# Patient Record
Sex: FEMALE | Race: BLACK OR AFRICAN AMERICAN | Employment: FULL TIME | URBAN - METROPOLITAN AREA
[De-identification: names, ages, dates, MRNs, and addresses within clinical notes are randomized per-mention and may not be internally consistent; named-entity substitution may affect disease eponyms.]

---

## 2018-08-24 ENCOUNTER — OFFICE VISIT (OUTPATIENT)
Dept: BARIATRICS/WEIGHT MGMT | Age: 38
End: 2018-08-24
Payer: MEDICARE

## 2018-08-24 VITALS
DIASTOLIC BLOOD PRESSURE: 68 MMHG | HEART RATE: 72 BPM | SYSTOLIC BLOOD PRESSURE: 108 MMHG | RESPIRATION RATE: 20 BRPM | HEIGHT: 62 IN | WEIGHT: 246 LBS | BODY MASS INDEX: 45.27 KG/M2

## 2018-08-24 DIAGNOSIS — K21.9 GASTROESOPHAGEAL REFLUX DISEASE WITHOUT ESOPHAGITIS: Primary | ICD-10-CM

## 2018-08-24 DIAGNOSIS — E66.01 MORBID OBESITY (HCC): ICD-10-CM

## 2018-08-24 DIAGNOSIS — R06.83 SNORING: ICD-10-CM

## 2018-08-24 PROCEDURE — 99204 OFFICE O/P NEW MOD 45 MIN: CPT | Performed by: SURGERY

## 2018-08-24 RX ORDER — TOPIRAMATE 100 MG/1
100 TABLET, FILM COATED ORAL 2 TIMES DAILY
COMMUNITY
End: 2020-02-16

## 2018-08-26 NOTE — PROGRESS NOTES
List:  Current Outpatient Prescriptions   Medication Sig Dispense Refill    topiramate (TOPAMAX) 100 MG tablet Take 100 mg by mouth 2 times daily       No current facility-administered medications for this visit. SOCIAL:      This patient is alone for the evaluation today. [] HIV Risk Factors (i.e.) intravenous drug abuser; at risk sexual behavior; received blood products    [] TB Risk Factors (i.e.) Medically underserved, institutional care, foreign born, endemic area; exposure to active case    [] Hepatitis B&C Risk Factors (i.e.) Received blood transfusion prior to 1992; recreational drug use; high risk sexual behaviors; tattoos or body piercings; contact with blood or needle sticks in the workplace    Comprehension    Ability to grasp concepts and respond to questions:   [x] High   [] Medium   [] Low    Motivation    [x] Asks Questions; eager to learn   [] Needs education   [] Extreme anxiety    [] uncooperative   [] Denies need for education    English Speaking Ability    [x] Speaks English well   [x] Reads English well   [x] Understands spoken Veleria Ronald    [] Understands written English   [] No need for interpretive support      [] Might benefit from interpretive support   []  required for all services     REVIEW OF SYSTEMS:     Do you feel sleepy during the day? [x] Yes     [] No  Do you get short of breath when walking up two flights of stairs? [x] Yes     [] No  Do you get chest pains when walking up two flights of stairs? [x] Yes     [] No  Do you suffer from back pain? [x] Yes     [] No  Do you suffer from knee pain? [x] Yes     [] No    Do you or have you had any of the following?   Cardiovascular YES NO Respiratory YES NO   High Blood Pressure   []   [x] COPD   []   [x]   Heart Attack   []   [x] TB/Positive skin Test   []   [x]   Congestive Heart Failure   []   [x] Obstructive Sleep Apnea   []   [x]   Coronary Artery Disease   []   [x] Asthma   []   [x] oriented. Moving all four extremities equally, sensation grossly intact bilateral.  Skin: Skin is warm, dry and intact. Psychiatric: The patient has a normal mood and affect. Speech is normal and behavior is normal. Judgment and thought content normal. Cognition and memory are normal.     RECOMMENDATIONS:     We spent a great deal of time discussing the risks and benefits of Jyoti-en-Y Gastric Bypass and Sleeve Gastrectomy, including but not limited to injury to intra-abdominal organs, breakdown of the gastric staple line, the need for re-operative therapy,  prolonged hospitalization,  mechanical ventilation,  and death. We discussed the possibility of bleeding, the need for blood transfusions, blood clots, hospital-acquired and intra-abdominal infection, anastomotic stricture, and worsening GERD. And we discussed the need for post-operative visit compliance, behavior modifications and diet changes, protein and vitamin supplementation, as well as routine scheduled and dedicated exercise. I instructed the patient to utilize the exercise log that will be given to them at their fist dietician appointment. We discussed the potential weight loss benefit of approximately 50-60/60-70% of her excess body weight at 12-18 months post-op, as well as the possibility of insufficient weight loss or weight gain after 2 years post-operative time. PLAN:       Diagnosis Orders   1. Gastroesophageal reflux disease without esophagitis  CBC Auto Differential    Comprehensive Metabolic Panel    Ferritin    Hemoglobin A1C    Iron and TIBC    Lipid Panel    Magnesium    PTH, Intact    Zinc    Vitamin D 25 Hydroxy    Vitamin B12 & Folate    Vitamin B1    Vitamin A    TSH without Reflex    T4, Free    Nicotine, Blood    Urine Drug Screen   2. Snoring  Apnea Link Screening (THIS IS NOT A HOME SLEEP STUDY) - [facility] Only   3.  Morbid obesity (HCC)  CBC Auto Differential    Comprehensive Metabolic Panel    Ferritin    Hemoglobin A1C Iron and TIBC    Lipid Panel    Magnesium    PTH, Intact    Zinc    Vitamin D 25 Hydroxy    Vitamin B12 & Folate    Vitamin B1    Vitamin A    TSH without Reflex    T4, Free    Nicotine, Blood    Urine Drug Screen    Apnea Link Screening (THIS IS NOT A HOME SLEEP STUDY) - [facility] Only          Initial Testing     Primary Procedure: Jyoti-en-Y Gastric Bypass and Sleeve Gastrectomy     Labwork: Initial Pre-surgical Lab Tests (CMP, TSH, Fasting Lipid Profile, Mg, Zinc, Vit B1 (whole blood), Vit B12, 25-OH Vit D, Fe,  Ferritin,  Folate), Urine drug and alcohol screen  and Negative serum nicotine prior to submission for pre-auth    Endoscopic Studies: Upper GI Endoscopy for GERD which has been untreated. Psychological Assessment: Psychological Evaluation and Clearance    Nutrition Assessment: Bariatric Nutrition Assessment and Clearance    Pulmonary Evaluation: Obstructive Sleep Apnea Evaluation    Other  Consultations: PCP clearance    Physician Supervised Diet and Exercise required by the patients insurance company: 3 months.       Surgical Diet requirement:  2 weeks    Final Testing  Screening Chest Xray  and EKG within 6 months of date of surgery    Labwork:  Final Lab Tests  within 3 months of date of surgery (CBC, PT/PTT, BMP)     Electronically signed by Liseth Lopez DO on 8/26/2018 at 5:08 PM

## 2018-08-30 LAB
ALBUMIN SERPL-MCNC: 4.2 G/DL
ALP BLD-CCNC: 69 U/L
ALT SERPL-CCNC: 26 U/L
ANION GAP SERPL CALCULATED.3IONS-SCNC: NORMAL MMOL/L
AST SERPL-CCNC: 21 U/L
AVERAGE GLUCOSE: 111
BASOPHILS ABSOLUTE: 0 /ΜL
BASOPHILS RELATIVE PERCENT: 0 %
BILIRUB SERPL-MCNC: 0.3 MG/DL (ref 0.1–1.4)
BUN BLDV-MCNC: 8 MG/DL
CALCIUM SERPL-MCNC: 9.3 MG/DL
CHLORIDE BLD-SCNC: 105 MMOL/L
CHOLESTEROL, TOTAL: 138 MG/DL
CHOLESTEROL/HDL RATIO: 3.7
CO2: 26 MMOL/L
CREAT SERPL-MCNC: 0.9 MG/DL
EOSINOPHILS ABSOLUTE: 0.16 /ΜL
EOSINOPHILS RELATIVE PERCENT: 4 %
FERRITIN: 8.2 NG/ML (ref 9–150)
FOLATE: 8.8
GFR CALCULATED: NORMAL
GLUCOSE BLD-MCNC: 106 MG/DL
HBA1C MFR BLD: 5.5 %
HCT VFR BLD CALC: 36.3 % (ref 36–46)
HDLC SERPL-MCNC: 37 MG/DL (ref 35–70)
HEMOGLOBIN: 11.3 G/DL (ref 12–16)
IRON: 40
LDL CHOLESTEROL CALCULATED: 83 MG/DL (ref 0–160)
LYMPHOCYTES ABSOLUTE: 1.92 /ΜL
LYMPHOCYTES RELATIVE PERCENT: 49 %
MAGNESIUM: 2.1 MG/DL
MCH RBC QN AUTO: 25.2 PG
MCHC RBC AUTO-ENTMCNC: 31.2 G/DL
MCV RBC AUTO: 80.6 FL
MONOCYTES ABSOLUTE: 0.2 /ΜL
MONOCYTES RELATIVE PERCENT: 5 %
NEUTROPHILS ABSOLUTE: 1.64 /ΜL
NEUTROPHILS RELATIVE PERCENT: ABNORMAL %
PDW BLD-RTO: ABNORMAL %
PLATELET # BLD: 313 K/ΜL
PMV BLD AUTO: ABNORMAL FL
POTASSIUM SERPL-SCNC: 4.3 MMOL/L
PTH INTACT: 143.3
RBC # BLD: 4.51 10^6/ΜL
SODIUM BLD-SCNC: 141 MMOL/L
T4 FREE: 1.36
TOTAL IRON BINDING CAPACITY: 407
TOTAL PROTEIN: 7.3
TRIGL SERPL-MCNC: 89 MG/DL
TSH SERPL DL<=0.05 MIU/L-ACNC: 1.52 UIU/ML
VITAMIN B-12: 413
VITAMIN D 25-HYDROXY: 12.1
VITAMIN D2, 25 HYDROXY: NORMAL
VITAMIN D3,25 HYDROXY: NORMAL
VLDLC SERPL CALC-MCNC: 18 MG/DL
WBC # BLD: 3.91 10^3/ML

## 2018-09-05 DIAGNOSIS — K21.9 GASTROESOPHAGEAL REFLUX DISEASE WITHOUT ESOPHAGITIS: ICD-10-CM

## 2018-09-05 DIAGNOSIS — E66.01 MORBID OBESITY (HCC): ICD-10-CM

## 2018-09-10 DIAGNOSIS — K21.9 GASTROESOPHAGEAL REFLUX DISEASE WITHOUT ESOPHAGITIS: ICD-10-CM

## 2018-09-10 DIAGNOSIS — E66.01 MORBID OBESITY (HCC): ICD-10-CM

## 2018-09-12 RX ORDER — ERGOCALCIFEROL 1.25 MG/1
50000 CAPSULE ORAL WEEKLY
Qty: 8 CAPSULE | Refills: 0 | Status: SHIPPED | OUTPATIENT
Start: 2018-09-12 | End: 2018-11-04 | Stop reason: SDUPTHER

## 2018-10-29 DIAGNOSIS — R06.83 SNORING: ICD-10-CM

## 2018-10-29 DIAGNOSIS — E66.01 MORBID OBESITY (HCC): ICD-10-CM

## 2018-11-02 ENCOUNTER — HOSPITAL ENCOUNTER (OUTPATIENT)
Age: 38
Setting detail: OUTPATIENT SURGERY
Discharge: HOME | End: 2018-11-02
Attending: SURGERY | Admitting: SURGERY
Payer: MEDICARE

## 2018-11-02 ENCOUNTER — ANESTHESIA EVENT (OUTPATIENT)
Dept: ENDOSCOPY | Age: 38
End: 2018-11-02
Payer: MEDICARE

## 2018-11-02 ENCOUNTER — ANESTHESIA (OUTPATIENT)
Dept: ENDOSCOPY | Age: 38
End: 2018-11-02
Payer: MEDICARE

## 2018-11-02 VITALS
TEMPERATURE: 97.7 F | BODY MASS INDEX: 47.2 KG/M2 | HEART RATE: 74 BPM | OXYGEN SATURATION: 99 % | DIASTOLIC BLOOD PRESSURE: 103 MMHG | HEIGHT: 61 IN | RESPIRATION RATE: 19 BRPM | WEIGHT: 250 LBS | SYSTOLIC BLOOD PRESSURE: 142 MMHG

## 2018-11-02 VITALS
OXYGEN SATURATION: 96 % | DIASTOLIC BLOOD PRESSURE: 80 MMHG | SYSTOLIC BLOOD PRESSURE: 115 MMHG | RESPIRATION RATE: 36 BRPM

## 2018-11-02 LAB — HCG, PREGNANCY URINE (POC): NEGATIVE

## 2018-11-02 PROCEDURE — 7100000011 HC PHASE II RECOVERY - ADDTL 15 MIN: Performed by: SURGERY

## 2018-11-02 PROCEDURE — 2709999900 HC NON-CHARGEABLE SUPPLY: Performed by: SURGERY

## 2018-11-02 PROCEDURE — 43239 EGD BIOPSY SINGLE/MULTIPLE: CPT | Performed by: SURGERY

## 2018-11-02 PROCEDURE — 2580000003 HC RX 258: Performed by: NURSE ANESTHETIST, CERTIFIED REGISTERED

## 2018-11-02 PROCEDURE — 2500000003 HC RX 250 WO HCPCS: Performed by: NURSE ANESTHETIST, CERTIFIED REGISTERED

## 2018-11-02 PROCEDURE — 84703 CHORIONIC GONADOTROPIN ASSAY: CPT

## 2018-11-02 PROCEDURE — 6360000002 HC RX W HCPCS: Performed by: NURSE ANESTHETIST, CERTIFIED REGISTERED

## 2018-11-02 PROCEDURE — 3609012400 HC EGD TRANSORAL BIOPSY SINGLE/MULTIPLE: Performed by: SURGERY

## 2018-11-02 PROCEDURE — 2580000003 HC RX 258: Performed by: SURGERY

## 2018-11-02 PROCEDURE — 88305 TISSUE EXAM BY PATHOLOGIST: CPT

## 2018-11-02 PROCEDURE — 3700000000 HC ANESTHESIA ATTENDED CARE: Performed by: SURGERY

## 2018-11-02 PROCEDURE — 7100000010 HC PHASE II RECOVERY - FIRST 15 MIN: Performed by: SURGERY

## 2018-11-02 RX ORDER — LIDOCAINE HYDROCHLORIDE 10 MG/ML
INJECTION, SOLUTION INFILTRATION; PERINEURAL PRN
Status: DISCONTINUED | OUTPATIENT
Start: 2018-11-02 | End: 2018-11-02 | Stop reason: SDUPTHER

## 2018-11-02 RX ORDER — RANITIDINE 150 MG/1
150 TABLET ORAL 2 TIMES DAILY
Status: ON HOLD | COMMUNITY
End: 2018-11-02 | Stop reason: HOSPADM

## 2018-11-02 RX ORDER — PANTOPRAZOLE SODIUM 40 MG/1
40 GRANULE, DELAYED RELEASE ORAL
Qty: 30 EACH | Refills: 3 | Status: SHIPPED | OUTPATIENT
Start: 2018-11-02 | End: 2020-02-16

## 2018-11-02 RX ORDER — SODIUM CHLORIDE 9 MG/ML
INJECTION, SOLUTION INTRAVENOUS CONTINUOUS PRN
Status: DISCONTINUED | OUTPATIENT
Start: 2018-11-02 | End: 2018-11-02 | Stop reason: SDUPTHER

## 2018-11-02 RX ORDER — PANTOPRAZOLE SODIUM 40 MG/1
40 GRANULE, DELAYED RELEASE ORAL
Status: ON HOLD | COMMUNITY
End: 2018-11-02

## 2018-11-02 RX ORDER — OXYCODONE HYDROCHLORIDE AND ACETAMINOPHEN 5; 325 MG/1; MG/1
1 TABLET ORAL EVERY 4 HOURS PRN
Status: ON HOLD | COMMUNITY
End: 2020-04-23 | Stop reason: HOSPADM

## 2018-11-02 RX ORDER — PANTOPRAZOLE SODIUM 40 MG/1
40 GRANULE, DELAYED RELEASE ORAL
Qty: 30 EACH | Refills: 3 | Status: SHIPPED | OUTPATIENT
Start: 2018-11-02 | End: 2018-11-02

## 2018-11-02 RX ORDER — DIAZEPAM 5 MG/1
5 TABLET ORAL EVERY 6 HOURS PRN
COMMUNITY
End: 2020-02-16

## 2018-11-02 RX ORDER — GLYCOPYRROLATE 1 MG/5 ML
SYRINGE (ML) INTRAVENOUS PRN
Status: DISCONTINUED | OUTPATIENT
Start: 2018-11-02 | End: 2018-11-02 | Stop reason: SDUPTHER

## 2018-11-02 RX ORDER — SODIUM CHLORIDE 9 MG/ML
INJECTION, SOLUTION INTRAVENOUS CONTINUOUS
Status: DISCONTINUED | OUTPATIENT
Start: 2018-11-02 | End: 2018-11-02 | Stop reason: HOSPADM

## 2018-11-02 RX ORDER — PROPOFOL 10 MG/ML
INJECTION, EMULSION INTRAVENOUS PRN
Status: DISCONTINUED | OUTPATIENT
Start: 2018-11-02 | End: 2018-11-02 | Stop reason: SDUPTHER

## 2018-11-02 RX ADMIN — PROPOFOL 50 MG: 10 INJECTION, EMULSION INTRAVENOUS at 08:25

## 2018-11-02 RX ADMIN — LIDOCAINE HYDROCHLORIDE 50 MG: 10 INJECTION, SOLUTION INFILTRATION; PERINEURAL at 08:23

## 2018-11-02 RX ADMIN — Medication 0.2 MG: at 08:23

## 2018-11-02 RX ADMIN — PROPOFOL 20 MG: 10 INJECTION, EMULSION INTRAVENOUS at 08:26

## 2018-11-02 RX ADMIN — PROPOFOL 50 MG: 10 INJECTION, EMULSION INTRAVENOUS at 08:32

## 2018-11-02 RX ADMIN — PROPOFOL 20 MG: 10 INJECTION, EMULSION INTRAVENOUS at 08:33

## 2018-11-02 RX ADMIN — SODIUM CHLORIDE: 9 INJECTION, SOLUTION INTRAVENOUS at 08:12

## 2018-11-02 RX ADMIN — PROPOFOL 60 MG: 10 INJECTION, EMULSION INTRAVENOUS at 08:27

## 2018-11-02 RX ADMIN — PROPOFOL 50 MG: 10 INJECTION, EMULSION INTRAVENOUS at 08:30

## 2018-11-02 RX ADMIN — PROPOFOL 50 MG: 10 INJECTION, EMULSION INTRAVENOUS at 08:29

## 2018-11-02 RX ADMIN — PROPOFOL 100 MG: 10 INJECTION, EMULSION INTRAVENOUS at 08:23

## 2018-11-02 RX ADMIN — SODIUM CHLORIDE: 9 INJECTION, SOLUTION INTRAVENOUS at 08:03

## 2018-11-02 RX ADMIN — PROPOFOL 50 MG: 10 INJECTION, EMULSION INTRAVENOUS at 08:31

## 2018-11-02 RX ADMIN — PROPOFOL 50 MG: 10 INJECTION, EMULSION INTRAVENOUS at 08:28

## 2018-11-02 ASSESSMENT — PAIN - FUNCTIONAL ASSESSMENT: PAIN_FUNCTIONAL_ASSESSMENT: 0-10

## 2018-11-02 ASSESSMENT — PAIN SCALES - GENERAL: PAINLEVEL_OUTOF10: 0

## 2018-11-02 NOTE — ANESTHESIA PRE PROCEDURE
Department of Anesthesiology  Preprocedure Note       Name:  Luis Bills   Age:  41 y.o.  :  1980                                          MRN:  [id number]         Date:  2018      Surgeon: Arpita Mesa):  Meliza Ulrich DO    Procedure: EGD ESOPHAGOGASTRODUODENOSCOPY (N/A )    Medications prior to admission:   Prior to Admission medications    Medication Sig Start Date End Date Taking? Authorizing Provider   vitamin D (ERGOCALCIFEROL) 73574 units CAPS capsule Take 1 capsule by mouth once a week for 8 doses 18  Meliza Ulrich DO   topiramate (TOPAMAX) 100 MG tablet Take 100 mg by mouth 2 times daily    Historical Provider, MD       Current medications:    No current facility-administered medications for this encounter. Allergies: Allergies   Allergen Reactions    Bactrim [Sulfamethoxazole-Trimethoprim]     Codeine     Ibuprofen     Sulfa Antibiotics     Tylenol [Acetaminophen]        Problem List:  There is no problem list on file for this patient. Past Medical History:        Diagnosis Date    Arthritis     Bursitis     Fibromyalgia     GERD (gastroesophageal reflux disease)     Migraine     Osteoarthritis     Renal failure     Stroke (cerebrum) (HCC)     Tendonitis        Past Surgical History:        Procedure Laterality Date    CYST REMOVAL       [facility]     TUBAL LIGATION          WISDOM TOOTH EXTRACTION         Social History:    Social History   Substance Use Topics    Smoking status: Current Every Day Smoker    Smokeless tobacco: Never Used    Alcohol use Yes                                Ready to quit: Not Answered  Counseling given: Not Answered      Vital Signs (Current): There were no vitals filed for this visit.                                            BP Readings from Last 3 Encounters:   18 108/68       NPO Status:                                                                                 BMI:   Wt

## 2018-11-05 LAB — SURGICAL PATHOLOGY REPORT: NORMAL

## 2018-11-05 RX ORDER — ERGOCALCIFEROL 1.25 MG/1
CAPSULE ORAL
Qty: 8 CAPSULE | Refills: 0 | Status: SHIPPED | OUTPATIENT
Start: 2018-11-05 | End: 2020-02-16

## 2018-11-08 ENCOUNTER — TELEPHONE (OUTPATIENT)
Dept: BARIATRICS/WEIGHT MGMT | Age: 38
End: 2018-11-08

## 2019-01-02 RX ORDER — ERGOCALCIFEROL 1.25 MG/1
CAPSULE ORAL
Qty: 8 CAPSULE | Refills: 0 | OUTPATIENT
Start: 2019-01-02

## 2019-02-06 NOTE — TELEPHONE ENCOUNTER
Multiple attempts to connect with patient without response will cancel orders and notify referring provider.

## 2019-05-07 ENCOUNTER — TELEPHONE (OUTPATIENT)
Dept: BARIATRICS/WEIGHT MGMT | Age: 39
End: 2019-05-07

## 2019-05-07 NOTE — TELEPHONE ENCOUNTER
Spoke with patient. Patient would like to restart program last seen 8/24/18. Patient states she has new insurance and needs to be verified. [facility]  Member ID: [id number]  Group #: [id number]    Please advice.

## 2019-06-06 ENCOUNTER — HOSPITAL ENCOUNTER (OUTPATIENT)
Age: 39
Discharge: HOME | End: 2019-06-06

## 2019-06-06 LAB — RUBV IGG SER QL: >500 IU/ML

## 2019-06-06 PROCEDURE — 86787 VARICELLA-ZOSTER ANTIBODY: CPT

## 2019-06-06 PROCEDURE — 86762 RUBELLA ANTIBODY: CPT

## 2019-06-06 PROCEDURE — 86481 TB AG RESPONSE T-CELL SUSP: CPT

## 2019-06-06 PROCEDURE — 86735 MUMPS ANTIBODY: CPT

## 2019-06-06 PROCEDURE — 36415 COLL VENOUS BLD VENIPUNCTURE: CPT

## 2019-06-06 PROCEDURE — 86765 RUBEOLA ANTIBODY: CPT

## 2019-06-07 LAB
MEASLES IMMUNE (IGG): 3.57
MUV IGG SER QL: 3.59
VZV IGG SER QL IA: 4.45

## 2019-06-09 LAB — T-SPOT TB TEST: NORMAL

## 2020-02-16 ENCOUNTER — APPOINTMENT (OUTPATIENT)
Dept: CT IMAGING | Age: 40
End: 2020-02-16
Payer: COMMERCIAL

## 2020-02-16 ENCOUNTER — HOSPITAL ENCOUNTER (EMERGENCY)
Age: 40
Discharge: HOME | End: 2020-02-16
Attending: EMERGENCY MEDICINE
Payer: COMMERCIAL

## 2020-02-16 VITALS
RESPIRATION RATE: 19 BRPM | OXYGEN SATURATION: 99 % | TEMPERATURE: 98.2 F | HEIGHT: 61 IN | DIASTOLIC BLOOD PRESSURE: 73 MMHG | BODY MASS INDEX: 47.2 KG/M2 | SYSTOLIC BLOOD PRESSURE: 117 MMHG | HEART RATE: 89 BPM | WEIGHT: 250 LBS

## 2020-02-16 LAB
ABSOLUTE EOS #: 0.23 K/UL (ref 0–0.4)
ABSOLUTE IMMATURE GRANULOCYTE: ABNORMAL K/UL (ref 0–0.3)
ABSOLUTE LYMPH #: 2.25 K/UL (ref 1–4.8)
ABSOLUTE MONO #: 0.6 K/UL (ref 0.1–1.3)
ALBUMIN SERPL-MCNC: 3.9 G/DL (ref 3.5–5.2)
ALBUMIN/GLOBULIN RATIO: ABNORMAL (ref 1–2.5)
ALP BLD-CCNC: 69 U/L (ref 35–104)
ALT SERPL-CCNC: 9 U/L (ref 5–33)
ANION GAP SERPL CALCULATED.3IONS-SCNC: 10 MMOL/L (ref 9–17)
AST SERPL-CCNC: 12 U/L
BASOPHILS # BLD: 1 % (ref 0–2)
BASOPHILS ABSOLUTE: 0.08 K/UL (ref 0–0.2)
BILIRUB SERPL-MCNC: <0.15 MG/DL (ref 0.3–1.2)
BILIRUBIN URINE: NEGATIVE
BUN BLDV-MCNC: 16 MG/DL (ref 6–20)
BUN/CREAT BLD: ABNORMAL (ref 9–20)
CALCIUM SERPL-MCNC: 9.2 MG/DL (ref 8.6–10.4)
CHLORIDE BLD-SCNC: 101 MMOL/L (ref 98–107)
CO2: 26 MMOL/L (ref 20–31)
COLOR: YELLOW
COMMENT UA: NORMAL
CREAT SERPL-MCNC: 1.13 MG/DL (ref 0.5–0.9)
DIFFERENTIAL TYPE: ABNORMAL
EOSINOPHILS RELATIVE PERCENT: 3 % (ref 0–4)
GFR AFRICAN AMERICAN: >60 ML/MIN
GFR NON-AFRICAN AMERICAN: 54 ML/MIN
GFR SERPL CREATININE-BSD FRML MDRD: ABNORMAL ML/MIN/{1.73_M2}
GFR SERPL CREATININE-BSD FRML MDRD: ABNORMAL ML/MIN/{1.73_M2}
GLUCOSE BLD-MCNC: 120 MG/DL (ref 70–99)
GLUCOSE URINE: NEGATIVE
HCG QUALITATIVE: NEGATIVE
HCT VFR BLD CALC: 33.7 % (ref 36–46)
HEMOGLOBIN: 10.8 G/DL (ref 12–16)
IMMATURE GRANULOCYTES: ABNORMAL %
KETONES, URINE: NEGATIVE
LEUKOCYTE ESTERASE, URINE: NEGATIVE
LIPASE: 26 U/L (ref 13–60)
LYMPHOCYTES # BLD: 30 % (ref 24–44)
MCH RBC QN AUTO: 25 PG (ref 26–34)
MCHC RBC AUTO-ENTMCNC: 32.2 G/DL (ref 31–37)
MCV RBC AUTO: 77.6 FL (ref 80–100)
MONOCYTES # BLD: 8 % (ref 1–7)
MORPHOLOGY: ABNORMAL
MORPHOLOGY: ABNORMAL
NITRITE, URINE: NEGATIVE
NRBC AUTOMATED: ABNORMAL PER 100 WBC
PDW BLD-RTO: 17.8 % (ref 11.5–14.9)
PH UA: 6 (ref 5–8)
PLATELET # BLD: 376 K/UL (ref 150–450)
PLATELET ESTIMATE: ABNORMAL
PMV BLD AUTO: 8.2 FL (ref 6–12)
POTASSIUM SERPL-SCNC: 4.2 MMOL/L (ref 3.7–5.3)
PROTEIN UA: NEGATIVE
RBC # BLD: 4.34 M/UL (ref 4–5.2)
RBC # BLD: ABNORMAL 10*6/UL
SEG NEUTROPHILS: 58 % (ref 36–66)
SEGMENTED NEUTROPHILS ABSOLUTE COUNT: 4.34 K/UL (ref 1.3–9.1)
SODIUM BLD-SCNC: 137 MMOL/L (ref 135–144)
SPECIFIC GRAVITY UA: 1.03 (ref 1–1.03)
TOTAL PROTEIN: 7.3 G/DL (ref 6.4–8.3)
TURBIDITY: CLEAR
URINE HGB: NEGATIVE
UROBILINOGEN, URINE: NORMAL
WBC # BLD: 7.5 K/UL (ref 3.5–11)
WBC # BLD: ABNORMAL 10*3/UL

## 2020-02-16 PROCEDURE — 36415 COLL VENOUS BLD VENIPUNCTURE: CPT

## 2020-02-16 PROCEDURE — 80053 COMPREHEN METABOLIC PANEL: CPT

## 2020-02-16 PROCEDURE — 74177 CT ABD & PELVIS W/CONTRAST: CPT

## 2020-02-16 PROCEDURE — 99284 EMERGENCY DEPT VISIT MOD MDM: CPT

## 2020-02-16 PROCEDURE — 83690 ASSAY OF LIPASE: CPT

## 2020-02-16 PROCEDURE — 96375 TX/PRO/DX INJ NEW DRUG ADDON: CPT

## 2020-02-16 PROCEDURE — 81003 URINALYSIS AUTO W/O SCOPE: CPT

## 2020-02-16 PROCEDURE — 96374 THER/PROPH/DIAG INJ IV PUSH: CPT

## 2020-02-16 PROCEDURE — 6360000002 HC RX W HCPCS: Performed by: EMERGENCY MEDICINE

## 2020-02-16 PROCEDURE — 85025 COMPLETE CBC W/AUTO DIFF WBC: CPT

## 2020-02-16 PROCEDURE — 84703 CHORIONIC GONADOTROPIN ASSAY: CPT

## 2020-02-16 PROCEDURE — 2580000003 HC RX 258: Performed by: EMERGENCY MEDICINE

## 2020-02-16 PROCEDURE — 6370000000 HC RX 637 (ALT 250 FOR IP): Performed by: EMERGENCY MEDICINE

## 2020-02-16 PROCEDURE — 6360000004 HC RX CONTRAST MEDICATION: Performed by: EMERGENCY MEDICINE

## 2020-02-16 RX ORDER — ONDANSETRON 2 MG/ML
4 INJECTION INTRAMUSCULAR; INTRAVENOUS ONCE
Status: COMPLETED | OUTPATIENT
Start: 2020-02-16 | End: 2020-02-16

## 2020-02-16 RX ORDER — MORPHINE SULFATE 4 MG/ML
4 INJECTION, SOLUTION INTRAMUSCULAR; INTRAVENOUS ONCE
Status: COMPLETED | OUTPATIENT
Start: 2020-02-16 | End: 2020-02-16

## 2020-02-16 RX ORDER — MAGNESIUM HYDROXIDE/ALUMINUM HYDROXICE/SIMETHICONE 120; 1200; 1200 MG/30ML; MG/30ML; MG/30ML
15 SUSPENSION ORAL ONCE
Status: COMPLETED | OUTPATIENT
Start: 2020-02-16 | End: 2020-02-16

## 2020-02-16 RX ORDER — 0.9 % SODIUM CHLORIDE 0.9 %
1000 INTRAVENOUS SOLUTION INTRAVENOUS ONCE
Status: COMPLETED | OUTPATIENT
Start: 2020-02-16 | End: 2020-02-16

## 2020-02-16 RX ORDER — LIDOCAINE HYDROCHLORIDE 20 MG/ML
15 SOLUTION OROPHARYNGEAL ONCE
Status: COMPLETED | OUTPATIENT
Start: 2020-02-16 | End: 2020-02-16

## 2020-02-16 RX ORDER — SODIUM CHLORIDE 0.9 % (FLUSH) 0.9 %
10 SYRINGE (ML) INJECTION PRN
Status: DISCONTINUED | OUTPATIENT
Start: 2020-02-16 | End: 2020-02-16 | Stop reason: HOSPADM

## 2020-02-16 RX ORDER — 0.9 % SODIUM CHLORIDE 0.9 %
80 INTRAVENOUS SOLUTION INTRAVENOUS ONCE
Status: COMPLETED | OUTPATIENT
Start: 2020-02-16 | End: 2020-02-16

## 2020-02-16 RX ADMIN — MORPHINE SULFATE 4 MG: 4 INJECTION, SOLUTION INTRAMUSCULAR; INTRAVENOUS at 02:15

## 2020-02-16 RX ADMIN — LIDOCAINE HYDROCHLORIDE 15 ML: 20 SOLUTION ORAL; TOPICAL at 04:19

## 2020-02-16 RX ADMIN — SODIUM CHLORIDE 1000 ML: 9 INJECTION, SOLUTION INTRAVENOUS at 02:15

## 2020-02-16 RX ADMIN — Medication 10 ML: at 03:04

## 2020-02-16 RX ADMIN — ONDANSETRON 4 MG: 2 INJECTION INTRAMUSCULAR; INTRAVENOUS at 02:15

## 2020-02-16 RX ADMIN — SODIUM CHLORIDE 80 ML: 9 INJECTION, SOLUTION INTRAVENOUS at 03:04

## 2020-02-16 RX ADMIN — IOPAMIDOL 75 ML: 755 INJECTION, SOLUTION INTRAVENOUS at 03:05

## 2020-02-16 RX ADMIN — ALUMINUM HYDROXIDE, MAGNESIUM HYDROXIDE, AND SIMETHICONE 15 ML: 200; 200; 20 SUSPENSION ORAL at 04:19

## 2020-02-16 ASSESSMENT — PAIN DESCRIPTION - PAIN TYPE: TYPE: ACUTE PAIN

## 2020-02-16 ASSESSMENT — PAIN DESCRIPTION - FREQUENCY: FREQUENCY: INTERMITTENT

## 2020-02-16 ASSESSMENT — PAIN DESCRIPTION - ORIENTATION: ORIENTATION: RIGHT;UPPER

## 2020-02-16 ASSESSMENT — PAIN DESCRIPTION - DESCRIPTORS: DESCRIPTORS: SHARP

## 2020-02-16 ASSESSMENT — PAIN DESCRIPTION - LOCATION: LOCATION: ABDOMEN;FLANK;BACK

## 2020-02-16 ASSESSMENT — PAIN SCALES - GENERAL: PAINLEVEL_OUTOF10: 10

## 2020-02-16 NOTE — ED NOTES
.Report given to Rosalee Hanna RN from ED. Report method in person   The following was reviewed with receiving RN:   Current vital signs:  /73   Pulse 89   Temp 98.2 °F (36.8 °C) (Oral)   Resp 19   Ht 5' 1\" (1.549 m)   Wt 250 lb (113.4 kg)   LMP 02/07/2020   SpO2 99%   BMI 47.24 kg/m²                MEWS Score: 1     Any medication or safety alerts were reviewed. Any pending diagnostics and notifications were also reviewed, as well as any safety concerns or issues, abnormal labs, abnormal imaging, and abnormal assessment findings. Questions were answered.             Aldo Borja RN  02/16/20 5277

## 2020-02-16 NOTE — ED NOTES
Mode of arrival (squad #, walk in, police, etc) : walk in from home        Chief complaint(s): abdominal pain, flank pain, nausea        Arrival Note (brief scenario, treatment PTA, etc). : Pt presents with right flank pain radiating to right lower back and right upper abdomen. Pt states she has also been intermittently nauseous. Pt denies emesis. Pt states she has had the pain intermittently for months. Pt hx of hiatal hernia and ovarian tumor. Pt denies SOB, CP. Pt denies urinary frequency, urgency, and dysuria. Pt is A&Ox4, eupneic, PWD. GCS=15. Call light in reach. C= \"Have you ever felt that you should Cut down on your drinking? \"  No  A= \"Have people Annoyed you by criticizing your drinking? \"  No  G= \"Have you ever felt bad or Guilty about your drinking? \"  No  E= \"Have you ever had a drink as an Eye-opener first thing in the morning to steady your nerves or to help a hangover? \"  No      Deferred []      Reason for deferring: N/A    *If yes to two or more: probable alcohol abuse. Victorina Bryan RN  02/16/20 0222

## 2020-02-19 ASSESSMENT — ENCOUNTER SYMPTOMS
ABDOMINAL PAIN: 1
DIARRHEA: 0
COUGH: 0
SHORTNESS OF BREATH: 0
RHINORRHEA: 0
CONSTIPATION: 0
VOMITING: 0
NAUSEA: 1
EYE PAIN: 0
BACK PAIN: 0
CHEST TIGHTNESS: 0

## 2020-02-19 NOTE — ED PROVIDER NOTES
[facility] ED  Emergency Department Encounter  Emergency Medicine Attending Note     Pt Name: Eris Murcia  MRN: [id number]  [place] 1980   Date of evaluation: 2/16/2020  PCP:  No primary care provider on file. CHIEF COMPLAINT       Chief Complaint   Patient presents with    Abdominal Pain     upper, right    Flank Pain     right    Nausea       HISTORY OF PRESENT ILLNESS  (Location/Symptom, Timing/Onset, Context/Setting, Quality, Duration, Modifying Factors, Severity.)      Eris Murcia is a 42 y.o. female who presents with right upper abdominal pain right flank pain, nausea without vomiting. Patient states that she has had intermittent pain here for several months, but is worsened over the last 2 to 3 days the most severe at this time. States that it feels like someone stabbing her in the abdomen. She also feels like when she eats and walks around, she feels a sloshing noise inside her upper abdomen. Patient believes it is likely her gallbladder. Patient also mentioned that she had a recent MRI of the spine, where they found a tumor on her uterus. She states that they just found is and she is not certain whether going to do about this. She denies any urinary symptoms. Denies any current fevers or chills. PAST MEDICAL / SURGICAL / SOCIAL / FAMILY HISTORY     Past Medical History:  has a past medical history of Arthritis, Bursitis, Fibromyalgia, GERD (gastroesophageal reflux disease), Migraine, Osteoarthritis, Renal failure, Stroke (cerebrum) ([address] and Tendonitis. Past Surgical History:  has a past surgical history that includes cyst removal; Tubal ligation; [place] tooth extraction; and Upper gastrointestinal endoscopy (11/2/2018). Allergies:  Vicodin [hydrocodone-acetaminophen]; Bactrim [sulfamethoxazole-trimethoprim]; Codeine;  Ibuprofen; Sulfa antibiotics; and Tylenol [acetaminophen]     Home Meds:   Prior to Visit Medications    Medication Sig Taking? Authorizing Provider   oxyCODONE-acetaminophen (PERCOCET) 5-325 MG per tablet Take 1 tablet by mouth every 4 hours as needed for Pain. . Yes Historical Provider, MD     Please note that medications prescribed at discharge will auto-populate into this medication list when note is refreshed. Please look at prescription date andprescriber to clarify. Family History:family history includes Arthritis in her father and mother; Mental Illness in her mother. Social History: She reports that she has quit smoking. She has never used smokeless tobacco. She reports current alcohol use. She reports that she does not use drugs. She has no history on file for sexual activity. REVIEW OF SYSTEMS    (2-9 systems for level 4, 10 or more for level 5)      Review of Systems   Constitutional: Negative for chills and fever. HENT: Negative for congestion and rhinorrhea. Eyes: Negative for pain and visual disturbance. Respiratory: Negative for cough, chest tightness and shortness of breath. Cardiovascular: Negative for chest pain and palpitations. Gastrointestinal: Positive for abdominal pain and nausea. Negative for constipation, diarrhea and vomiting. Genitourinary: Negative for dysuria and frequency. Musculoskeletal: Negative for arthralgias, back pain, joint swelling and myalgias. Skin: Negative for rash and wound. Neurological: Negative for dizziness, light-headedness and headaches. PHYSICAL EXAM   (up to 7 for level 4, 8 or more for level 5)      Initial Vitals   ED Triage Vitals [02/16/20 0138]   BP Temp Temp Source Pulse Resp SpO2 Height Weight   117/73 98.2 °F (36.8 °C) Oral 89 19 99 % 5' 1\" (1.549 m) 250 lb (113.4 kg)       Physical Exam  Vitals signs and nursing note reviewed. Constitutional:       General: She is not in acute distress. Appearance: She is well-developed. She is not diaphoretic. HENT:      Head: Normocephalic and atraumatic.       Mouth/Throat:      Mouth: Mucous membranes are moist.   Eyes:      General: No scleral icterus. Extraocular Movements: Extraocular movements intact. Conjunctiva/sclera: Conjunctivae normal.   Neck:      Musculoskeletal: Normal range of motion and neck supple. Cardiovascular:      Rate and Rhythm: Normal rate and regular rhythm. Heart sounds: Normal heart sounds. No murmur. No friction rub. No gallop. Pulmonary:      Effort: Pulmonary effort is normal. No respiratory distress. Breath sounds: Normal breath sounds. No wheezing or rales. Abdominal:      General: There is no distension. Palpations: Abdomen is soft. Tenderness: There is abdominal tenderness in the right upper quadrant and epigastric area. There is no guarding or rebound. Negative signs include Lau's sign and McBurney's sign. Musculoskeletal: Normal range of motion. General: No deformity. Skin:     General: Skin is warm. Coloration: Skin is not pale. Findings: No erythema or rash. Neurological:      Mental Status: She is alert and oriented to person, place, and time. Coordination: Coordination normal.   Psychiatric:         Behavior: Behavior normal.         Thought Content: Thought content normal.         Judgment: Judgment normal.         DIFFERENTIAL DIAGNOSIS/IMPRESSION     DDX: Cholelithiasis cholecystitis, transaminitis, hepatitis, pancreatitis, gastritis, malignancy, metastases    Impression: 42 y.o. female who presents with upper abdominal pain and nausea. Believes it might be her gallbladder. However also had an MRI of her spine that incidentally identified a tumor of the uterus. On exam, she appears mildly uncle. She does have tenderness in the right upper quadrant and epigastric region. No Lau sign. No CVA tenderness. No other acute findings. There is concern for possible gallbladder pathology.   However given the fact that she was just diagnosed with this tumor of the uterus without a full imaging of Reason for Exam: Pt c/o chronic abdominal pain x 2 years, recent diagnosis of uterine tumor on MRI Acuity: Chronic Type of Exam: Unknown FINDINGS: Lower Chest: The lung bases are well aerated. Pleural surfaces are unremarkable and no evidence of pleural effusion is identified. Organs: Scattered density is seen within kidneys bilaterally at the corticomedullary junction. Inferior left renal rounded simple cyst is present which measures up to 10 mm in diameter. The liver, gallbladder, spleen, pancreas, adrenal glands, kidneys, are otherwise unremarkable in appearance. GI/Bowel: Appendix is visualized and is normal.  The stomach is unremarkable without wall thickening or distention. Bowel loops are unremarkable in appearance without evidence of obstruction, distension or mucosal thickening. Pelvis: Anterior right uterine fundal generally hypodense mildly heterogeneous rounded mass lesion is noted measuring up to 60 mm in diameter. Urinary bladder is mildly distended and grossly unremarkable. No pelvic free fluid is seen. No pelvic or inguinal lymphadenopathy is identified. Bilateral Essure coils are noted in place within the fallopian tubes. Peritoneum/Retroperitoneum: No evidence of retroperitoneal or intraperitoneal lymphadenopathy is identified. No evidence of intraperitoneal free fluid is seen. Bones/Soft Tissues: The bones, skeletal muscle bundles, fascial planes and subcutaneous soft tissues are unremarkable in appearance. 1. Anterior right uterine hypodense mildly heterogeneous rounded mass lesion, as discussed above. Correlate with findings on reported recent MRI examination. 2. Symmetric increased density within the kidney parenchyma at the corticomedullary junction. 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 Recommend clinical correlation. 3. Otherwise, unremarkable contrast enhanced CT abdomen and pelvis examination.        BEDSIDE ULTRASOUND:  RIGHT UPPER QUADRANT ULTRASOUND:   A limited, bedside ultrasound of the right upper quadrant was performed. The medical necessity was to evaluate for gallstones or sonographic signs of cholecystitis. The structures studied were the gallbladder and liver. FINDINGS:  Stones:  No  Sludge:  Yes  Pericholecystic Fluid:  No  Wall thickness:  Normal  CBD:  Normal      IMAGES:  Electronically uploaded to the PACS system      ED COURSE      ED Medication Orders (From admission, onward)    Start Ordered     Status Ordering Provider    02/16/20 0430 02/16/20 0416  aluminum & magnesium hydroxide-simethicone (MAALOX) 200-200-20 MG/5ML suspension 15 mL  ONCE      Last MAR action:  Given - by Maci Mesa on 02/16/20 at 0419 [facility] E    02/16/20 0430 02/16/20 0416  lidocaine viscous hcl (XYLOCAINE) 2 % solution 15 mL  ONCE      Last MAR action:  Given - by Maci Mesa on 02/16/20 [facility]    02/16/20 0245 02/16/20 0242  0.9 % sodium chloride bolus  ONCE      Last MAR action:  Stopped - by Adine Bosworth on 02/16/20 at [facility]    02/16/20 0241 02/16/20 0242  iopamidol (ISOVUE-370) 76 % injection 75 mL  IMG ONCE PRN      Last MAR action:  Given - by Adine Bosworth on 02/16/20 at 0305 [facility] NIR E    02/16/20 0215 02/16/20 0206  0.9 % sodium chloride bolus  ONCE      Last MAR action:  Stopped - by Maci Mesa on 02/16/20 at [facility] E    02/16/20 0215 02/16/20 0206  ondansetron (ZOFRAN) injection 4 mg  ONCE      Last MAR action:  Given - by Simone Khan on 02/16/20 at [address] [facility] [address] [facility] [address] [facility] E    02/16/20 0215 02/16/20 0206  morphine sulfate (PF) injection 4 mg  ONCE      Last MAR action:  Given - by Simone Khan on 02/16/20 at [facility]          EMERGENCYDEPARTMENT COURSE:    CT scan shows the uterine mass, like fibroid but uncertain from imaging. No signs of metastases. That ultrasound showed some gallbladder sludge, but no cholelithiasis or signs of cholecystitis.     Still having some abdominal pain.  Has chronic pain management opioid Rx at home per [facility]. Patient is comfortbale with no other acute meds at this time. Will give GI cocktail. Given info for surgery, OB, and primary care follow-up. D/C at this time. PROCEDURES:  None     CONSULTS:  None    CRITICAL CARE:  None      FINAL IMPRESSION      1. Biliary colic    2. Uterine mass          DISPOSITION / PLAN     DISPOSITION Decision To Discharge 02/16/2020 04:07:32 AM      PATIENT REFERRED TO:  Your Primary Care Provider  If you do not have one, please see clinic list below. Schedule an appointment as soon as possible for a visit in 1 week      [address] ED  [address]  [address] [facility] [address]  [phone]  Go to   As needed, If symptoms worsen    Arnold Hart, 1 [facility] [address] [id number] [address]  [phone]    Schedule an appointment as soon as possible for a visit   For follow-up of ovarian mass    Ulysses Barker MD  [address] [facility]3].  [address] [place] [address]  [address]    Schedule an appointment as soon as possible for a visit   For follow-up of recurrent biliary colic and hiatal hernia.       DISCHARGE MEDICATIONS:  Discharge Medication List as of 2/16/2020  4:20 AM          Iban Welch MD  Emergency Medicine Attending      (Please note that portions of this note were completed with a voice recognition program.  Efforts were made to edit the dictations but occasionallywords are mis-transcribed.)          Iban Welch MD  02/19/20 8075

## 2020-03-17 ENCOUNTER — HOSPITAL ENCOUNTER (OUTPATIENT)
Dept: NUCLEAR MEDICINE | Age: 40
Discharge: HOME | End: 2020-03-19
Payer: COMMERCIAL

## 2020-03-17 ENCOUNTER — HOSPITAL ENCOUNTER (OUTPATIENT)
Dept: ULTRASOUND IMAGING | Age: 40
Discharge: HOME | End: 2020-03-19
Payer: COMMERCIAL

## 2020-03-17 PROCEDURE — 2580000003 HC RX 258: Performed by: SURGERY

## 2020-03-17 PROCEDURE — 76705 ECHO EXAM OF ABDOMEN: CPT

## 2020-03-17 PROCEDURE — A9537 TC99M MEBROFENIN: HCPCS | Performed by: SURGERY

## 2020-03-17 PROCEDURE — 78226 HEPATOBILIARY SYSTEM IMAGING: CPT

## 2020-03-17 PROCEDURE — 3430000000 HC RX DIAGNOSTIC RADIOPHARMACEUTICAL: Performed by: SURGERY

## 2020-03-17 RX ORDER — SODIUM CHLORIDE 0.9 % (FLUSH) 0.9 %
10 SYRINGE (ML) INJECTION PRN
Status: DISCONTINUED | OUTPATIENT
Start: 2020-03-17 | End: 2020-03-20 | Stop reason: HOSPADM

## 2020-03-17 RX ADMIN — Medication 10 ML: at 10:43

## 2020-03-17 RX ADMIN — Medication 4.8 MILLICURIE: at 10:44

## 2020-03-17 RX ADMIN — Medication 10 ML: at 10:44

## 2020-03-26 ENCOUNTER — TELEPHONE (OUTPATIENT)
Dept: OBGYN | Age: 40
End: 2020-03-26

## 2020-04-06 ENCOUNTER — HOSPITAL ENCOUNTER (OUTPATIENT)
Dept: PREADMISSION TESTING | Age: 40
Discharge: HOME | End: 2020-04-10
Payer: COMMERCIAL

## 2020-04-06 VITALS
WEIGHT: 250 LBS | RESPIRATION RATE: 20 BRPM | DIASTOLIC BLOOD PRESSURE: 86 MMHG | OXYGEN SATURATION: 99 % | HEIGHT: 62 IN | TEMPERATURE: 98.1 F | BODY MASS INDEX: 46.01 KG/M2 | HEART RATE: 94 BPM | SYSTOLIC BLOOD PRESSURE: 142 MMHG

## 2020-04-06 LAB
ABSOLUTE EOS #: 0.2 K/UL (ref 0–0.4)
ABSOLUTE IMMATURE GRANULOCYTE: ABNORMAL K/UL (ref 0–0.3)
ABSOLUTE LYMPH #: 1.5 K/UL (ref 1–4.8)
ABSOLUTE MONO #: 0.3 K/UL (ref 0.1–1.3)
BASOPHILS # BLD: 0 % (ref 0–2)
BASOPHILS ABSOLUTE: 0 K/UL (ref 0–0.2)
DIFFERENTIAL TYPE: ABNORMAL
EOSINOPHILS RELATIVE PERCENT: 4 % (ref 0–4)
HCT VFR BLD CALC: 33 % (ref 36–46)
HEMOGLOBIN: 11.3 G/DL (ref 12–16)
IMMATURE GRANULOCYTES: ABNORMAL %
LYMPHOCYTES # BLD: 30 % (ref 24–44)
MCH RBC QN AUTO: 26.9 PG (ref 26–34)
MCHC RBC AUTO-ENTMCNC: 34.3 G/DL (ref 31–37)
MCV RBC AUTO: 78.7 FL (ref 80–100)
MONOCYTES # BLD: 6 % (ref 1–7)
NRBC AUTOMATED: ABNORMAL PER 100 WBC
PDW BLD-RTO: 17.3 % (ref 11.5–14.9)
PLATELET # BLD: 333 K/UL (ref 150–450)
PLATELET ESTIMATE: ABNORMAL
PMV BLD AUTO: 7.7 FL (ref 6–12)
RBC # BLD: 4.19 M/UL (ref 4–5.2)
RBC # BLD: ABNORMAL 10*6/UL
SEG NEUTROPHILS: 60 % (ref 36–66)
SEGMENTED NEUTROPHILS ABSOLUTE COUNT: 3 K/UL (ref 1.3–9.1)
WBC # BLD: 5 K/UL (ref 3.5–11)
WBC # BLD: ABNORMAL 10*3/UL

## 2020-04-06 PROCEDURE — 85025 COMPLETE CBC W/AUTO DIFF WBC: CPT

## 2020-04-06 PROCEDURE — 36415 COLL VENOUS BLD VENIPUNCTURE: CPT

## 2020-04-06 NOTE — H&P
[Hydrocodone-Acetaminophen] Hives    Bactrim [Sulfamethoxazole-Trimethoprim]     Codeine     Ibuprofen     Sulfa Antibiotics     Tylenol [Acetaminophen]        Current Outpatient Medications on File Prior to Encounter   Medication Sig Dispense Refill    oxyCODONE-acetaminophen (PERCOCET) 5-325 MG per tablet Take 1 tablet by mouth every 4 hours as needed for Pain. .       No current facility-administered medications on file prior to encounter. General health:  Fairly good. No fever or chills. Skin:  No itching, redness or rash. HEENT:  No headache, epistaxis or sore throat. Neck:  No pain, stiffness or masses. Cardiovascular/Respiratory system:  No chest pain, palpitation or shortness of breath. Gastrointestinal tract: See HPI. Genitourinary:  No burning on micturition. No hesitancy, urgency, frequency or discoloration of urine. Locomotor:  No bone or joint pains. No swelling. Neuropsychiatric:  No referable complaints. GENERAL PHYSICAL EXAM:     Vitals: BP (!) 142/86   Pulse 94   Temp 98.1 °F (36.7 °C) (Oral)   Resp 20   Ht 5' 1.5\" (1.562 m)   Wt 250 lb (113.4 kg)   LMP 04/02/2020   SpO2 99%   BMI 46.47 kg/m²  Body mass index is 46.47 kg/m². GENERAL APPEARANCE:   Gabriela Guardado is 42 y.o.,  female, severely obese, nourished, conscious, alert. Does not appear to be distress or pain at this time. SKIN:  Warm, dry, no cyanosis or jaundice. HEAD:  Normocephalic, atraumatic, no swelling or tenderness. EYES:  Pupils equal, reactive to light. EARS:  No discharge, no marked hearing loss. NOSE:  No rhinorrhea, epistaxis or septal deformity. THROAT:  Not congested. No ulceration bleeding or discharge.                   NECK:  No stiffness, trachea central.                  CHEST:  Symmetrical and equal on expansion. HEART:  RRR S1 > S2. No audible murmurs or gallops. LUNGS:  Equal on expansion, normal breath sounds. No adventitious sounds. ABDOMEN:  Obese. Soft on palpation. No localized tenderness. No guarding or rigidity. No palpable hepatosplenomegaly. LYMPHATICS:  No palpable cervical lymphadenopathy. LOCOMOTOR, BACK AND SPINE:  No tenderness or deformities. EXTREMITIES:  Symmetrical, no pretibial edema. Ashlys sign negative or no calf tenderness. No discoloration or ulcerations. NEUROLOGIC:  The patient is conscious, alert, oriented,Cranial nerve II-XII intact, taste and smell were not examined. No apparent focal sensory or motor deficits.                                                                                      PROVISIONAL DIAGNOSES / SURGERY:      RUQ PAIN    CHOLELITHIASIS    CHOLECYSTECTOMY LAPAROSCOPIC POSSIBLE OPEN POSSIBLE GRAM      Patient Active Problem List    Diagnosis Date Noted    Hiatal hernia with GERD without esophagitis            JUAN RAMON HERCULES, APRN - CNP on 4/6/2020 at 1:00 PM

## 2020-04-06 NOTE — H&P (VIEW-ONLY)
HISTORY and Robbi Steel [id number]       NAME:  Julian Gregorio  MRN: [id number]   YOB: 1980   Date: 4/6/2020   Age: 42 y.o. Gender: female       COMPLAINT AND PRESENT HISTORY:     Julian Gregorio is 42 y.o.,   female, undergoing  for [place].   Pt has Cholelithiasis. Gall bladder ultrasound showed gall stones. Pt complains of RUQ and Epigastric pains. The pain is sharp in character, not related to meals or bowel movements. The pain radiates to the Rt back but not to the shoulder. Symptoms started 3 yrs ago., but has worsened last couple months. Patient states that it feels like someone is stabbing her in the abdomen. Patient rates her patient at 10/10 in it's highest intensity. Pt has intolerance to greasy and spicy foods. Pt also complains of nausea, no vomiting. No diarrhea or constipation. No change in the color of the stools. No fever or chills.      RIGHT UPPER QUADRANT ULTRASOUND     3/17/2020 10:36 am     COMPARISON:  CT abdomen and pelvis February 16, 2020.     HISTORY:  ORDERING SYSTEM PROVIDED HISTORY: Generalized abdominal pain  TECHNOLOGIST PROVIDED HISTORY:        FINDINGS:  LIVER:  The liver demonstrates normal echogenicity without evidence of  intrahepatic biliary ductal dilatation.  No focal mass.  Hepatopetal flow  seen within the portal vein.     BILIARY SYSTEM:  Cholelithiasis.  No gallbladder wall thickening or  pericholecystic fluid.  Negative Lau's sign.     Common bile duct is within normal limits measuring 4.6 mm.     RIGHT KIDNEY: The right kidney is grossly unremarkable without evidence of  hydronephrosis.     PANCREAS:  Visualized portions of the pancreas are unremarkable.     OTHER: No evidence of right upper quadrant ascites.      Impression  Cholelithiasis without ultrasound evidence of acute cholecystitis.     PAST MEDICAL HISTORY     Past Medical History:

## 2020-04-20 ENCOUNTER — APPOINTMENT (OUTPATIENT)
Dept: CT IMAGING | Age: 40
End: 2020-04-20
Payer: COMMERCIAL

## 2020-04-20 ENCOUNTER — HOSPITAL ENCOUNTER (EMERGENCY)
Age: 40
Discharge: HOME | End: 2020-04-20
Attending: EMERGENCY MEDICINE
Payer: COMMERCIAL

## 2020-04-20 VITALS
HEART RATE: 112 BPM | OXYGEN SATURATION: 99 % | WEIGHT: 250 LBS | BODY MASS INDEX: 41.65 KG/M2 | TEMPERATURE: 99 F | RESPIRATION RATE: 18 BRPM | HEIGHT: 65 IN | SYSTOLIC BLOOD PRESSURE: 158 MMHG | DIASTOLIC BLOOD PRESSURE: 122 MMHG

## 2020-04-20 LAB
ABSOLUTE EOS #: 0.3 K/UL (ref 0–0.4)
ABSOLUTE IMMATURE GRANULOCYTE: ABNORMAL K/UL (ref 0–0.3)
ABSOLUTE LYMPH #: 2.4 K/UL (ref 1–4.8)
ABSOLUTE MONO #: 0.5 K/UL (ref 0.1–1.3)
ALBUMIN SERPL-MCNC: 3.8 G/DL (ref 3.5–5.2)
ALBUMIN/GLOBULIN RATIO: ABNORMAL (ref 1–2.5)
ALP BLD-CCNC: 79 U/L (ref 35–104)
ALT SERPL-CCNC: 14 U/L (ref 5–33)
ANION GAP SERPL CALCULATED.3IONS-SCNC: 9 MMOL/L (ref 9–17)
AST SERPL-CCNC: 14 U/L
BASOPHILS # BLD: 0 % (ref 0–2)
BASOPHILS ABSOLUTE: 0 K/UL (ref 0–0.2)
BILIRUB SERPL-MCNC: <0.15 MG/DL (ref 0.3–1.2)
BUN BLDV-MCNC: 10 MG/DL (ref 6–20)
BUN/CREAT BLD: ABNORMAL (ref 9–20)
CALCIUM SERPL-MCNC: 8.7 MG/DL (ref 8.6–10.4)
CHLORIDE BLD-SCNC: 107 MMOL/L (ref 98–107)
CO2: 23 MMOL/L (ref 20–31)
CREAT SERPL-MCNC: 1.12 MG/DL (ref 0.5–0.9)
DIFFERENTIAL TYPE: ABNORMAL
EOSINOPHILS RELATIVE PERCENT: 5 % (ref 0–4)
GFR AFRICAN AMERICAN: >60 ML/MIN
GFR NON-AFRICAN AMERICAN: 55 ML/MIN
GFR SERPL CREATININE-BSD FRML MDRD: ABNORMAL ML/MIN/{1.73_M2}
GFR SERPL CREATININE-BSD FRML MDRD: ABNORMAL ML/MIN/{1.73_M2}
GLUCOSE BLD-MCNC: 130 MG/DL (ref 70–99)
HCG QUALITATIVE: NEGATIVE
HCT VFR BLD CALC: 35.6 % (ref 36–46)
HEMOGLOBIN: 11.5 G/DL (ref 12–16)
IMMATURE GRANULOCYTES: ABNORMAL %
LIPASE: 32 U/L (ref 13–60)
LYMPHOCYTES # BLD: 33 % (ref 24–44)
MCH RBC QN AUTO: 26 PG (ref 26–34)
MCHC RBC AUTO-ENTMCNC: 32.3 G/DL (ref 31–37)
MCV RBC AUTO: 80.5 FL (ref 80–100)
MONOCYTES # BLD: 7 % (ref 1–7)
NRBC AUTOMATED: ABNORMAL PER 100 WBC
PDW BLD-RTO: 17.6 % (ref 11.5–14.9)
PLATELET # BLD: 330 K/UL (ref 150–450)
PLATELET ESTIMATE: ABNORMAL
PMV BLD AUTO: 7.6 FL (ref 6–12)
POTASSIUM SERPL-SCNC: 4 MMOL/L (ref 3.7–5.3)
RBC # BLD: 4.43 M/UL (ref 4–5.2)
RBC # BLD: ABNORMAL 10*6/UL
SEG NEUTROPHILS: 55 % (ref 36–66)
SEGMENTED NEUTROPHILS ABSOLUTE COUNT: 3.9 K/UL (ref 1.3–9.1)
SODIUM BLD-SCNC: 139 MMOL/L (ref 135–144)
TOTAL PROTEIN: 7.3 G/DL (ref 6.4–8.3)
WBC # BLD: 7.1 K/UL (ref 3.5–11)
WBC # BLD: ABNORMAL 10*3/UL

## 2020-04-20 PROCEDURE — 6360000004 HC RX CONTRAST MEDICATION: Performed by: EMERGENCY MEDICINE

## 2020-04-20 PROCEDURE — 84703 CHORIONIC GONADOTROPIN ASSAY: CPT

## 2020-04-20 PROCEDURE — 36415 COLL VENOUS BLD VENIPUNCTURE: CPT

## 2020-04-20 PROCEDURE — 74177 CT ABD & PELVIS W/CONTRAST: CPT

## 2020-04-20 PROCEDURE — 2580000003 HC RX 258: Performed by: EMERGENCY MEDICINE

## 2020-04-20 PROCEDURE — 85025 COMPLETE CBC W/AUTO DIFF WBC: CPT

## 2020-04-20 PROCEDURE — 6360000002 HC RX W HCPCS: Performed by: EMERGENCY MEDICINE

## 2020-04-20 PROCEDURE — 96374 THER/PROPH/DIAG INJ IV PUSH: CPT

## 2020-04-20 PROCEDURE — 80053 COMPREHEN METABOLIC PANEL: CPT

## 2020-04-20 PROCEDURE — 99284 EMERGENCY DEPT VISIT MOD MDM: CPT

## 2020-04-20 PROCEDURE — 96375 TX/PRO/DX INJ NEW DRUG ADDON: CPT

## 2020-04-20 PROCEDURE — 83690 ASSAY OF LIPASE: CPT

## 2020-04-20 RX ORDER — 0.9 % SODIUM CHLORIDE 0.9 %
80 INTRAVENOUS SOLUTION INTRAVENOUS ONCE
Status: COMPLETED | OUTPATIENT
Start: 2020-04-20 | End: 2020-04-20

## 2020-04-20 RX ORDER — ONDANSETRON 2 MG/ML
4 INJECTION INTRAMUSCULAR; INTRAVENOUS ONCE
Status: COMPLETED | OUTPATIENT
Start: 2020-04-20 | End: 2020-04-20

## 2020-04-20 RX ORDER — SODIUM CHLORIDE 0.9 % (FLUSH) 0.9 %
10 SYRINGE (ML) INJECTION PRN
Status: DISCONTINUED | OUTPATIENT
Start: 2020-04-20 | End: 2020-04-20 | Stop reason: HOSPADM

## 2020-04-20 RX ORDER — MORPHINE SULFATE 4 MG/ML
4 INJECTION, SOLUTION INTRAMUSCULAR; INTRAVENOUS ONCE
Status: COMPLETED | OUTPATIENT
Start: 2020-04-20 | End: 2020-04-20

## 2020-04-20 RX ADMIN — ONDANSETRON 4 MG: 2 INJECTION INTRAMUSCULAR; INTRAVENOUS at 05:25

## 2020-04-20 RX ADMIN — IOVERSOL 75 ML: 741 INJECTION INTRA-ARTERIAL; INTRAVENOUS at 06:09

## 2020-04-20 RX ADMIN — SODIUM CHLORIDE 80 ML: 9 INJECTION, SOLUTION INTRAVENOUS at 06:09

## 2020-04-20 RX ADMIN — Medication 10 ML: at 06:10

## 2020-04-20 RX ADMIN — MORPHINE SULFATE 4 MG: 4 INJECTION, SOLUTION INTRAMUSCULAR; INTRAVENOUS at 05:25

## 2020-04-20 ASSESSMENT — PAIN DESCRIPTION - ORIENTATION: ORIENTATION: LEFT;UPPER

## 2020-04-20 ASSESSMENT — PAIN DESCRIPTION - LOCATION: LOCATION: ABDOMEN

## 2020-04-20 ASSESSMENT — PAIN DESCRIPTION - PAIN TYPE: TYPE: ACUTE PAIN

## 2020-04-20 ASSESSMENT — PAIN DESCRIPTION - FREQUENCY: FREQUENCY: CONTINUOUS

## 2020-04-20 ASSESSMENT — PAIN DESCRIPTION - DESCRIPTORS: DESCRIPTORS: SHARP

## 2020-04-20 ASSESSMENT — PAIN SCALES - GENERAL: PAINLEVEL_OUTOF10: 10

## 2020-04-20 NOTE — ED PROVIDER NOTES
appropriate mood and affect  -----------------------  -----------------------  MEDICAL DECISION MAKING  Differential Diagnosis:  - Consideration is given for    appendicitis, cholecystitis,  diverticulitis, SBO, hernia, urinary tract infection, pyelonephritis, nephrolithiasis, pancreatitis, dissection, ischemia to reproductive organs, STD, ischemic colitis, perforation, intra abdominal bleeding, GI bleed, DKA, ACS,  -  #Impression/Plan:  - Clinically patient's presentation is most consistent with biliary colic. Will get laboratory testing imaging. Will attempt symptomatic treatment. If all work-up is unremarkable will discharge home. Clinically patient is well-appearing at this time in no acute distress.  -  ##Reevaluation/Conversations on care:  -   -   -----------------------  -----------------------  Laly Khoury MD, [facility]  Emergency Medicine Attending  Questions? Please contact my cell phone anytime. [phone]  *This charting supersedes any ED resident or staff charting and was written using speech recognition software    ## The patient was evaluated during the global COVID-19 pandemic, and that diagnosis was suspected/considered upon their initial presentation. PASTMEDICAL HISTORY     Past Medical History:   Diagnosis Date    Arthritis     Bursitis     Fibromyalgia     GERD (gastroesophageal reflux disease)     Migraine     Osteoarthritis     Renal failure     Stroke (cerebrum) ([facility] [address] 2013    TIA    Tendonitis      SURGICAL HISTORY       Past Surgical History:   Procedure Laterality Date    [facility]ST REMOVAL      [facility]     TUBAL LIGATION      2008    UPPER GASTROINTESTINAL ENDOSCOPY  11/2/2018    EGD BIOPSY performed by Clifford Pantoja DO at [facility]    WISDOM TOOTH EXTRACTION       CURRENT MEDICATIONS       Previous Medications    OXYCODONE-ACETAMINOPHEN (PERCOCET) 5-325 MG PER TABLET    Take 1 tablet by mouth every 4 hours as needed for Pain. Leslie Prey      ALLERGIES     is allergic

## 2020-04-21 ENCOUNTER — HOSPITAL ENCOUNTER (OUTPATIENT)
Age: 40
Discharge: HOME | End: 2020-04-21
Payer: COMMERCIAL

## 2020-04-21 LAB
SARS-COV-2, PCR: NORMAL
SARS-COV-2: NOT DETECTED
SOURCE: NORMAL

## 2020-04-21 PROCEDURE — U0002 COVID-19 LAB TEST NON-CDC: HCPCS

## 2020-04-22 ENCOUNTER — TELEPHONE (OUTPATIENT)
Dept: PRIMARY CARE CLINIC | Age: 40
End: 2020-04-22

## 2020-04-23 ENCOUNTER — ANESTHESIA EVENT (OUTPATIENT)
Dept: OPERATING ROOM | Age: 40
End: 2020-04-23
Payer: COMMERCIAL

## 2020-04-23 ENCOUNTER — HOSPITAL ENCOUNTER (OUTPATIENT)
Age: 40
Setting detail: OUTPATIENT SURGERY
Discharge: HOME | End: 2020-04-23
Attending: SURGERY | Admitting: SURGERY
Payer: COMMERCIAL

## 2020-04-23 ENCOUNTER — ANESTHESIA (OUTPATIENT)
Dept: OPERATING ROOM | Age: 40
End: 2020-04-23
Payer: COMMERCIAL

## 2020-04-23 VITALS
HEIGHT: 62 IN | TEMPERATURE: 97.8 F | HEART RATE: 96 BPM | WEIGHT: 250 LBS | BODY MASS INDEX: 46.01 KG/M2 | OXYGEN SATURATION: 97 % | RESPIRATION RATE: 16 BRPM | DIASTOLIC BLOOD PRESSURE: 71 MMHG | SYSTOLIC BLOOD PRESSURE: 124 MMHG

## 2020-04-23 VITALS — SYSTOLIC BLOOD PRESSURE: 132 MMHG | OXYGEN SATURATION: 91 % | TEMPERATURE: 95.9 F | DIASTOLIC BLOOD PRESSURE: 68 MMHG

## 2020-04-23 PROBLEM — Z90.49 S/P LAPAROSCOPIC CHOLECYSTECTOMY: Chronic | Status: ACTIVE | Noted: 2020-04-23

## 2020-04-23 PROBLEM — K80.50 BILIARY COLIC: Status: ACTIVE | Noted: 2020-04-23

## 2020-04-23 PROBLEM — M76.899: Status: ACTIVE | Noted: 2017-04-12

## 2020-04-23 PROBLEM — M16.0 PRIMARY OSTEOARTHRITIS OF BOTH HIPS: Status: ACTIVE | Noted: 2017-04-12

## 2020-04-23 LAB
-: NORMAL
HCG, PREGNANCY URINE (POC): NEGATIVE

## 2020-04-23 PROCEDURE — 94640 AIRWAY INHALATION TREATMENT: CPT

## 2020-04-23 PROCEDURE — 2580000003 HC RX 258

## 2020-04-23 PROCEDURE — 2720000010 HC SURG SUPPLY STERILE: Performed by: SURGERY

## 2020-04-23 PROCEDURE — 6360000002 HC RX W HCPCS: Performed by: SURGERY

## 2020-04-23 PROCEDURE — 3600000003 HC SURGERY LEVEL 3 BASE: Performed by: SURGERY

## 2020-04-23 PROCEDURE — 7100000000 HC PACU RECOVERY - FIRST 15 MIN: Performed by: SURGERY

## 2020-04-23 PROCEDURE — 2580000003 HC RX 258: Performed by: ANESTHESIOLOGY

## 2020-04-23 PROCEDURE — 6360000002 HC RX W HCPCS

## 2020-04-23 PROCEDURE — 88304 TISSUE EXAM BY PATHOLOGIST: CPT

## 2020-04-23 PROCEDURE — 3600000013 HC SURGERY LEVEL 3 ADDTL 15MIN: Performed by: SURGERY

## 2020-04-23 PROCEDURE — 7100000011 HC PHASE II RECOVERY - ADDTL 15 MIN: Performed by: SURGERY

## 2020-04-23 PROCEDURE — 81025 URINE PREGNANCY TEST: CPT

## 2020-04-23 PROCEDURE — 7100000001 HC PACU RECOVERY - ADDTL 15 MIN: Performed by: SURGERY

## 2020-04-23 PROCEDURE — 3700000000 HC ANESTHESIA ATTENDED CARE: Performed by: SURGERY

## 2020-04-23 PROCEDURE — 7100000010 HC PHASE II RECOVERY - FIRST 15 MIN: Performed by: SURGERY

## 2020-04-23 PROCEDURE — 3700000001 HC ADD 15 MINUTES (ANESTHESIA): Performed by: SURGERY

## 2020-04-23 PROCEDURE — 2500000003 HC RX 250 WO HCPCS

## 2020-04-23 PROCEDURE — 2709999900 HC NON-CHARGEABLE SUPPLY: Performed by: SURGERY

## 2020-04-23 PROCEDURE — 6370000000 HC RX 637 (ALT 250 FOR IP): Performed by: ANESTHESIOLOGY

## 2020-04-23 PROCEDURE — 7100000031 HC ASPR PHASE II RECOVERY - ADDTL 15 MIN: Performed by: SURGERY

## 2020-04-23 PROCEDURE — 7100000030 HC ASPR PHASE II RECOVERY - FIRST 15 MIN: Performed by: SURGERY

## 2020-04-23 PROCEDURE — 6360000002 HC RX W HCPCS: Performed by: ANESTHESIOLOGY

## 2020-04-23 PROCEDURE — 2500000003 HC RX 250 WO HCPCS: Performed by: SURGERY

## 2020-04-23 RX ORDER — DEXAMETHASONE SODIUM PHOSPHATE 4 MG/ML
INJECTION, SOLUTION INTRA-ARTICULAR; INTRALESIONAL; INTRAMUSCULAR; INTRAVENOUS; SOFT TISSUE PRN
Status: DISCONTINUED | OUTPATIENT
Start: 2020-04-23 | End: 2020-04-23 | Stop reason: SDUPTHER

## 2020-04-23 RX ORDER — DOCUSATE SODIUM 100 MG/1
100 CAPSULE, LIQUID FILLED ORAL 2 TIMES DAILY
Qty: 30 CAPSULE | Refills: 2 | Status: SHIPPED | OUTPATIENT
Start: 2020-04-23 | End: 2021-02-05

## 2020-04-23 RX ORDER — ONDANSETRON 4 MG/1
4 TABLET, ORALLY DISINTEGRATING ORAL EVERY 8 HOURS PRN
Qty: 15 TABLET | Refills: 0 | Status: SHIPPED | OUTPATIENT
Start: 2020-04-23 | End: 2021-02-05

## 2020-04-23 RX ORDER — KETOROLAC TROMETHAMINE 30 MG/ML
15 INJECTION, SOLUTION INTRAMUSCULAR; INTRAVENOUS
Status: COMPLETED | OUTPATIENT
Start: 2020-04-23 | End: 2020-04-23

## 2020-04-23 RX ORDER — IBUPROFEN 800 MG/1
800 TABLET ORAL EVERY 8 HOURS PRN
Qty: 30 TABLET | Refills: 0 | Status: SHIPPED | OUTPATIENT
Start: 2020-04-23 | End: 2021-02-05

## 2020-04-23 RX ORDER — LABETALOL 20 MG/4 ML (5 MG/ML) INTRAVENOUS SYRINGE
5 EVERY 10 MIN PRN
Status: DISCONTINUED | OUTPATIENT
Start: 2020-04-23 | End: 2020-04-23 | Stop reason: HOSPADM

## 2020-04-23 RX ORDER — ONDANSETRON 2 MG/ML
4 INJECTION INTRAMUSCULAR; INTRAVENOUS
Status: DISCONTINUED | OUTPATIENT
Start: 2020-04-23 | End: 2020-04-23 | Stop reason: HOSPADM

## 2020-04-23 RX ORDER — OXYCODONE HYDROCHLORIDE AND ACETAMINOPHEN 5; 325 MG/1; MG/1
2 TABLET ORAL PRN
Status: COMPLETED | OUTPATIENT
Start: 2020-04-23 | End: 2020-04-23

## 2020-04-23 RX ORDER — DIPHENHYDRAMINE HYDROCHLORIDE 50 MG/ML
12.5 INJECTION INTRAMUSCULAR; INTRAVENOUS
Status: COMPLETED | OUTPATIENT
Start: 2020-04-23 | End: 2020-04-23

## 2020-04-23 RX ORDER — ONDANSETRON 2 MG/ML
INJECTION INTRAMUSCULAR; INTRAVENOUS PRN
Status: DISCONTINUED | OUTPATIENT
Start: 2020-04-23 | End: 2020-04-23 | Stop reason: SDUPTHER

## 2020-04-23 RX ORDER — IPRATROPIUM BROMIDE AND ALBUTEROL SULFATE 2.5; .5 MG/3ML; MG/3ML
1 SOLUTION RESPIRATORY (INHALATION)
Status: COMPLETED | OUTPATIENT
Start: 2020-04-23 | End: 2020-04-23

## 2020-04-23 RX ORDER — MEPERIDINE HYDROCHLORIDE 25 MG/ML
12.5 INJECTION INTRAMUSCULAR; INTRAVENOUS; SUBCUTANEOUS EVERY 5 MIN PRN
Status: DISCONTINUED | OUTPATIENT
Start: 2020-04-23 | End: 2020-04-23 | Stop reason: HOSPADM

## 2020-04-23 RX ORDER — MIDAZOLAM HYDROCHLORIDE 1 MG/ML
INJECTION INTRAMUSCULAR; INTRAVENOUS PRN
Status: DISCONTINUED | OUTPATIENT
Start: 2020-04-23 | End: 2020-04-23 | Stop reason: SDUPTHER

## 2020-04-23 RX ORDER — SODIUM CHLORIDE 9 MG/ML
INJECTION INTRAVENOUS PRN
Status: DISCONTINUED | OUTPATIENT
Start: 2020-04-23 | End: 2020-04-23 | Stop reason: SDUPTHER

## 2020-04-23 RX ORDER — ROCURONIUM BROMIDE 10 MG/ML
INJECTION, SOLUTION INTRAVENOUS PRN
Status: DISCONTINUED | OUTPATIENT
Start: 2020-04-23 | End: 2020-04-23 | Stop reason: SDUPTHER

## 2020-04-23 RX ORDER — NEOSTIGMINE METHYLSULFATE 5 MG/5 ML
SYRINGE (ML) INTRAVENOUS PRN
Status: DISCONTINUED | OUTPATIENT
Start: 2020-04-23 | End: 2020-04-23 | Stop reason: SDUPTHER

## 2020-04-23 RX ORDER — PROPOFOL 10 MG/ML
INJECTION, EMULSION INTRAVENOUS PRN
Status: DISCONTINUED | OUTPATIENT
Start: 2020-04-23 | End: 2020-04-23 | Stop reason: SDUPTHER

## 2020-04-23 RX ORDER — LIDOCAINE HYDROCHLORIDE 10 MG/ML
INJECTION, SOLUTION EPIDURAL; INFILTRATION; INTRACAUDAL; PERINEURAL PRN
Status: DISCONTINUED | OUTPATIENT
Start: 2020-04-23 | End: 2020-04-23 | Stop reason: SDUPTHER

## 2020-04-23 RX ORDER — SODIUM CHLORIDE, SODIUM LACTATE, POTASSIUM CHLORIDE, CALCIUM CHLORIDE 600; 310; 30; 20 MG/100ML; MG/100ML; MG/100ML; MG/100ML
INJECTION, SOLUTION INTRAVENOUS CONTINUOUS
Status: DISCONTINUED | OUTPATIENT
Start: 2020-04-23 | End: 2020-04-23 | Stop reason: HOSPADM

## 2020-04-23 RX ORDER — GLYCOPYRROLATE 1 MG/5 ML
SYRINGE (ML) INTRAVENOUS PRN
Status: DISCONTINUED | OUTPATIENT
Start: 2020-04-23 | End: 2020-04-23 | Stop reason: SDUPTHER

## 2020-04-23 RX ORDER — FENTANYL CITRATE 50 UG/ML
INJECTION, SOLUTION INTRAMUSCULAR; INTRAVENOUS PRN
Status: DISCONTINUED | OUTPATIENT
Start: 2020-04-23 | End: 2020-04-23 | Stop reason: SDUPTHER

## 2020-04-23 RX ORDER — OXYCODONE HYDROCHLORIDE AND ACETAMINOPHEN 5; 325 MG/1; MG/1
1 TABLET ORAL EVERY 6 HOURS PRN
Qty: 28 TABLET | Refills: 0 | Status: SHIPPED | OUTPATIENT
Start: 2020-04-23 | End: 2020-04-30

## 2020-04-23 RX ORDER — BUPIVACAINE HYDROCHLORIDE 5 MG/ML
INJECTION, SOLUTION EPIDURAL; INTRACAUDAL PRN
Status: DISCONTINUED | OUTPATIENT
Start: 2020-04-23 | End: 2020-04-23 | Stop reason: ALTCHOICE

## 2020-04-23 RX ORDER — OXYCODONE HYDROCHLORIDE AND ACETAMINOPHEN 5; 325 MG/1; MG/1
1 TABLET ORAL PRN
Status: COMPLETED | OUTPATIENT
Start: 2020-04-23 | End: 2020-04-23

## 2020-04-23 RX ORDER — KETOROLAC TROMETHAMINE 30 MG/ML
15 INJECTION, SOLUTION INTRAMUSCULAR; INTRAVENOUS
Status: DISCONTINUED | OUTPATIENT
Start: 2020-04-23 | End: 2020-04-23

## 2020-04-23 RX ADMIN — OXYCODONE HYDROCHLORIDE AND ACETAMINOPHEN 1 TABLET: 5; 325 TABLET ORAL at 15:55

## 2020-04-23 RX ADMIN — HYDROMORPHONE HYDROCHLORIDE 0.25 MG: 1 INJECTION, SOLUTION INTRAMUSCULAR; INTRAVENOUS; SUBCUTANEOUS at 13:59

## 2020-04-23 RX ADMIN — SODIUM CHLORIDE 10 ML: 9 INJECTION INTRAMUSCULAR; INTRAVENOUS; SUBCUTANEOUS at 12:04

## 2020-04-23 RX ADMIN — ROCURONIUM BROMIDE 40 MG: 10 INJECTION, SOLUTION INTRAVENOUS at 12:02

## 2020-04-23 RX ADMIN — Medication 3 MG: at 12:54

## 2020-04-23 RX ADMIN — SUGAMMADEX 200 MG: 100 INJECTION, SOLUTION INTRAVENOUS at 13:08

## 2020-04-23 RX ADMIN — SODIUM CHLORIDE, POTASSIUM CHLORIDE, SODIUM LACTATE AND CALCIUM CHLORIDE: 600; 310; 30; 20 INJECTION, SOLUTION INTRAVENOUS at 11:14

## 2020-04-23 RX ADMIN — FENTANYL CITRATE 100 MCG: 50 INJECTION, SOLUTION INTRAMUSCULAR; INTRAVENOUS at 12:02

## 2020-04-23 RX ADMIN — CEFAZOLIN 2 G: 10 INJECTION, POWDER, FOR SOLUTION INTRAVENOUS at 12:04

## 2020-04-23 RX ADMIN — DIPHENHYDRAMINE HYDROCHLORIDE 12.5 MG: 50 INJECTION, SOLUTION INTRAMUSCULAR; INTRAVENOUS at 14:29

## 2020-04-23 RX ADMIN — KETOROLAC TROMETHAMINE 15 MG: 30 INJECTION, SOLUTION INTRAMUSCULAR; INTRAVENOUS at 14:32

## 2020-04-23 RX ADMIN — FENTANYL CITRATE 50 MCG: 50 INJECTION, SOLUTION INTRAMUSCULAR; INTRAVENOUS at 12:17

## 2020-04-23 RX ADMIN — FENTANYL CITRATE 50 MCG: 50 INJECTION, SOLUTION INTRAMUSCULAR; INTRAVENOUS at 12:28

## 2020-04-23 RX ADMIN — HYDROMORPHONE HYDROCHLORIDE 0.5 MG: 1 INJECTION, SOLUTION INTRAMUSCULAR; INTRAVENOUS; SUBCUTANEOUS at 13:44

## 2020-04-23 RX ADMIN — IPRATROPIUM BROMIDE AND ALBUTEROL SULFATE 1 AMPULE: .5; 3 SOLUTION RESPIRATORY (INHALATION) at 13:34

## 2020-04-23 RX ADMIN — ONDANSETRON 4 MG: 2 INJECTION INTRAMUSCULAR; INTRAVENOUS at 12:08

## 2020-04-23 RX ADMIN — PROPOFOL 200 MG: 10 INJECTION, EMULSION INTRAVENOUS at 12:02

## 2020-04-23 RX ADMIN — MIDAZOLAM 2 MG: 1 INJECTION INTRAMUSCULAR; INTRAVENOUS at 11:56

## 2020-04-23 RX ADMIN — DEXAMETHASONE SODIUM PHOSPHATE 4 MG: 4 INJECTION, SOLUTION INTRA-ARTICULAR; INTRALESIONAL; INTRAMUSCULAR; INTRAVENOUS; SOFT TISSUE at 12:08

## 2020-04-23 RX ADMIN — LIDOCAINE HYDROCHLORIDE 50 MG: 10 INJECTION, SOLUTION EPIDURAL; INFILTRATION; INTRACAUDAL; PERINEURAL at 12:02

## 2020-04-23 RX ADMIN — Medication 0.6 MG: at 12:54

## 2020-04-23 RX ADMIN — HYDROMORPHONE HYDROCHLORIDE 0.25 MG: 1 INJECTION, SOLUTION INTRAMUSCULAR; INTRAVENOUS; SUBCUTANEOUS at 13:30

## 2020-04-23 RX ADMIN — SODIUM CHLORIDE, POTASSIUM CHLORIDE, SODIUM LACTATE AND CALCIUM CHLORIDE: 600; 310; 30; 20 INJECTION, SOLUTION INTRAVENOUS at 13:49

## 2020-04-23 RX ADMIN — FENTANYL CITRATE 50 MCG: 50 INJECTION, SOLUTION INTRAMUSCULAR; INTRAVENOUS at 12:38

## 2020-04-23 RX ADMIN — HYDROMORPHONE HYDROCHLORIDE 0.5 MG: 1 INJECTION, SOLUTION INTRAMUSCULAR; INTRAVENOUS; SUBCUTANEOUS at 14:12

## 2020-04-23 RX ADMIN — FENTANYL CITRATE 50 MCG: 50 INJECTION, SOLUTION INTRAMUSCULAR; INTRAVENOUS at 13:06

## 2020-04-23 ASSESSMENT — PAIN SCALES - GENERAL
PAINLEVEL_OUTOF10: 9
PAINLEVEL_OUTOF10: 10
PAINLEVEL_OUTOF10: 9
PAINLEVEL_OUTOF10: 6
PAINLEVEL_OUTOF10: 9
PAINLEVEL_OUTOF10: 9
PAINLEVEL_OUTOF10: 5
PAINLEVEL_OUTOF10: 6
PAINLEVEL_OUTOF10: 4
PAINLEVEL_OUTOF10: 8

## 2020-04-23 ASSESSMENT — PAIN DESCRIPTION - ORIENTATION
ORIENTATION: RIGHT

## 2020-04-23 ASSESSMENT — PULMONARY FUNCTION TESTS
PIF_VALUE: 29
PIF_VALUE: 2
PIF_VALUE: 25
PIF_VALUE: 1
PIF_VALUE: 26
PIF_VALUE: 30
PIF_VALUE: 25
PIF_VALUE: 25
PIF_VALUE: 29
PIF_VALUE: 24
PIF_VALUE: 23
PIF_VALUE: 25
PIF_VALUE: 19
PIF_VALUE: 23
PIF_VALUE: 3
PIF_VALUE: 24
PIF_VALUE: 24
PIF_VALUE: 23
PIF_VALUE: 2
PIF_VALUE: 27
PIF_VALUE: 31
PIF_VALUE: 25
PIF_VALUE: 26
PIF_VALUE: 23
PIF_VALUE: 29
PIF_VALUE: 27
PIF_VALUE: 27
PIF_VALUE: 29
PIF_VALUE: 29
PIF_VALUE: 28
PIF_VALUE: 29
PIF_VALUE: 24
PIF_VALUE: 24
PIF_VALUE: 27
PIF_VALUE: 36
PIF_VALUE: 3
PIF_VALUE: 24
PIF_VALUE: 29
PIF_VALUE: 0
PIF_VALUE: 28
PIF_VALUE: 1
PIF_VALUE: 23
PIF_VALUE: 38
PIF_VALUE: 23
PIF_VALUE: 31
PIF_VALUE: 29
PIF_VALUE: 27
PIF_VALUE: 29
PIF_VALUE: 32
PIF_VALUE: 25
PIF_VALUE: 23
PIF_VALUE: 24
PIF_VALUE: 11
PIF_VALUE: 4
PIF_VALUE: 29
PIF_VALUE: 25
PIF_VALUE: 2
PIF_VALUE: 26
PIF_VALUE: 27
PIF_VALUE: 1
PIF_VALUE: 0
PIF_VALUE: 28
PIF_VALUE: 2
PIF_VALUE: 28
PIF_VALUE: 1
PIF_VALUE: 23
PIF_VALUE: 27
PIF_VALUE: 25
PIF_VALUE: 2
PIF_VALUE: 31
PIF_VALUE: 25
PIF_VALUE: 24
PIF_VALUE: 29
PIF_VALUE: 24
PIF_VALUE: 29
PIF_VALUE: 23
PIF_VALUE: 0

## 2020-04-23 ASSESSMENT — PAIN DESCRIPTION - DESCRIPTORS
DESCRIPTORS: ACHING
DESCRIPTORS: STABBING
DESCRIPTORS: ACHING
DESCRIPTORS: SHARP
DESCRIPTORS: PRESSURE

## 2020-04-23 ASSESSMENT — PAIN DESCRIPTION - LOCATION
LOCATION: BACK
LOCATION: ABDOMEN;BACK
LOCATION: ABDOMEN
LOCATION: BACK
LOCATION: BACK
LOCATION: ABDOMEN
LOCATION: BACK
LOCATION: CHEST;ABDOMEN;BACK

## 2020-04-23 ASSESSMENT — PAIN DESCRIPTION - PAIN TYPE
TYPE: SURGICAL PAIN

## 2020-04-23 ASSESSMENT — PAIN - FUNCTIONAL ASSESSMENT: PAIN_FUNCTIONAL_ASSESSMENT: 0-10

## 2020-04-23 NOTE — ANESTHESIA PRE PROCEDURE
Department of Anesthesiology  Preprocedure Note       Name:  Dany Hamilton   Age:  42 y.o.  :  1980                                          MRN:  [id number]         Date:  2020      Surgeon: Naeem Leigh):  Jimi Vizcarra MD    Procedure: CHOLECYSTECTOMY LAPAROSCOPIC (N/A Abdomen)    Medications prior to admission:   Prior to Admission medications    Medication Sig Start Date End Date Taking? Authorizing Provider   oxyCODONE-acetaminophen (PERCOCET) 5-325 MG per tablet Take 1 tablet by mouth every 4 hours as needed for Pain. Eliana Saunders Historical Provider, MD       Current medications:    No current facility-administered medications for this encounter. Allergies: Allergies   Allergen Reactions    Vicodin [Hydrocodone-Acetaminophen] Hives    Bactrim [Sulfamethoxazole-Trimethoprim]     Codeine     Ibuprofen     Sulfa Antibiotics     Tylenol [Acetaminophen]        Problem List:    Patient Active Problem List   Diagnosis Code    Hiatal hernia with GERD without esophagitis K44.9, K21.9       Past Medical History:        Diagnosis Date    Arthritis     Bursitis     Fibromyalgia     GERD (gastroesophageal reflux disease)     Migraine     Osteoarthritis     Renal failure     St[facility] ([facility]rebrum) ([facility] [address] 2013    TIA    Tendonitis        Past Surgical History:        Procedure Laterality Date    [facility] REMOVAL       [facility]     TUBAL LIGATION          UPPER GASTROINTESTINAL ENDOSCOPY  2018    EGD BIOPSY performed by Ewelina Mejia DO at [facility]    WISDOM TOOTH EXTRACTION         Social History:    Social History     Tobacco Use    Smoking status: Former Smoker    Smokeless tobacco: Never Used   Substance Use Topics    Alcohol use: Yes     Comment: occassional                                Counseling given: Not Answered      Vital Signs (Current): There were no vitals filed for this visit.                                            BP Readings from Last 3 Encounters: comment: Fibromyalgia  Chronic pain - was on percocet GI/Hepatic/Renal:   (+) hiatal hernia, GERD:, morbid obesity         ROS comment: Cholelithiasis  Abdominal Pain. Endo/Other:    (+) : arthritis: OA., .                 Abdominal:           Vascular: negative vascular ROS. Anesthesia Plan      general     ASA 3       Induction: intravenous. MIPS: Postoperative opioids intended and Prophylactic antiemetics administered. Anesthetic plan and risks discussed with patient. Plan discussed with CRNA.                 Judith Galvan MD   4/23/2020

## 2020-04-23 NOTE — OP NOTE
Operative Note      Patient: Lemont Canavan  YOB: 1980  MRN: [id number]    Date of Procedure: 4/23/2020    Pre-Op Diagnosis: CHOLELITHIASIS, RIGHT UPPER QUADRANT PAIN, VOMITING      UPDATE ON ADMIT PER ANES    Post-Op Diagnosis: chronic cholecystitis       Procedure(s): CHOLECYSTECTOMY LAPAROSCOPIC    Surgeon(s):  David Mascorro MD    Assistant: None    Anesthesia: General    Estimated Blood Loss (mL): less than 50 ml    Complications: None    Specimens:   ID Type Source Tests Collected by Time Destination   A :  Tissue Gallbladder SURGICAL PATHOLOGY David Mascorro MD 4/23/2020 1248        Implants:  None      Drains: none    Findings: chronic cholecystitis    Detailed Description of Procedure:     HISTORY: The patient is a 42y.o. year old female with history of above preop diagnosis. I explained the risk, benefits, expected outcome, and alternatives to the procedure. Patient understands and is in agreement. PROCEDURE: The patient was given general anesthesia. The abdomen was prepped and draped in typical sterile fashion. The base of the umbilicus is grasped with a kocher and elevated. A longitudinal incision is made with an #11blade. The base of the umbilicus is grasped with a kocker and 0 Vicryl suture is placed on either side of the fascia. An incision is made in the fascia with a #11 blade and the peritoneum is entered bluntly. The  Bennett canula was inserted and secured into place with the sutures. Pneumoperitoneum was established with CO2 gas with maximum pressure of 15 mm Hg. Under direct vision, a 5 mm bladed trocar was inserted under direct vision in the Right flank and the right mid axillary line after anesthetizing with 0.5% marcaine. The gallbladder was grasped at the fundus with grasper through the right lateral canula and at the infundibulum with a grasper through the right mid canula. The neck of the gallbladder was retracted laterally.   An 5mm  epigastric port was place under direct vision after anesthetizing with 0.5% marcaine. There was omentum adherent to the gallbladder and this was taken down with hook electro bovie cautery. The peritoneum over the gallbladder neck, medially and laterally, were incised with right joshua hook cautery. The cystic duct was isolated. The cystic duct was clipped twice proximally and once distally. The cystic duct was then transected. The cystic artery was then isolated and doubly clipped proximally and singly distally then transected. The gallbladder was dissected from the neck towards the fundus with right angle hook cautery. Once the gallbladder was removed, the camera was switched to the epigastric port and then gallbladder was removed through the umbilical incision using a laparoscopic bag. There was no bile leaking or bleeding. The cannulas were removed under direct vision with no bleeding. The pneumoperitoneum was released then the umbilical cannula removed. The fascia of the umbilicus is closed with 0 vicryl suture in a figure of eight. The skin is closed with interrupted 4-0 monocryl suture.   Dermaflex is placed over the skin for a dressing    Electronically signed by Beverly Shanks MD on 4/23/2020 at 1:03 PM

## 2020-04-23 NOTE — INTERVAL H&P NOTE
HISTORY and Treinta ELVIA Steel [address]47       NAME:  Enriqueta Villalta  MRN: [id number]   YOB: 1980   Date: 4/23/2020   Age: 42 y.o. Gender: female     H&P Update Note    H&P from 4/6/2020 reviewed and updated. Patient examined. INTERVAL HISTORY:     Patient is feeling well today, denies any fever/chills, chest pain, shortness of breath. No interval changes. No interval changes to past medical history, social history, family history. Review of systems as stated above and otherwise negative. PHYSICAL EXAM:     Vitals: /85   Pulse 98   Temp 98.8 °F (37.1 °C) (Oral)   Resp 18   Ht 5' 1.5\" (1.562 m)   Wt 250 lb (113.4 kg)   LMP 04/02/2020   SpO2 100%   BMI 46.47 kg/m²  Body mass index is 46.47 kg/m². Patient is alert and oriented, in no distress. Heart rate and rhythm are regular. Lungs clear to auscultation bilaterally. Abdomen is soft, tenderness to RUQ, LUQ, BS normoactive. 1+ non pitting LE edema. No interval changes. I concur with the findings.      ADRYAN Mariscal CNP on 4/23/2020 at 11:08 AM      Electronically signed by ADRYAN Mariscal CNP on 4/23/2020 at 11:08 AM

## 2020-04-28 LAB — SURGICAL PATHOLOGY REPORT: NORMAL

## 2021-02-05 ENCOUNTER — HOSPITAL ENCOUNTER (OUTPATIENT)
Age: 41
Setting detail: SPECIMEN
Discharge: HOME | End: 2021-02-05
Payer: COMMERCIAL

## 2021-02-05 DIAGNOSIS — M51.36 DDD (DEGENERATIVE DISC DISEASE), LUMBAR: ICD-10-CM

## 2021-02-05 DIAGNOSIS — Z13.29 THYROID DISORDER SCREENING: ICD-10-CM

## 2021-02-05 DIAGNOSIS — Z83.3 FAMILY HISTORY OF DIABETES MELLITUS: ICD-10-CM

## 2021-02-05 DIAGNOSIS — E55.9 VITAMIN D INSUFFICIENCY: ICD-10-CM

## 2021-02-05 DIAGNOSIS — Z11.59 ENCOUNTER FOR HEPATITIS C SCREENING TEST FOR LOW RISK PATIENT: ICD-10-CM

## 2021-02-05 DIAGNOSIS — K80.50 BILIARY COLIC: ICD-10-CM

## 2021-02-05 DIAGNOSIS — M16.0 PRIMARY OSTEOARTHRITIS OF BOTH HIPS: ICD-10-CM

## 2021-02-05 DIAGNOSIS — R11.14 BILIOUS VOMITING WITH NAUSEA: ICD-10-CM

## 2021-02-05 DIAGNOSIS — Z11.4 ENCOUNTER FOR SCREENING FOR HIV: ICD-10-CM

## 2021-02-05 DIAGNOSIS — M15.9 OSTEOARTHRITIS OF MULTIPLE JOINTS, UNSPECIFIED OSTEOARTHRITIS TYPE: ICD-10-CM

## 2021-02-05 DIAGNOSIS — Z13.220 SCREENING CHOLESTEROL LEVEL: ICD-10-CM

## 2021-02-05 DIAGNOSIS — K21.9 GASTROESOPHAGEAL REFLUX DISEASE WITHOUT ESOPHAGITIS: ICD-10-CM

## 2021-02-05 LAB
ABSOLUTE EOS #: 0.31 K/UL (ref 0–0.44)
ABSOLUTE IMMATURE GRANULOCYTE: <0.03 K/UL (ref 0–0.3)
ABSOLUTE LYMPH #: 1.83 K/UL (ref 1.1–3.7)
ABSOLUTE MONO #: 0.52 K/UL (ref 0.1–1.2)
BASOPHILS # BLD: 1 % (ref 0–2)
BASOPHILS ABSOLUTE: 0.03 K/UL (ref 0–0.2)
DIFFERENTIAL TYPE: ABNORMAL
EOSINOPHILS RELATIVE PERCENT: 5 % (ref 1–4)
HCT VFR BLD CALC: 36.9 % (ref 36.3–47.1)
HEMOGLOBIN: 11.1 G/DL (ref 11.9–15.1)
IMMATURE GRANULOCYTES: 0 %
LYMPHOCYTES # BLD: 29 % (ref 24–43)
MCH RBC QN AUTO: 24.9 PG (ref 25.2–33.5)
MCHC RBC AUTO-ENTMCNC: 30.1 G/DL (ref 28.4–34.8)
MCV RBC AUTO: 82.9 FL (ref 82.6–102.9)
MONOCYTES # BLD: 8 % (ref 3–12)
NRBC AUTOMATED: 0 PER 100 WBC
PDW BLD-RTO: 17.3 % (ref 11.8–14.4)
PLATELET # BLD: 407 K/UL (ref 138–453)
PLATELET ESTIMATE: ABNORMAL
PMV BLD AUTO: 10.6 FL (ref 8.1–13.5)
RBC # BLD: 4.45 M/UL (ref 3.95–5.11)
RBC # BLD: ABNORMAL 10*6/UL
SEDIMENTATION RATE, ERYTHROCYTE: 38 MM (ref 0–20)
SEG NEUTROPHILS: 57 % (ref 36–65)
SEGMENTED NEUTROPHILS ABSOLUTE COUNT: 3.65 K/UL (ref 1.5–8.1)
WBC # BLD: 6.4 K/UL (ref 3.5–11.3)
WBC # BLD: ABNORMAL 10*3/UL

## 2021-02-06 LAB
ALBUMIN SERPL-MCNC: 4 G/DL (ref 3.5–5.2)
ALBUMIN/GLOBULIN RATIO: 1 (ref 1–2.5)
ALP BLD-CCNC: 73 U/L (ref 35–104)
ALT SERPL-CCNC: 24 U/L (ref 5–33)
ANION GAP SERPL CALCULATED.3IONS-SCNC: 16 MMOL/L (ref 9–17)
AST SERPL-CCNC: 28 U/L
BILIRUB SERPL-MCNC: 0.23 MG/DL (ref 0.3–1.2)
BUN BLDV-MCNC: 11 MG/DL (ref 6–20)
BUN/CREAT BLD: ABNORMAL (ref 9–20)
C-REACTIVE PROTEIN: 32.2 MG/L (ref 0–5)
CALCIUM SERPL-MCNC: 10 MG/DL (ref 8.6–10.4)
CHLORIDE BLD-SCNC: 106 MMOL/L (ref 98–107)
CHOLESTEROL/HDL RATIO: 3.1
CHOLESTEROL: 127 MG/DL
CO2: 20 MMOL/L (ref 20–31)
CREAT SERPL-MCNC: 0.89 MG/DL (ref 0.5–0.9)
FOLATE: 6.6 NG/ML
GFR AFRICAN AMERICAN: >60 ML/MIN
GFR NON-AFRICAN AMERICAN: >60 ML/MIN
GFR SERPL CREATININE-BSD FRML MDRD: ABNORMAL ML/MIN/{1.73_M2}
GFR SERPL CREATININE-BSD FRML MDRD: ABNORMAL ML/MIN/{1.73_M2}
GLUCOSE BLD-MCNC: 74 MG/DL (ref 70–99)
HDLC SERPL-MCNC: 41 MG/DL
HEPATITIS C ANTIBODY: NONREACTIVE
HIV AG/AB: NONREACTIVE
IRON SATURATION: 14 % (ref 20–55)
IRON: 49 UG/DL (ref 37–145)
LDL CHOLESTEROL: 73 MG/DL (ref 0–130)
LIPASE: 21 U/L (ref 13–60)
POTASSIUM SERPL-SCNC: 4.4 MMOL/L (ref 3.7–5.3)
RHEUMATOID FACTOR: <10 IU/ML
SODIUM BLD-SCNC: 142 MMOL/L (ref 135–144)
TOTAL IRON BINDING CAPACITY: 344 UG/DL (ref 250–450)
TOTAL PROTEIN: 8.1 G/DL (ref 6.4–8.3)
TRIGL SERPL-MCNC: 63 MG/DL
TSH SERPL DL<=0.05 MIU/L-ACNC: 2.33 MIU/L (ref 0.3–5)
UNSATURATED IRON BINDING CAPACITY: 295 UG/DL (ref 112–347)
VITAMIN B-12: 620 PG/ML (ref 232–1245)
VITAMIN D 25-HYDROXY: 12.9 NG/ML (ref 30–100)
VLDLC SERPL CALC-MCNC: NORMAL MG/DL (ref 1–30)

## 2021-02-07 LAB
ESTIMATED AVERAGE GLUCOSE: 120 MG/DL
HBA1C MFR BLD: 5.8 % (ref 4–6)

## 2021-02-11 ENCOUNTER — OFFICE VISIT (OUTPATIENT)
Dept: GASTROENTEROLOGY | Age: 41
End: 2021-02-11
Payer: COMMERCIAL

## 2021-02-11 VITALS
OXYGEN SATURATION: 100 % | DIASTOLIC BLOOD PRESSURE: 95 MMHG | HEART RATE: 86 BPM | SYSTOLIC BLOOD PRESSURE: 166 MMHG | HEIGHT: 62 IN | BODY MASS INDEX: 48.86 KG/M2 | WEIGHT: 265.5 LBS | TEMPERATURE: 98.4 F

## 2021-02-11 DIAGNOSIS — Z80.0 FAMILY HISTORY OF COLON CANCER: ICD-10-CM

## 2021-02-11 DIAGNOSIS — R19.4 BOWEL HABIT CHANGES: ICD-10-CM

## 2021-02-11 DIAGNOSIS — K21.9 CHRONIC GERD: Primary | ICD-10-CM

## 2021-02-11 PROCEDURE — 99204 OFFICE O/P NEW MOD 45 MIN: CPT | Performed by: INTERNAL MEDICINE

## 2021-02-11 PROCEDURE — G8417 CALC BMI ABV UP PARAM F/U: HCPCS | Performed by: INTERNAL MEDICINE

## 2021-02-11 PROCEDURE — G8484 FLU IMMUNIZE NO ADMIN: HCPCS | Performed by: INTERNAL MEDICINE

## 2021-02-11 PROCEDURE — 1036F TOBACCO NON-USER: CPT | Performed by: INTERNAL MEDICINE

## 2021-02-11 PROCEDURE — G8427 DOCREV CUR MEDS BY ELIG CLIN: HCPCS | Performed by: INTERNAL MEDICINE

## 2021-02-11 RX ORDER — SODIUM, POTASSIUM,MAG SULFATES 17.5-3.13G
SOLUTION, RECONSTITUTED, ORAL ORAL
Qty: 1 BOTTLE | Refills: 0 | Status: SHIPPED | OUTPATIENT
Start: 2021-02-11 | End: 2021-02-22

## 2021-02-11 ASSESSMENT — ENCOUNTER SYMPTOMS
APNEA: 0
WHEEZING: 0
BACK PAIN: 0
ANAL BLEEDING: 1
BLOOD IN STOOL: 0
CHOKING: 0
SORE THROAT: 0
RECTAL PAIN: 0
COUGH: 0
VOICE CHANGE: 0
DIARRHEA: 1
ABDOMINAL DISTENTION: 1
ABDOMINAL PAIN: 1
SHORTNESS OF BREATH: 0
CONSTIPATION: 1
VOMITING: 1
TROUBLE SWALLOWING: 0
NAUSEA: 1

## 2021-02-11 NOTE — PROGRESS NOTES
Recently patient had CT of the abdomen and pelvis done twice in 2020. First CT was in February 2020 the second CT was \"20 2020. Looks like this patient had EGD done by a general surgeon in 2018. Also this patient had cholecystectomy done in April 2020, the medication was poor gallbladder ejection fraction. No stones identified. There was no acute cholecystitis at that time. However resected gallbladder specimen did reveal intestinal metaplasia and low-grade dysplasia with focal suspicious high-grade dysplasia. No malignancy was noted. This was followed by general surgery service at that time. Has a family history of colon cancer. Past Medical,Family, and Social History reviewed and does contribute to the patient presentingcondition. Patient's PMH/PSH,SH,PSYCH Hx, MEDs, ALLERGIES, and ROS were all reviewed and updated in the appropriate sections.     PAST MEDICAL HISTORY:  Past Medical History:   Diagnosis Date    Arthritis     Bursitis     Fibroid tumor     uterine    Fibromyalgia     GERD (gastroesophageal reflux disease)     Migraine     Osteoarthritis     Renal failure     Stroke (cerebrum) ([facility] [address] 2013    TIA    Tendonitis        Past Surgical History:   Procedure Laterality Date    CHOLECYSTECTOMY, LAPAROSCOPIC N/A 4/23/2020    CHOLECYSTECTOMY LAPAROSCOPIC performed by Kate Alford MD at [address] [place] [address]      2008 [facility]     TUBAL LIGATION      2008    UPPER GASTROINTESTINAL ENDOSCOPY  11/2/2018    EGD BIOPSY performed by Amy Snowden DO at [facility]    WISDOM TOOTH EXTRACTION         CURRENT MEDICATIONS:    Current Outpatient Medications:     vitamin D (ERGOCALCIFEROL) 1.25 MG (63005 UT) CAPS capsule, Take 1 capsule by mouth once a week, Disp: 4 capsule, Rfl: 5    gabapentin (NEURONTIN) 300 MG capsule, , Disp: , Rfl:     oxyCODONE-acetaminophen (PERCOCET) 5-325 MG per tablet, Take 1 tablet by mouth every 4 hours as needed for Pain., Disp: , Rfl: REVIEW OF SYSTEMS:       Review of Systems   Constitutional: Positive for appetite change (increased, always hungry) and fatigue. HENT: Negative for sore throat, trouble swallowing and voice change. Eyes: Negative for visual disturbance. Respiratory: Negative for apnea, cough, choking, shortness of breath and wheezing. Cardiovascular: Negative for chest pain, palpitations and leg swelling. Gastrointestinal: Positive for abdominal distention, abdominal pain, anal bleeding (hemorrhoids), constipation, diarrhea, nausea and vomiting. Negative for blood in stool and rectal pain. Genitourinary: Negative for difficulty urinating. Musculoskeletal: Negative for arthralgias, back pain, gait problem and myalgias. Neurological: Positive for dizziness and headaches. Negative for tremors, weakness, light-headedness and numbness. Hematological: Does not bruise/bleed easily. Psychiatric/Behavioral: Negative for sleep disturbance. The patient is not nervous/anxious. PHYSICAL EXAMINATION: Vital signs reviewed per the nursing documentation. BP (!) 156/95   Pulse 86   Temp 98.4 °F (36.9 °C)   Ht 5' 1.5\" (1.562 m)   Wt 265 lb 8 oz (120.4 kg)   SpO2 100%   BMI 49.35 kg/m²   Body mass index is 49.35 kg/m². Physical Exam  Vitals signs and nursing note reviewed. Constitutional:       Appearance: She is well-developed. Comments: Overweight patient. HENT:      Head: Normocephalic and atraumatic. Eyes:      General: No scleral icterus. Conjunctiva/sclera: Conjunctivae normal.      Pupils: Pupils are equal, round, and reactive to light. Neck:      Musculoskeletal: Normal range of motion and neck supple. Thyroid: No thyromegaly. Vascular: No hepatojugular reflux or JVD. Trachea: No tracheal deviation. Cardiovascular:      Rate and Rhythm: Normal rate and regular rhythm. Heart sounds: Normal heart sounds.    Pulmonary: Effort: Pulmonary effort is normal. No respiratory distress. Breath sounds: Normal breath sounds. No wheezing or rales. Abdominal:      General: Bowel sounds are normal. There is no distension. Palpations: Abdomen is soft. There is no hepatomegaly or mass. Tenderness: There is no abdominal tenderness. There is no rebound. Hernia: No hernia is present. Comments: Obese abdomen, no peripheral signs of chronic liver disease. Musculoskeletal:         General: No tenderness. Comments: No joint swelling   Lymphadenopathy:      Cervical: No cervical adenopathy. Skin:     General: Skin is warm. Findings: No bruising, ecchymosis, erythema or rash. Neurological:      Mental Status: She is alert and oriented to person, place, and time. Cranial Nerves: No cranial nerve deficit. Psychiatric:         Thought Content: Thought content normal.           LABORATORY DATA: Reviewed  Lab Results   Component Value Date    WBC 6.4 02/05/2021    HGB 11.1 (L) 02/05/2021    HCT 36.9 02/05/2021    MCV 82.9 02/05/2021     02/05/2021     02/05/2021    K 4.4 02/05/2021     02/05/2021    CO2 20 02/05/2021    BUN 11 02/05/2021    CREATININE 0.89 02/05/2021    LABALBU 4.0 02/05/2021    BILITOT 0.23 (L) 02/05/2021    ALKPHOS 73 02/05/2021    AST 28 02/05/2021    ALT 24 02/05/2021         Lab Results   Component Value Date    RBC 4.45 02/05/2021    HGB 11.1 (L) 02/05/2021    MCV 82.9 02/05/2021    MCH 24.9 (L) 02/05/2021    MCHC 30.1 02/05/2021    RDW 17.3 (H) 02/05/2021    MPV 10.6 02/05/2021    BASOPCT 1 02/05/2021    LYMPHSABS 1.83 02/05/2021    MONOSABS 0.52 02/05/2021    NEUTROABS 3.65 02/05/2021    EOSABS 0.31 02/05/2021    BASOSABS 0.03 02/05/2021         DIAGNOSTIC TESTING:     No results found. IMPRESSION: Ms. Randall Mares is a 47 y.o. female with     Assessment:  No diagnosis found.     Plan: Basing on the history, examination, past work-up, I do not think that this patient has biliary colic. She has cholecystectomy done and gallbladder did show focal high-grade dysplasia. This is very concerning. She has symptoms suggestive of IBS. Need to evaluate upper GI issues such as gastritis, esophagitis etc.    Given her age, family history and also gallbladder histology she may need colonoscopy as well. Discussed with the patient regarding possibility of IBS and management. Brochures given. Patient is reassured. Spent 30 minute s providing patient education and counseling. Down the line she may need consultation with oncologist regarding gallbladder histology. Thank you for allowing me to participate in the care of Ms. German Nj. For any further questions please do not hesitate to contact me. Note is dictated utilizing voice recognition software. Unfortunately this leads to occasional typographical errors. Please contact our office if you have any questions. I have reviewed and agree with the MA/LPN ROS.      Cornelia Casarez MD, [facility]  Board Certified in Gastroenterology and [address] [place] [address] Gastroenterology  Office #: [phone]

## 2021-02-22 ENCOUNTER — HOSPITAL ENCOUNTER (OUTPATIENT)
Dept: PREADMISSION TESTING | Age: 41
Discharge: HOME | End: 2021-02-26
Payer: COMMERCIAL

## 2021-02-22 VITALS — HEIGHT: 61 IN | BODY MASS INDEX: 49.09 KG/M2 | WEIGHT: 260 LBS

## 2021-02-22 NOTE — PROGRESS NOTES
Pre-op Instructions For Out-Patient Surgery    Medication Instructions:  · Please stop herbs and any supplements now (includes vitamins and minerals). · Please contact your surgeon and prescribing physician for pre-op instructions for any blood thinners. None     · If you have inhalers/aerosol treatments at home, please use them the morning of your surgery and bring the inhalers with you to the hospital. None     · Please take the following medications the morning of your surgery with a sip of water:    None     Surgery Instructions:  1. After midnight before surgery:  Do not eat or drink anything, including water, mints, gum, and hard candy. You may brush your teeth without swallowing. No smoking, chewing tobacco, or street drugs. 2. Please shower or bathe before surgery. 3. Please do not wear any cologne, lotion, powder, deodorant, jewelry, piercings, perfume, makeup, nail polish, hair accessories, or hair spray on the day of surgery. Wear loose comfortable clothing. 4. Leave your valuables at home. Bring a storage case for any glasses/contacts. 5. An adult who is responsible for you MUST drive you home and should be with you for the first 24 hours after surgery. 6. If having out-patient knee and foot surgeries, please arrange for planned crutches, walker, or wheelchair before arriving to the hospital.    The Day of Surgery:  · Arrive at [address] [place] [address] Surgery Entrance at the time directed by your surgeon and check in at the desk. · If you have a living will or healthcare power of , please bring a copy. · You will be taken to the pre-op holding area where you will be prepared for surgery. A physical assessment will be performed by a nurse practitioner or house officer. Your IV will be started and you will meet your anesthesiologist.    · We are currently limiting visitors to only one designated person in the pre-op holding area.   When you go to surgery, your family will be directed to the surgical waiting room, where the doctor should speak with them after your surgery. · After surgery, you will be taken to the recovery room then when you are awake and stable you will go to the short stay unit for preparation to be discharged. Only your one designated person is allowed to come to short stay for your discharge. · If you use a Bi-PAP or C-PAP machine, please bring it with you and leave it in the car in case it is needed in recovery room.

## 2021-02-24 ENCOUNTER — PATIENT MESSAGE (OUTPATIENT)
Dept: GASTROENTEROLOGY | Age: 41
End: 2021-02-24

## 2021-02-24 ENCOUNTER — OFFICE VISIT (OUTPATIENT)
Dept: OBGYN CLINIC | Age: 41
End: 2021-02-24
Payer: COMMERCIAL

## 2021-02-24 VITALS
HEART RATE: 90 BPM | DIASTOLIC BLOOD PRESSURE: 95 MMHG | HEIGHT: 61 IN | SYSTOLIC BLOOD PRESSURE: 148 MMHG | WEIGHT: 263.8 LBS | TEMPERATURE: 97.9 F | BODY MASS INDEX: 49.81 KG/M2

## 2021-02-24 DIAGNOSIS — D25.9 UTERINE LEIOMYOMA, UNSPECIFIED LOCATION: Primary | ICD-10-CM

## 2021-02-24 DIAGNOSIS — R10.2 PELVIC PAIN IN FEMALE: ICD-10-CM

## 2021-02-24 DIAGNOSIS — R03.0 ELEVATED BLOOD PRESSURE READING: ICD-10-CM

## 2021-02-24 DIAGNOSIS — N92.1 MENORRHAGIA WITH IRREGULAR CYCLE: ICD-10-CM

## 2021-02-24 PROCEDURE — G8417 CALC BMI ABV UP PARAM F/U: HCPCS | Performed by: CLINICAL NURSE SPECIALIST

## 2021-02-24 PROCEDURE — G8427 DOCREV CUR MEDS BY ELIG CLIN: HCPCS | Performed by: CLINICAL NURSE SPECIALIST

## 2021-02-24 PROCEDURE — 1036F TOBACCO NON-USER: CPT | Performed by: CLINICAL NURSE SPECIALIST

## 2021-02-24 PROCEDURE — 99203 OFFICE O/P NEW LOW 30 MIN: CPT | Performed by: CLINICAL NURSE SPECIALIST

## 2021-02-24 PROCEDURE — G8484 FLU IMMUNIZE NO ADMIN: HCPCS | Performed by: CLINICAL NURSE SPECIALIST

## 2021-02-24 ASSESSMENT — ENCOUNTER SYMPTOMS
GASTROINTESTINAL NEGATIVE: 1
ALLERGIC/IMMUNOLOGIC NEGATIVE: 1
EYES NEGATIVE: 1
RESPIRATORY NEGATIVE: 1

## 2021-02-24 NOTE — PROGRESS NOTES
Subjective:      Patient ID:  Reyna Forrester is a 47 y.o. female who presents for   Chief Complaint   Patient presents with    New Patient      fibroid tumor        HPI   New patient is a 48 yo female who presents for uterine fibroid. Patient reports that she was told that she has a uterine fibroid by her general surgeon who suggested a hysterectomy. Patient reports that she has having a menses every 2-3 weeks lasting 5-8 days long, and flow is described as very heavy and very painful. Patient states approx. 1 week before menses she will have lower back pain, lower abdominal pain and takes tylenol and that does not help. Patient states that when she starts her menses she feels like her uterus is going to fall out. Patient states that she has recurrent vulvar cysts and she was prescribed antibiotics and it will go away but they are persistent. Review of Systems   Constitutional: Negative for chills and fever. HENT: Negative. Eyes: Negative. Respiratory: Negative. Cardiovascular: Negative. Gastrointestinal: Negative. Endocrine: Negative. Genitourinary: Positive for menstrual problem (reports having a menses every 2-3 weeks and described as heavy lasting 5-8 days with severe cramping 1 wk before menses starts and thru out the menses) and pelvic pain. Negative for dysuria and vaginal discharge. Musculoskeletal: Negative. Skin: Negative. Allergic/Immunologic: Negative. Neurological: Negative. Hematological: Negative. Psychiatric/Behavioral: Negative. BP (!) 148/95 (Site: Left Lower Arm, Position: Sitting, Cuff Size: Small Adult)   Pulse 90   Temp 97.9 °F (36.6 °C) (Temporal)   Ht 5' 1\" (1.549 m)   Wt 263 lb 12.8 oz (119.7 kg)   LMP 02/19/2021 (Exact Date)   BMI 49.84 kg/m²    Patient's last menstrual period was 02/19/2021 (exact date).     Family History   Problem Relation Age of Onset    Arthritis Mother     Mental Illness Mother     Arthritis Father Past Medical History:   Diagnosis Date    Arthritis     Bursitis     Fibroid tumor     uterine    Fibromyalgia     GERD (gastroesophageal reflux disease)     Migraine     Osteoarthritis     Prolonged emergence from general anesthesia     Renal failure     Stroke (cerebrum) ([facility] [address] 2013    TIA    Tendonitis       Past Surgical History:   Procedure Laterality Date    CHOLECYSTECTOMY, LAPAROSCOPIC N/A 4/23/2020    CHOLECYSTECTOMY LAPAROSCOPIC performed by Stephen Navarro MD at [address] [place], [place] [address]roderick     TUBAL LIGATION      2008    UPPER GASTROINTESTINAL ENDOSCOPY  11/2/2018    EGD BIOPSY [facility] Annamarie Seals DO at [facility]    WISDOM TOOTH EXTRACTION        Social History     Socioeconomic History    Marital status: Single     Spouse name: None    Number of children: None    Years of education: None    Highest education level: None   Occupational History    None   Social Needs    Financial resource strain: Not hard at all   [employer] insecurity     Worry: Never true     Inability: Never true    Transportation needs     Medical: No     Non-medical: No   Tobacco Use    Smoking status: Former Smoker    Smokeless tobacco: Never Used   Substance and Sexual Activity    Alcohol use: Yes     Comment: occassional    Drug use: No    Sexual activity: None   Lifestyle    Physical activity     Days per week: None     Minutes per session: None    Stress: None   Relationships    Social connections     Talks on phone: None     Gets together: None     Attends Denominational service: None     Active member of club or organization: None     Attends meetings of clubs or organizations: None     Relationship status: None    Intimate partner violence     Fear of current or ex partner: None     Emotionally abused: None     Physically abused: None     Forced sexual activity: None   Other Topics Concern    None   Social History Narrative    None      Current Outpatient Medications Medication Sig Dispense Refill    vitamin D (ERGOCALCIFEROL) 1.25 MG (43389 UT) CAPS capsule Take 1 capsule by mouth once a week 4 capsule 5    gabapentin (NEURONTIN) 300 MG capsule       oxyCODONE-acetaminophen (PERCOCET) 5-325 MG per tablet Take 1 tablet by mouth every 4 hours as needed for Pain.  pantoprazole (PROTONIX) 20 MG tablet Take 1 tablet by mouth every morning (before breakfast) 30 tablet 5    Probiotic Acidophilus (FLORANEX) TABS Take 1 tablet by mouth daily 30 tablet 5     No current facility-administered medications for this visit. Objective:   Physical Exam  Vitals signs reviewed. Constitutional:       Appearance: She is well-developed. HENT:      Head: Normocephalic and atraumatic. Eyes:      Conjunctiva/sclera: Conjunctivae normal.   Neck:      Musculoskeletal: Normal range of motion and neck supple. Cardiovascular:      Rate and Rhythm: Normal rate and regular rhythm. Pulmonary:      Effort: Pulmonary effort is normal.      Breath sounds: Normal breath sounds. Abdominal:      General: Bowel sounds are normal.   Genitourinary:     Vagina: No vaginal discharge. Musculoskeletal: Normal range of motion. Skin:     General: Skin is warm and dry. Neurological:      Mental Status: She is oriented to person, place, and time. Psychiatric:         Behavior: Behavior normal.         Thought Content: Thought content normal.         Judgment: Judgment normal.         Assessment:      Diagnosis Orders   1. Uterine leiomyoma, unspecified location  US NON OB TRANSVAGINAL   2. Menorrhagia with irregular cycle     3. Pelvic pain in female     4. Elevated blood pressure reading             Plan:    Discussed with patient her elevated BP and patient states she is seeing PCP for her elevated BP  Patient is demanding HYST and would like it to be completed within the next 6 months.      Return in about 2 weeks (around 3/10/2021) for [facility] Dr. Erin Davenport for [address] results and to discuss hyst.

## 2021-02-24 NOTE — TELEPHONE ENCOUNTER
From: Jorge Duran  To: Jerome Victoria MD  Sent: 2/24/2021 11:29 AM EST  Subject: Prescription Question    Hello Im messaging you because I need my Suprep Prescription changed for my procedure. I contacted the pharmacy and they informed that they sent a pre authorization to the doctors office after my first visit but still havent been approved my insurance and I need the prescription before my procedure.  Can someone please contact me back about this  Thank you

## 2021-02-26 NOTE — TELEPHONE ENCOUNTER
Patient called office stating that she needs a prior auth for her prep and or requesting something different. Informed patient that we did have a sample if she would like to pick that up. Stated that she was not far from the office and will pick it up. Writer thanked and call ended.

## 2021-03-01 ENCOUNTER — HOSPITAL ENCOUNTER (OUTPATIENT)
Dept: LAB | Age: 41
Setting detail: SPECIMEN
Discharge: HOME | End: 2021-03-01
Payer: COMMERCIAL

## 2021-03-01 DIAGNOSIS — Z01.818 PREOP TESTING: Primary | ICD-10-CM

## 2021-03-01 PROCEDURE — U0003 INFECTIOUS AGENT DETECTION BY NUCLEIC ACID (DNA OR RNA); SEVERE ACUTE RESPIRATORY SYNDROME CORONAVIRUS 2 (SARS-COV-2) (CORONAVIRUS DISEASE [COVID-19]), AMPLIFIED PROBE TECHNIQUE, MAKING USE OF HIGH THROUGHPUT TECHNOLOGIES AS DESCRIBED BY CMS-2020-01-R: HCPCS

## 2021-03-01 PROCEDURE — U0005 INFEC AGEN DETEC AMPLI PROBE: HCPCS

## 2021-03-02 LAB
SARS-COV-2: NORMAL
SARS-COV-2: NOT DETECTED
SOURCE: NORMAL

## 2021-03-04 ENCOUNTER — ANESTHESIA EVENT (OUTPATIENT)
Dept: ENDOSCOPY | Age: 41
End: 2021-03-04
Payer: COMMERCIAL

## 2021-03-05 ENCOUNTER — ANESTHESIA (OUTPATIENT)
Dept: ENDOSCOPY | Age: 41
End: 2021-03-05
Payer: COMMERCIAL

## 2021-03-05 ENCOUNTER — HOSPITAL ENCOUNTER (OUTPATIENT)
Age: 41
Setting detail: OUTPATIENT SURGERY
Discharge: HOME | End: 2021-03-05
Attending: INTERNAL MEDICINE | Admitting: INTERNAL MEDICINE
Payer: COMMERCIAL

## 2021-03-05 VITALS
HEIGHT: 61 IN | HEART RATE: 70 BPM | RESPIRATION RATE: 23 BRPM | OXYGEN SATURATION: 100 % | BODY MASS INDEX: 49.09 KG/M2 | SYSTOLIC BLOOD PRESSURE: 131 MMHG | TEMPERATURE: 98 F | DIASTOLIC BLOOD PRESSURE: 76 MMHG | WEIGHT: 260 LBS

## 2021-03-05 VITALS — DIASTOLIC BLOOD PRESSURE: 93 MMHG | OXYGEN SATURATION: 100 % | SYSTOLIC BLOOD PRESSURE: 156 MMHG

## 2021-03-05 LAB
-: NORMAL
HCG, PREGNANCY URINE (POC): NEGATIVE

## 2021-03-05 PROCEDURE — 2500000003 HC RX 250 WO HCPCS: Performed by: NURSE ANESTHETIST, CERTIFIED REGISTERED

## 2021-03-05 PROCEDURE — 2580000003 HC RX 258: Performed by: ANESTHESIOLOGY

## 2021-03-05 PROCEDURE — 3609012400 HC EGD TRANSORAL BIOPSY SINGLE/MULTIPLE: Performed by: INTERNAL MEDICINE

## 2021-03-05 PROCEDURE — 3700000000 HC ANESTHESIA ATTENDED CARE: Performed by: INTERNAL MEDICINE

## 2021-03-05 PROCEDURE — 3609010300 HC COLONOSCOPY W/BIOPSY SINGLE/MULTIPLE: Performed by: INTERNAL MEDICINE

## 2021-03-05 PROCEDURE — 7100000000 HC PACU RECOVERY - FIRST 15 MIN: Performed by: INTERNAL MEDICINE

## 2021-03-05 PROCEDURE — 88305 TISSUE EXAM BY PATHOLOGIST: CPT

## 2021-03-05 PROCEDURE — 43239 EGD BIOPSY SINGLE/MULTIPLE: CPT | Performed by: INTERNAL MEDICINE

## 2021-03-05 PROCEDURE — 3700000001 HC ADD 15 MINUTES (ANESTHESIA): Performed by: INTERNAL MEDICINE

## 2021-03-05 PROCEDURE — 2709999900 HC NON-CHARGEABLE SUPPLY: Performed by: INTERNAL MEDICINE

## 2021-03-05 PROCEDURE — 81025 URINE PREGNANCY TEST: CPT

## 2021-03-05 PROCEDURE — 6360000002 HC RX W HCPCS: Performed by: NURSE ANESTHETIST, CERTIFIED REGISTERED

## 2021-03-05 PROCEDURE — 7100000001 HC PACU RECOVERY - ADDTL 15 MIN: Performed by: INTERNAL MEDICINE

## 2021-03-05 PROCEDURE — 45380 COLONOSCOPY AND BIOPSY: CPT | Performed by: INTERNAL MEDICINE

## 2021-03-05 RX ORDER — SODIUM CHLORIDE, SODIUM LACTATE, POTASSIUM CHLORIDE, CALCIUM CHLORIDE 600; 310; 30; 20 MG/100ML; MG/100ML; MG/100ML; MG/100ML
INJECTION, SOLUTION INTRAVENOUS CONTINUOUS
Status: DISCONTINUED | OUTPATIENT
Start: 2021-03-05 | End: 2021-03-05 | Stop reason: HOSPADM

## 2021-03-05 RX ORDER — LIDOCAINE HYDROCHLORIDE 10 MG/ML
INJECTION, SOLUTION EPIDURAL; INFILTRATION; INTRACAUDAL; PERINEURAL PRN
Status: DISCONTINUED | OUTPATIENT
Start: 2021-03-05 | End: 2021-03-05 | Stop reason: SDUPTHER

## 2021-03-05 RX ORDER — ONDANSETRON 2 MG/ML
4 INJECTION INTRAMUSCULAR; INTRAVENOUS
Status: DISCONTINUED | OUTPATIENT
Start: 2021-03-05 | End: 2021-03-05 | Stop reason: HOSPADM

## 2021-03-05 RX ORDER — LABETALOL HYDROCHLORIDE 5 MG/ML
5 INJECTION, SOLUTION INTRAVENOUS EVERY 10 MIN PRN
Status: DISCONTINUED | OUTPATIENT
Start: 2021-03-05 | End: 2021-03-05 | Stop reason: HOSPADM

## 2021-03-05 RX ORDER — LIDOCAINE HYDROCHLORIDE 10 MG/ML
1 INJECTION, SOLUTION EPIDURAL; INFILTRATION; INTRACAUDAL; PERINEURAL
Status: DISCONTINUED | OUTPATIENT
Start: 2021-03-05 | End: 2021-03-05 | Stop reason: HOSPADM

## 2021-03-05 RX ORDER — PROPOFOL 10 MG/ML
INJECTION, EMULSION INTRAVENOUS PRN
Status: DISCONTINUED | OUTPATIENT
Start: 2021-03-05 | End: 2021-03-05 | Stop reason: SDUPTHER

## 2021-03-05 RX ORDER — SODIUM CHLORIDE 0.9 % (FLUSH) 0.9 %
10 SYRINGE (ML) INJECTION PRN
Status: DISCONTINUED | OUTPATIENT
Start: 2021-03-05 | End: 2021-03-05 | Stop reason: HOSPADM

## 2021-03-05 RX ORDER — MEPERIDINE HYDROCHLORIDE 25 MG/ML
12.5 INJECTION INTRAMUSCULAR; INTRAVENOUS; SUBCUTANEOUS EVERY 5 MIN PRN
Status: DISCONTINUED | OUTPATIENT
Start: 2021-03-05 | End: 2021-03-05 | Stop reason: HOSPADM

## 2021-03-05 RX ORDER — DIPHENHYDRAMINE HYDROCHLORIDE 50 MG/ML
12.5 INJECTION INTRAMUSCULAR; INTRAVENOUS
Status: DISCONTINUED | OUTPATIENT
Start: 2021-03-05 | End: 2021-03-05 | Stop reason: HOSPADM

## 2021-03-05 RX ORDER — MORPHINE SULFATE 2 MG/ML
2 INJECTION, SOLUTION INTRAMUSCULAR; INTRAVENOUS EVERY 5 MIN PRN
Status: DISCONTINUED | OUTPATIENT
Start: 2021-03-05 | End: 2021-03-05 | Stop reason: HOSPADM

## 2021-03-05 RX ADMIN — SODIUM CHLORIDE, POTASSIUM CHLORIDE, SODIUM LACTATE AND CALCIUM CHLORIDE: 600; 310; 30; 20 INJECTION, SOLUTION INTRAVENOUS at 07:41

## 2021-03-05 RX ADMIN — PROPOFOL 150 MG: 10 INJECTION, EMULSION INTRAVENOUS at 08:37

## 2021-03-05 RX ADMIN — LIDOCAINE HYDROCHLORIDE 50 MG: 10 INJECTION, SOLUTION EPIDURAL; INFILTRATION; INTRACAUDAL; PERINEURAL at 08:37

## 2021-03-05 RX ADMIN — PROPOFOL 140 MG: 10 INJECTION, EMULSION INTRAVENOUS at 08:43

## 2021-03-05 ASSESSMENT — PULMONARY FUNCTION TESTS
PIF_VALUE: 1
PIF_VALUE: 0
PIF_VALUE: 0
PIF_VALUE: 1

## 2021-03-05 ASSESSMENT — LIFESTYLE VARIABLES: SMOKING_STATUS: 0

## 2021-03-05 ASSESSMENT — ENCOUNTER SYMPTOMS
SHORTNESS OF BREATH: 0
STRIDOR: 0

## 2021-03-05 NOTE — ANESTHESIA POSTPROCEDURE EVALUATION
POST- ANESTHESIA EVALUATION       Pt Name: Marionette Homans  MRN: [id number]  YOB: 1980  Date of evaluation: 3/5/2021  Time:  11:58 AM      /76   Pulse 70   Temp 98 °F (36.7 °C)   Resp 23   Ht 5' 1\" (1.549 m)   Wt 260 lb (117.9 kg)   LMP 02/19/2021 (Exact Date)   SpO2 100%   BMI 49.13 kg/m²      Consciousness Level  Awake  Cardiopulmonary Status  Stable  Pain Adequately Treated YES  Nausea / Vomiting  NO  Adequate Hydration  YES  Anesthesia Related Complications NONE      Electronically signed by Nasreen Dos Santos MD on 3/5/2021 at 11:58 AM       Department of Anesthesiology  Postprocedure Note    Patient: Marionette Homans  MRN: [id number]  YOB: 1980  Date of evaluation: 3/5/2021  Time:  11:58 AM     Procedure Summary     Date: 03/05/21 Room / Location: [place] [address] 02 / [address] [facility]    Anesthesia Start: 8105 Anesthesia Stop: 9089    Procedures:       EGD BIOPSY (N/A Esophagus)      COLONOSCOPY WITH BIOPSY (N/A Anus) Diagnosis: (CHRONIC GERD BOWEL HABIT CHANGES)    Surgeons: [address] [place] [address] Street, MD Responsible Provider: Nasreen Dos Santos MD    Anesthesia Type: MAC, general ASA Status: 2          Anesthesia Type: MAC, general    Goyo Phase I: Goyo Score: 10    Goyo Phase II:      Last vitals: Reviewed and per EMR flowsheets.        Anesthesia Post Evaluation

## 2021-03-05 NOTE — H&P
parenchyma.  The liver, gallbladder, spleen, pancreas, adrenal glands,  kidneys, are unremarkable in appearance.   GI/Bowel: Appendix is visualized and is normal.  The stomach is unremarkable  without wall thickening or distention.  Bowel loops are unremarkable in  appearance without evidence of obstruction, distension or mucosal thickening.   Pelvis: The urinary bladder is mildly distended but grossly unremarkable in  appearance.  No evidence of pelvic free fluid is seen.  Heterogeneous large  right fundal uterine mass lesion is again identified which measures up to 6.5  cm in diameter.  The uterus is anteverted in position and otherwise  unremarkable in appearance.  Bilateral Essure coils are seen within the  fallopian tubes.   Peritoneum/Retroperitoneum: No evidence of retroperitoneal or intraperitoneal  lymphadenopathy is identified.  No evidence of intraperitoneal free fluid is  seen.   Bones/Soft Tissues: The bones, skeletal muscle bundles, fascial planes and  subcutaneous soft tissues are unremarkable in appearance.     Impression  1. Redemonstration of large right fundal uterine mass lesion, as previously  described.  Recommend clinical correlation. 2. Suggestion bilateral renal medullary nephrocalcinosis. 3. Inferior left renal small simple cyst.  4. Otherwise, unremarkable contrast enhanced CT abdomen and pelvis  examination.       Lab Results  Component Value Date   WBC 6.4 02/05/2021   HGB 11.1 (L) 02/05/2021   HCT 36.9 02/05/2021   MCV 82.9 02/05/2021    02/05/2021  Lab Results  Component Value Date    02/05/2021   K 4.4 02/05/2021    02/05/2021   CO2 20 02/05/2021   BUN 11 02/05/2021   CREATININE 0.89 02/05/2021   GLUCOSE 74 02/05/2021   CALCIUM 10.0 02/05/2021         PAST MEDICAL HISTORY     Past Medical History:   Diagnosis Date    Arthritis     Bursitis     Fibroid tumor     uterine    Fibromyalgia     GERD (gastroesophageal reflux disease)     Migraine  Osteoarthritis     Prolonged emergence from general anesthesia     Renal failure     Stroke (cerebrum) ([facility] [address] 2013    TIA    Tendonitis        SURGICAL HISTORY       Past Surgical History:   Procedure Laterality Date    CHOLECYSTECTOMY, LAPAROSCOPIC N/A 4/23/2020    CHOLECYSTECTOMY LAPAROSCOPIC performed by Sb Carmona MD at [address] [place] [address]      [email or web address]     TUBAL LIGATION      2008    UPPER GASTROINTESTINAL ENDOSCOPY  11/2/2018    EGD BIOPSY performed by Catracho Garcia DO at [facility]    WISDOM TOOTH EXTRACTION         FAMILY HISTORY       Family History   Problem Relation Age of Onset    Arthritis Mother     Mental Illness Mother     Arthritis Father        SOCIAL HISTORY       Social History     Socioeconomic History    Marital status: Single     Spouse name: Not on file    Number of children: Not on file    Years of education: Not on file    Highest education level: Not on file   Occupational History    Not on file   Social Needs    Financial resource strain: Not hard at all   [employer] insecurity     Worry: Never true     Inability: Never true   [employer] needs     Medical: No     Non-medical: No   Tobacco Use    Smoking status: Former Smoker    Smokeless tobacco: Never Used   Substance and Sexual Activity    Alcohol use: Yes     Comment: occassional    Drug use: No    Sexual activity: Not on file   Lifestyle    Physical activity     Days per week: Not on file     Minutes per session: Not on file    Stress: Not on file   Relationships    Social connections     Talks on phone: Not on file     Gets together: Not on file     Attends Catholic service: Not on file     Active member of club or organization: Not on file     Attends meetings of clubs or organizations: Not on file     Relationship status: Not on file    Intimate partner violence     Fear of current or ex partner: Not on file     Emotionally abused: Not on file     Physically abused: Not on file Forced sexual activity: Not on file   Other Topics Concern    Not on file   Social History Narrative    Not on file           REVIEW OF SYSTEMS      Allergies   Allergen Reactions    Latex Itching    Vicodin [Hydrocodone-Acetaminophen] Hives    Bactrim [Sulfamethoxazole-Trimethoprim]     Codeine     Ibuprofen     Sulfa Antibiotics        No current facility-administered medications on file prior to encounter. Current Outpatient Medications on File Prior to Encounter   Medication Sig Dispense Refill    vitamin D (ERGOCALCIFEROL) 1.25 MG (72446 UT) CAPS capsule Take 1 capsule by mouth once a week 4 capsule 5    gabapentin (NEURONTIN) 300 MG capsule       oxyCODONE-acetaminophen (PERCOCET) 5-325 MG per tablet Take 1 tablet by mouth every 4 hours as needed for Pain.  pantoprazole (PROTONIX) 20 MG tablet Take 1 tablet by mouth every morning (before breakfast) 30 tablet 5    Probiotic Acidophilus (FLORANEX) TABS Take 1 tablet by mouth daily 30 tablet 5       Negative except for what is mentioned in the HPI. GENERAL PHYSICAL EXAM     Vitals: see nursing flow sheet for vital signs     GENERAL APPEARANCE:   Celeste Triplett is 47 y.o.,  female,nourished, conscious, alert. Does not appear to be distress or pain at this time. SKIN:  Warm, dry, no cyanosis or jaundice. HEAD:  Normocephalic, atraumatic, no swelling or tenderness. EYES:  Pupils equal, reactive to light. EARS:  No discharge, no marked hearing loss. NOSE:  No rhinorrhea, epistaxis or septal deformity. THROAT:  Not congested. No ulceration bleeding or discharge. NECK:  No stiffness, trachea central.  No palpable masses or L.N.                 CHEST:  Symmetrical and equal on expansion. HEART:  RRR S1 > S2. No audible murmurs or gallops. LUNGS:  Equal on expansion, normal breath sounds. No adventitious sounds. ABDOMEN: Soft on palpation. No localized tenderness. No guarding or rigidity. No palpable hepatosplenomegaly. LYMPHATICS:  No palpable cervical lymphadenopathy. LOCOMOTOR, BACK AND SPINE:  No tenderness or deformities. EXTREMITIES:  Symmetrical, no pretibial edema. No calf tenderness, No discoloration or ulcerations. NEUROLOGIC:  The patient is conscious, alert, oriented, No apparent focal sensory or motor deficits.              PROVISIONAL DIAGNOSES / SURGERY:      COLONOSCOPY DIAGNOSTIC  EGD ESOPHAGOGASTRODUODENOSCOPY    Patient Active Problem List    Diagnosis Date Noted    Elevated blood pressure reading 02/24/2021    Menorrhagia with irregular cycle 02/24/2021    Pelvic pain in female 02/24/2021    S/P laparoscopic cholecystectomy 37/53/6451    Biliary colic 23/38/4629    Hiatal hernia with GERD without esophagitis     Primary osteoarthritis of both hips 04/12/2017    Tendonitis involving hip abductors 04/12/2017    DDD (degenerative disc disease), lumbar 05/13/2015    Trochanteric bursitis 05/13/2015    Vitamin D deficiency 05/13/2015    Migraine headache 03/25/2013           ADRYAN Reyes - CNP on 3/5/2021 at 6:46 AM

## 2021-03-05 NOTE — OP NOTE
ESOPHAGOGASTRODUODENOSCOPY   ( EGD )  DATE OF PROCEDURE: 3/5/2021     SURGEON: Estiven Vela MD    ASSISTANT: None    PREOPERATIVE DIAGNOSIS: Patient has epigastric pain, dyspepsia. Procedure performed to evaluate upper GI lesions    POSTOPERATIVE DIAGNOSIS: No lesions seen up to the end of second part of the duodenum that can account for her symptoms    OPERATION: Upper GI endoscopy with Biopsy    ANESTHESIA: MAC    ESTIMATED BLOOD LOSS: None    COMPLICATIONS: None. SPECIMENS:  Was Obtained: Random biopsies from the body and antrum for H. pylori. Also biopsies taken from the esophagus to evaluate microscopic esophagitis    HISTORY: The patient is a 47y.o. year old female with history of above preop diagnosis. I recommended esophagogastroduodenoscopy with possible biopsy and I explained the risk, benefits, expected outcome, and alternatives to the procedure. Risks included but are not limited to bleeding, infection, respiratory distress, hypotension, and perforation of the esophagus, stomach, or duodenum. Patient understands and is in agreement. PROCEDURE: The patient was given IV conscious sedation. The patient's SPO2 remained above 90% throughout the procedure. Cetacaine spray given. Patient placed in left lateral position. Olympus  videogastroscope was inserted orally under vision into the esophagus without difficulty and advanced into the stomach then through the pylorus up to the second part of duodenum. Findings:    Retropharyngeal area was grossly normal appearing    Esophagus: normal.  No signs of peptic esophagitis or Thomas's mucosa seen. No hiatal hernia. Squamocolumnar junction is at about 35 cm and lower esophageal sphincter opens normally.   Random biopsies taken from the body of the esophagus to evaluate microscopic disease  Stomach:fundus and Cardia Examined in Retroflexed View: normal    Body: normal    Antrum: normal Random biopsies taken from the body of the stomach and antrum to evaluate H. pylori  Duodenum:     Descending: normal    Bulb: normal    While withdrawing the scope the above findings were verified and the scope was removed. The patient has tolerated the procedure without unusual events. Recommendations/Plan:   1. F/U Biopsies  2. F/U In Office as instructed  3.  Discussed with the family                   Electronically signed by Chasity Ho MD  on 3/5/2021 at 8:42 AM

## 2021-03-05 NOTE — ANESTHESIA PRE PROCEDURE
Department of Anesthesiology  Preprocedure Note       Name:  Matheus Wilson   Age:  47 y.o.  :  1980                                          MRN:  [id number]         Date:  3/5/2021      Surgeon: Marina Rich):  Chayo Wise MD    Procedure: Procedure(s):  EGD ESOPHAGOGASTRODUODENOSCOPY  COLONOSCOPY DIAGNOSTIC    Medications prior to admission:   Prior to Admission medications    Medication Sig Start Date End Date Taking? Authorizing Provider   vitamin D (ERGOCALCIFEROL) 1.25 MG (20814 UT) CAPS capsule Take 1 capsule by mouth once a week 2/10/21   Liseth Billy PA-C   gabapentin (NEURONTIN) 300 MG capsule  21   Historical Provider, MD   oxyCODONE-acetaminophen (PERCOCET) 5-325 MG per tablet Take 1 tablet by mouth every 4 hours as needed for Pain. Historical Provider, MD   pantoprazole (PROTONIX) 20 MG tablet Take 1 tablet by mouth every morning (before breakfast) 21   Liseth Billy PA-C   Probiotic Acidophilus [facility]) TABS Take 1 tablet by mouth daily 2/5/21 3/7/21  Liseth Billy PA-C       Current medications:    Current Facility-Administered Medications   Medication Dose Route Frequency Provider Last Rate Last Admin    lactated ringers infusion   Intravenous Continuous [place] MD Darius        sodium chloride flush 0.9 % injection 10 mL  10 mL Intravenous PRN Aydee Mccoy MD        lidocaine PF 1 % injection 1 mL  1 mL Intradermal Once PRN Tory Pena MD           Allergies:     Allergies   Allergen Reactions    Latex Itching    Vicodin [Hydrocodone-Acetaminophen] Hives    Bactrim [Sulfamethoxazole-Trimethoprim]     Codeine     Ibuprofen     Sulfa Antibiotics        Problem List:    Patient Active Problem List   Diagnosis Code    Hiatal hernia with GERD without esophagitis K44.9, K21.9    S/P laparoscopic cholecystectomy G34.49    Biliary colic O21.47    DDD (degenerative disc disease), lumbar M51.36    Migraine headache G43.909  Primary osteoarthritis of both hips M16.0    Tendonitis involving hip abductors M76.899    Trochanteric bursitis M70.60    Vitamin D deficiency E55.9    Elevated blood pressure reading R03.0    Menorrhagia with irregular cycle N92.1    Pelvic pain in female R10.2       Past Medical History:        Diagnosis Date    Arthritis     Bursitis     Fibroid tumor     uterine    Fibromyalgia     GERD (gastroesophageal reflux disease)     Migraine     Osteoarthritis     Prolonged emergence from general anesthesia     Renal failure     Stroke (cerebrum) ([address] 2013    TIA    Tendonitis        Past Surgical History:        Procedure Laterality Date    CHOLECYSTECTOMY, LAPAROSCOPIC N/A 4/23/2020    CHOLECYSTECTOMY LAPAROSCOPIC performed by Kyle Cervantes MD at [address] [place] [address]      [facility]     TUBAL LIGATION      2008    UPPER GASTROINTESTINAL ENDOSCOPY  11/2/2018    EGD BIOPSY performed by Salbador Gutierrez DO at [facility]    WISDOM TOOTH EXTRACTION         Social History:    Social History     Tobacco Use    Smoking status: Former Smoker    Smokeless tobacco: Never Used   Substance Use Topics    Alcohol use: Yes     Comment: occassional                                Counseling given: Not Answered      Vital Signs (Current): There were no vitals filed for this visit.                                            BP Readings from Last 3 Encounters:   02/24/21 (!) 148/95   02/11/21 (!) 166/95   02/05/21 122/86       NPO Status:                                                                                 BMI:   Wt Readings from Last 3 Encounters:   02/22/21 260 lb (117.9 kg)   02/24/21 263 lb 12.8 oz (119.7 kg)   02/11/21 265 lb 8 oz (120.4 kg)     There is no height or weight on file to calculate BMI.    CBC:   Lab Results   Component Value Date    WBC 6.4 02/05/2021    RBC 4.45 02/05/2021    HGB 11.1 02/05/2021    HCT 36.9 02/05/2021    MCV 82.9 02/05/2021    RDW 17.3 02/05/2021  02/05/2021       CMP:   Lab Results   Component Value Date     02/05/2021    K 4.4 02/05/2021     02/05/2021    CO2 20 02/05/2021    BUN 11 02/05/2021    CREATININE 0.89 02/05/2021    GFRAA >60 02/05/2021    LABGLOM >60 02/05/2021    GLUCOSE 74 02/05/2021    PROT 8.1 02/05/2021    CALCIUM 10.0 02/05/2021    BILITOT 0.23 02/05/2021    ALKPHOS 73 02/05/2021    AST 28 02/05/2021    ALT 24 02/05/2021       POC Tests: No results for input(s): POCGLU, POCNA, POCK, POCCL, POCBUN, POCHEMO, POCHCT in the last 72 hours. Coags: No results found for: PROTIME, INR, APTT    HCG (If Applicable):   Lab Results   Component Value Date    HCG NEGATIVE 04/23/2020        ABGs: No results found for: PHART, PO2ART, BAM0RXM, YLR1LFY, BEART, L8LSXSYN     Type & Screen (If Applicable):  No results found for: LABABO, LABRH    Drug/Infectious Status (If Applicable):  Lab Results   Component Value Date    HEPCAB NONREACTIVE 02/05/2021       COVID-19 Screening (If Applicable):   Lab Results   Component Value Date    COVID19 Not Detected 03/01/2021         Anesthesia Evaluation  Patient summary reviewed and Nursing notes reviewed no history of anesthetic complications:   Airway: Mallampati: II  TM distance: >3 FB   Neck ROM: full  Mouth opening: > = 3 FB Dental: normal exam         Pulmonary:Negative Pulmonary ROS and normal exam  breath sounds clear to auscultation      (-) pneumonia, COPD, asthma, shortness of breath, recent URI, sleep apnea, rhonchi, wheezes, rales, stridor and not a current smoker          Patient did not smoke on day of surgery.                  Cardiovascular:  Exercise tolerance: good (>4 METS),       (-) pacemaker, hypertension, valvular problems/murmurs, past MI, CAD, CABG/stent, dysrhythmias,  angina,  CHF, orthopnea, PND,  FRASER, murmur, weak pulses,  friction rub, systolic click, carotid bruit,  JVD and peripheral edema    ECG reviewed  Rhythm: regular  Rate: normal Beta Blocker:  Not on Beta Blocker         Neuro/Psych:   (+) CVA: no interval change, neuromuscular disease:, headaches:,    (-) seizures, TIA, psychiatric history and depression/anxiety            GI/Hepatic/Renal:   (+) GERD: no interval change,      (-) hiatal hernia, PUD, hepatitis, liver disease, no renal disease, bowel prep and no morbid obesity       Endo/Other: Negative Endo/Other ROS   (+) no malignancy/cancer. (-) diabetes mellitus, hypothyroidism, hyperthyroidism, blood dyscrasia, arthritis, no electrolyte abnormalities, no malignancy/cancer               Abdominal:           Vascular: negative vascular ROS. - PVD, DVT and PE. Anesthesia Plan      MAC and general     ASA 2       Induction: intravenous. MIPS: Postoperative opioids intended and Prophylactic antiemetics administered. Anesthetic plan and risks discussed with patient. Plan discussed with CRNA. The patient was counseled at length about the risks of everardo Covid-19 during their perioperative period and any recovery window from their procedure. The patient was made aware that everardo Covid-19  may worsen their prognosis for recovering from their procedure  and lend to a higher morbidity and/or mortality risk. All material risks, benefits, and reasonable alternatives including postponing the procedure were discussed. The patient DOES wish to proceed with the procedure at this time.           Ronald Guadarrama MD   3/5/2021

## 2021-03-08 LAB — SURGICAL PATHOLOGY REPORT: NORMAL

## 2021-03-09 ENCOUNTER — HOSPITAL ENCOUNTER (OUTPATIENT)
Age: 41
Setting detail: SPECIMEN
Discharge: HOME | End: 2021-03-09
Payer: COMMERCIAL

## 2021-03-09 DIAGNOSIS — R19.4 BOWEL HABIT CHANGES: ICD-10-CM

## 2021-03-09 LAB
ABSOLUTE EOS #: 0.31 K/UL (ref 0–0.44)
ABSOLUTE IMMATURE GRANULOCYTE: <0.03 K/UL (ref 0–0.3)
ABSOLUTE LYMPH #: 2.5 K/UL (ref 1.1–3.7)
ABSOLUTE MONO #: 0.53 K/UL (ref 0.1–1.2)
ALBUMIN SERPL-MCNC: 4.1 G/DL (ref 3.5–5.2)
ALBUMIN/GLOBULIN RATIO: 1.1 (ref 1–2.5)
ALP BLD-CCNC: 70 U/L (ref 35–104)
ALT SERPL-CCNC: 32 U/L (ref 5–33)
ANION GAP SERPL CALCULATED.3IONS-SCNC: 12 MMOL/L (ref 9–17)
AST SERPL-CCNC: 28 U/L
BASOPHILS # BLD: 0 % (ref 0–2)
BASOPHILS ABSOLUTE: <0.03 K/UL (ref 0–0.2)
BILIRUB SERPL-MCNC: 0.21 MG/DL (ref 0.3–1.2)
BUN BLDV-MCNC: 10 MG/DL (ref 6–20)
BUN/CREAT BLD: ABNORMAL (ref 9–20)
CALCIUM SERPL-MCNC: 9.4 MG/DL (ref 8.6–10.4)
CHLORIDE BLD-SCNC: 105 MMOL/L (ref 98–107)
CO2: 23 MMOL/L (ref 20–31)
CREAT SERPL-MCNC: 0.89 MG/DL (ref 0.5–0.9)
DIFFERENTIAL TYPE: ABNORMAL
EOSINOPHILS RELATIVE PERCENT: 5 % (ref 1–4)
GFR AFRICAN AMERICAN: >60 ML/MIN
GFR NON-AFRICAN AMERICAN: >60 ML/MIN
GFR SERPL CREATININE-BSD FRML MDRD: ABNORMAL ML/MIN/{1.73_M2}
GFR SERPL CREATININE-BSD FRML MDRD: ABNORMAL ML/MIN/{1.73_M2}
GLUCOSE BLD-MCNC: 92 MG/DL (ref 70–99)
HCT VFR BLD CALC: 36.4 % (ref 36.3–47.1)
HEMOGLOBIN: 11.2 G/DL (ref 11.9–15.1)
IMMATURE GRANULOCYTES: 0 %
LIPASE: 22 U/L (ref 13–60)
LYMPHOCYTES # BLD: 37 % (ref 24–43)
MCH RBC QN AUTO: 24.9 PG (ref 25.2–33.5)
MCHC RBC AUTO-ENTMCNC: 30.8 G/DL (ref 28.4–34.8)
MCV RBC AUTO: 80.9 FL (ref 82.6–102.9)
MONOCYTES # BLD: 8 % (ref 3–12)
NRBC AUTOMATED: 0 PER 100 WBC
PDW BLD-RTO: 16.7 % (ref 11.8–14.4)
PLATELET # BLD: 397 K/UL (ref 138–453)
PLATELET ESTIMATE: ABNORMAL
PMV BLD AUTO: 10.6 FL (ref 8.1–13.5)
POTASSIUM SERPL-SCNC: 4.3 MMOL/L (ref 3.7–5.3)
RBC # BLD: 4.5 M/UL (ref 3.95–5.11)
RBC # BLD: ABNORMAL 10*6/UL
SEG NEUTROPHILS: 50 % (ref 36–65)
SEGMENTED NEUTROPHILS ABSOLUTE COUNT: 3.33 K/UL (ref 1.5–8.1)
SODIUM BLD-SCNC: 140 MMOL/L (ref 135–144)
TOTAL PROTEIN: 7.9 G/DL (ref 6.4–8.3)
WBC # BLD: 6.7 K/UL (ref 3.5–11.3)
WBC # BLD: ABNORMAL 10*3/UL

## 2021-03-12 ENCOUNTER — OFFICE VISIT (OUTPATIENT)
Dept: OBGYN CLINIC | Age: 41
End: 2021-03-12
Payer: COMMERCIAL

## 2021-03-12 VITALS
SYSTOLIC BLOOD PRESSURE: 128 MMHG | HEART RATE: 82 BPM | BODY MASS INDEX: 49.47 KG/M2 | HEIGHT: 61 IN | WEIGHT: 262 LBS | DIASTOLIC BLOOD PRESSURE: 87 MMHG

## 2021-03-12 DIAGNOSIS — D25.0 INTRAMURAL AND SUBMUCOUS LEIOMYOMA OF UTERUS: Primary | ICD-10-CM

## 2021-03-12 DIAGNOSIS — D25.1 INTRAMURAL AND SUBMUCOUS LEIOMYOMA OF UTERUS: Primary | ICD-10-CM

## 2021-03-12 DIAGNOSIS — R93.89 THICKENED ENDOMETRIUM: ICD-10-CM

## 2021-03-12 PROCEDURE — G8427 DOCREV CUR MEDS BY ELIG CLIN: HCPCS | Performed by: SPECIALIST

## 2021-03-12 PROCEDURE — G8484 FLU IMMUNIZE NO ADMIN: HCPCS | Performed by: SPECIALIST

## 2021-03-12 PROCEDURE — G8417 CALC BMI ABV UP PARAM F/U: HCPCS | Performed by: SPECIALIST

## 2021-03-12 PROCEDURE — 1036F TOBACCO NON-USER: CPT | Performed by: SPECIALIST

## 2021-03-12 PROCEDURE — 99213 OFFICE O/P EST LOW 20 MIN: CPT | Performed by: SPECIALIST

## 2021-03-13 ASSESSMENT — ENCOUNTER SYMPTOMS
VOMITING: 0
ABDOMINAL DISTENTION: 0
NAUSEA: 0
CONSTIPATION: 0
DIARRHEA: 0
COUGH: 0
APNEA: 0
ABDOMINAL PAIN: 0
EYE PAIN: 0

## 2021-03-13 NOTE — PROGRESS NOTES
Subjective:      Patient ID: Shelia Lima is a 47 y.o. female. Chief Complaint   Patient presents with    Follow-up     US results     /87 (Site: Left Upper Arm, Position: Sitting, Cuff Size: Large Adult)   Pulse 82   Ht 5' 1\" (1.549 m)   Wt 262 lb (118.8 kg)   LMP 02/19/2021 (Exact Date)   Breastfeeding No   BMI 49.50 kg/m²   Patient's last menstrual period was 02/19/2021 (exact date). No obstetric history on file. Past Medical History:   Diagnosis Date    Arthritis     Bursitis     Fibroid tumor     uterine    Fibromyalgia     GERD (gastroesophageal reflux disease)     Migraine     Osteoarthritis     Prolonged emergence from general anesthesia     Renal failure     Stroke (cerebrum) [address] [address] 2013    TIA    Tendonitis      Current Outpatient Medications Ordered in Epic   Medication Sig Dispense Refill    Probiotic Acidophilus (FLORANEX) TABS Take 1 tablet by mouth daily 30 tablet 5    hydroCHLOROthiazide (HYDRODIURIL) 25 MG tablet Take 1 tablet by mouth every morning 30 tablet 3    metFORMIN (GLUCOPHAGE XR) 500 MG extended release tablet Take 1 tablet by mouth daily (with breakfast) 30 tablet 5    blood glucose monitor strips Test 2 times a day & as needed for symptoms of irregular blood glucose. Dispense sufficient amount for indicated testing frequency plus additional to accommodate PRN testing needs. 120 strip 5    blood glucose monitor kit and supplies 1 kit by Other route 2 times daily 1 kit 0    Lancets MISC 1 each by Does not apply route daily 120 each 5    vitamin D (ERGOCALCIFEROL) 1.25 MG (21739 UT) CAPS capsule Take 1 capsule by mouth once a week 4 capsule 5    gabapentin (NEURONTIN) 300 MG capsule       oxyCODONE-acetaminophen (PERCOCET) 5-325 MG per tablet Take 1 tablet by mouth every 4 hours as needed for Pain.       pantoprazole (PROTONIX) 20 MG tablet Take 1 tablet by mouth every morning (before breakfast) 30 tablet 5     No current Epic-ordered facility-administered medications on file. Problem List Items Addressed This Visit     Intramural and submucous leiomyoma of uterus - Primary        Allergies   Allergen Reactions    Latex Itching    Vicodin [Hydrocodone-Acetaminophen] Hives    Bactrim [Sulfamethoxazole-Trimethoprim]     Codeine     Ibuprofen     Sulfa Antibiotics      No orders of the defined types were placed in this encounter. Patient is here today to discuss the result of her ultrasound that was done for evaluation of a fibroid. She was told that she had a fibroid by her surgeon and that she should get a hysterectomy. Her periods are heavy and painful and last up to 8 days at a time. Her periods also come every 2 to 3 weeks. She does not want anymore children. Review of Systems   Constitutional: Negative for activity change, appetite change and fever. HENT: Negative for ear discharge and ear pain. Eyes: Negative for pain and visual disturbance. Respiratory: Negative for apnea and cough. Cardiovascular: Negative for chest pain, palpitations and leg swelling. Gastrointestinal: Negative for abdominal distention, abdominal pain, constipation, diarrhea, nausea and vomiting. Endocrine: Negative. Genitourinary: Positive for menstrual problem and pelvic pain. Negative for difficulty urinating and dysuria. Musculoskeletal: Negative for neck pain and neck stiffness. Skin: Negative. Neurological: Negative for light-headedness and numbness. Hematological: Negative. Does not bruise/bleed easily. Objective:   Physical Exam  Vitals signs and nursing note reviewed. Constitutional:       Appearance: She is well-developed. HENT:      Head: Normocephalic and atraumatic. Neck:      Musculoskeletal: Normal range of motion and neck supple. Thyroid: No thyromegaly. Cardiovascular:      Rate and Rhythm: Normal rate and regular rhythm.    Pulmonary:      Effort: Pulmonary effort is normal. Breath sounds: Normal breath sounds. No wheezing. Abdominal:      General: Bowel sounds are normal. There is no distension. Palpations: Abdomen is soft. There is no mass. Tenderness: There is no abdominal tenderness. There is no guarding. Musculoskeletal: Normal range of motion. Skin:     General: Skin is dry. Neurological:      Mental Status: She is alert and oriented to person, place, and time. Psychiatric:         Behavior: Behavior normal.         Thought Content: Thought content normal.         Assessment:      Patient with irregular bleeding and pelvic pain. Ultrasound showing fibroid and thickened endometrium was reviewed and discussed with patient. Due to patient's chronic pain and irregular bleeding, she requests hysterectomy but does not want it scheduled until the end of July 2021 due to starting a new job. Will schedule for robotic assisted laparoscopic hysterectomy. Discussed surgical procedure, risks and benefits, and all questions were answered. Patient was told that her ovaries will not be removed. Plan:      Schedule for robotic assisted laparoscopic hysterectomy end of July 2021. Robert Benson am scribing for, and in the presence of Dr. Cori Tillman. Electronically signed by: Rachel Harkins 3/13/21 6:18 AM       I agree to the above documentation placed by my scribe Rachel Harkins. I reviewed the scribe's note and agree with the documented findings and plan of care. Any areas of disagreement are noted on the chart. I have personally evaluated this patient. Additional findings are as noted. I agree with the chief complaint, past medical history, past surgical history, allergies, medications, social and family history as documented unless otherwise noted below.      Electronically signed by Cori Tillman MD on 3/14/2021 at 10:27 AM

## 2021-04-02 ENCOUNTER — TELEPHONE (OUTPATIENT)
Dept: GASTROENTEROLOGY | Age: 41
End: 2021-04-02

## 2021-04-02 NOTE — TELEPHONE ENCOUNTER
Pt called and rs her appt for today to 4/8/21 due to her not feeling well. Pt is requesting a call back from clinical to review her biopsy results     Please call pt back at earliest convenience.

## 2021-10-04 ENCOUNTER — HOSPITAL ENCOUNTER (OUTPATIENT)
Age: 41
Setting detail: SPECIMEN
Discharge: HOME | End: 2021-10-04
Payer: MEDICARE

## 2021-10-04 ENCOUNTER — OFFICE VISIT (OUTPATIENT)
Dept: OBGYN CLINIC | Age: 41
End: 2021-10-04
Payer: MEDICARE

## 2021-10-04 VITALS
HEART RATE: 82 BPM | WEIGHT: 261 LBS | BODY MASS INDEX: 49.28 KG/M2 | HEIGHT: 61 IN | DIASTOLIC BLOOD PRESSURE: 82 MMHG | SYSTOLIC BLOOD PRESSURE: 124 MMHG

## 2021-10-04 DIAGNOSIS — N94.6 DYSMENORRHEA: ICD-10-CM

## 2021-10-04 DIAGNOSIS — N64.4 BREAST PAIN: ICD-10-CM

## 2021-10-04 DIAGNOSIS — N76.0 ACUTE VAGINITIS: ICD-10-CM

## 2021-10-04 DIAGNOSIS — D21.9 FIBROID: ICD-10-CM

## 2021-10-04 DIAGNOSIS — Z12.31 ENCOUNTER FOR SCREENING MAMMOGRAM FOR BREAST CANCER: ICD-10-CM

## 2021-10-04 DIAGNOSIS — Z11.51 SCREENING FOR HUMAN PAPILLOMAVIRUS: ICD-10-CM

## 2021-10-04 DIAGNOSIS — Z01.419 PAP SMEAR, AS PART OF ROUTINE GYNECOLOGICAL EXAMINATION: Primary | ICD-10-CM

## 2021-10-04 DIAGNOSIS — N92.1 MENORRHAGIA WITH IRREGULAR CYCLE: ICD-10-CM

## 2021-10-04 PROCEDURE — G8484 FLU IMMUNIZE NO ADMIN: HCPCS | Performed by: SPECIALIST

## 2021-10-04 PROCEDURE — 99395 PREV VISIT EST AGE 18-39: CPT | Performed by: SPECIALIST

## 2021-10-04 RX ORDER — FLUCONAZOLE 100 MG/1
100 TABLET ORAL DAILY
Qty: 7 TABLET | Refills: 0 | Status: SHIPPED | OUTPATIENT
Start: 2021-10-04 | End: 2021-10-11

## 2021-10-04 RX ORDER — METRONIDAZOLE 500 MG/1
500 TABLET ORAL 2 TIMES DAILY
Qty: 14 TABLET | Refills: 0 | Status: SHIPPED | OUTPATIENT
Start: 2021-10-04 | End: 2021-10-11

## 2021-10-04 ASSESSMENT — ENCOUNTER SYMPTOMS
NAUSEA: 0
ABDOMINAL DISTENTION: 0
VOMITING: 0
COUGH: 0
EYE PAIN: 0
APNEA: 0
DIARRHEA: 0
ABDOMINAL PAIN: 0
CONSTIPATION: 0

## 2021-10-04 NOTE — PROGRESS NOTES
Subjective:      Patient ID: Lakisha Scherer is a 46 y.o. female. Chief Complaint   Patient presents with    Annual Exam     /82 (Site: Left Upper Arm, Position: Sitting, Cuff Size: Medium Adult)   Pulse 82   Ht 5' 1\" (1.549 m)   Wt 261 lb (118.4 kg)   LMP 09/07/2021 (Approximate)   Breastfeeding No   BMI 49.32 kg/m²   Patient's last menstrual period was 09/07/2021 (approximate). [id number]    Past Medical History:   Diagnosis Date    Arthritis     Bursitis     Fibroid tumor     uterine    Fibromyalgia     GERD (gastroesophageal reflux disease)     Migraine     Osteoarthritis     Prolonged emergence from general anesthesia     Renal failure     Stroke (cerebrum) ([facility] [address] 2013    TIA    Tendonitis      Current Outpatient Medications Ordered in Epic   Medication Sig Dispense Refill    metroNIDAZOLE (FLAGYL) 500 MG tablet Take 1 tablet by mouth 2 times daily for 7 days 14 tablet 0    fluconazole (DIFLUCAN) 100 MG tablet Take 1 tablet by mouth daily for 7 days 7 tablet 0    pantoprazole (PROTONIX) 20 MG tablet TAKE 1 TABLET BY MOUTH EVERY MORNING BEFORE BREAKFAST 30 tablet 1    metFORMIN (GLUCOPHAGE-XR) 500 MG extended release tablet TAKE 1 TABLET BY MOUTH DAILY WITH BREAKFAST 30 tablet 5    blood glucose test strips (TRUE METRIX BLOOD GLUCOSE TEST) strip TEST TWICE DAILY AND AS NEEDED 100 strip 0    hydroCHLOROthiazide (HYDRODIURIL) 25 MG tablet TAKE 1 TABLET BY MOUTH EVERY MORNING 30 tablet 3    blood glucose monitor kit and supplies 1 kit by Other route 2 times daily 1 kit 0    Lancets MISC 1 each by Does not apply route daily 120 each 5    vitamin D (ERGOCALCIFEROL) 1.25 MG (78208 UT) CAPS capsule Take 1 capsule by mouth once a week 4 capsule 5    oxyCODONE-acetaminophen (PERCOCET) 5-325 MG per tablet Take 1 tablet by mouth every 4 hours as needed for Pain. No current Epic-ordered facility-administered medications on file.      Problem List Items Addressed This Visit numbness. Hematological: Negative. Does not bruise/bleed easily. Objective:   Physical Exam  Vitals and nursing note reviewed. Exam conducted with a chaperone present. Constitutional:       Appearance: She is well-developed. HENT:      Head: Normocephalic and atraumatic. Neck:      Thyroid: No thyroid mass or thyromegaly. Cardiovascular:      Rate and Rhythm: Normal rate and regular rhythm. Pulmonary:      Effort: Pulmonary effort is normal.      Breath sounds: Normal breath sounds. Chest:      Breasts:         Right: Normal. No inverted nipple, mass, nipple discharge, skin change or tenderness. Left: Normal. No inverted nipple, mass, nipple discharge, skin change or tenderness. Abdominal:      General: Bowel sounds are normal. There is no distension. Palpations: Abdomen is soft. There is no mass. Tenderness: There is no abdominal tenderness. There is no guarding or rebound. Hernia: There is no hernia in the left inguinal area. Genitourinary:     General: Normal vulva. Exam position: Supine. Labia:         Right: No rash or lesion. Left: No rash or lesion. Vagina: Normal. No signs of injury. No vaginal discharge or tenderness. Cervix: No cervical motion tenderness or discharge. Uterus: Normal. Not enlarged, not fixed and not tender. Adnexa: Right adnexa normal and left adnexa normal.        Right: No mass or tenderness. Left: No mass or tenderness. Rectum: Normal. No mass or anal fissure. Normal anal tone. Musculoskeletal:         General: No tenderness. Normal range of motion. Skin:     General: Skin is warm and dry. Neurological:      Mental Status: She is alert and oriented to person, place, and time. Psychiatric:         Judgment: Judgment normal.         Assessment:      Patient with vaginitis, otherwise normal annual exam.  Pap smear was done. Will treat with Flagyl and Diflucan.   Patient requests STD screening. Patient with menorrhagia and dysmenorrhea with known fibroid. Discussed treatment options and patient requests to be rescheduled for robotic assisted laparoscopic hysterectomy. Patient with complaint of breast pain. Will evaluate with diagnostic mammogram.        Plan:      Orders Placed This Encounter   Procedures    C.trachomatis N.gonorrhoeae DNA, Thin Prep    EDITH DIAGNOSTIC W OR WO CAD BILATERAL    PAP SMEAR    Hepatitis B Surface Antigen    HERPES PROFILE    HIV Screen    T. pallidum Ab     Orders Placed This Encounter   Medications    metroNIDAZOLE (FLAGYL) 500 MG tablet     Sig: Take 1 tablet by mouth 2 times daily for 7 days     Dispense:  14 tablet     Refill:  0    fluconazole (DIFLUCAN) 100 MG tablet     Sig: Take 1 tablet by mouth daily for 7 days     Dispense:  7 tablet     Refill:  0     Schedule for robotic assisted laparoscopic hysterectomy     ITaras am scribing for, and in the presence of Dr. Chandra Foreman. Electronically signed by: Taras Garcia 10/4/21 11:37 AM       I agree to the above documentation placed by my scribe Taras Garcia. I reviewed the scribe's note and agree with the documented findings and plan of care. Any areas of disagreement are noted on the chart. I have personally evaluated this patient. Additional findings are as noted. I agree with the chief complaint, past medical history, past surgical history, allergies, medications, social and family history as documented unless otherwise noted below.      Electronically signed by Chandra Foreman MD on 10/4/2021 at 1:13 PM

## 2021-10-05 ENCOUNTER — TELEPHONE (OUTPATIENT)
Dept: OBGYN CLINIC | Age: 41
End: 2021-10-05

## 2021-10-05 DIAGNOSIS — N76.0 ACUTE VAGINITIS: ICD-10-CM

## 2021-10-05 LAB
HEPATITIS B SURFACE ANTIGEN: NONREACTIVE
HERPES SIMPLEX VIRUS 1 IGG: 23.63
HERPES SIMPLEX VIRUS 2 IGG: 0.72
HERPES TYPE 1/2 IGM COMBINED: 0.87
HIV AG/AB: NONREACTIVE
T. PALLIDUM, IGG: NONREACTIVE

## 2021-10-06 LAB
CHLAMYDIA BY THIN PREP: NEGATIVE
N. GONORRHOEAE DNA, THIN PREP: NEGATIVE
SPECIMEN DESCRIPTION: NORMAL

## 2021-10-07 LAB
HPV SAMPLE: ABNORMAL
HPV, GENOTYPE 16: NOT DETECTED
HPV, GENOTYPE 18: NOT DETECTED
HPV, HIGH RISK OTHER: DETECTED
HPV, INTERPRETATION: ABNORMAL
SPECIMEN DESCRIPTION: ABNORMAL

## 2021-10-08 LAB — CYTOLOGY REPORT: NORMAL

## 2021-10-12 ENCOUNTER — TELEPHONE (OUTPATIENT)
Dept: OBGYN CLINIC | Age: 41
End: 2021-10-12

## 2021-10-12 NOTE — TELEPHONE ENCOUNTER
----- Message from ADRYAN Gonzalez CNP sent at 10/8/2021  2:26 PM EDT -----  Please let the patient know that her pap is normal but she does have other high risk HPV and will need a pap in 1 year per ASCCP guidelines

## 2021-10-20 ENCOUNTER — TELEPHONE (OUTPATIENT)
Dept: OBGYN CLINIC | Age: 41
End: 2021-10-20

## 2021-10-20 NOTE — TELEPHONE ENCOUNTER
Spoke with patient regarding her surgery scheduled for November. Patient stated that is to early she needs time to get off work and financially unable to do it right now. Patient would like it rescheduled for February 2022.

## 2021-10-28 ENCOUNTER — HOSPITAL ENCOUNTER (OUTPATIENT)
Dept: WOMENS IMAGING | Age: 41
Discharge: HOME | End: 2021-10-30
Payer: MEDICARE

## 2021-10-28 DIAGNOSIS — N64.4 BREAST PAIN: ICD-10-CM

## 2021-10-28 DIAGNOSIS — N60.09 COMPLEX CYST OF BREAST: Primary | ICD-10-CM

## 2021-10-28 DIAGNOSIS — R52 PAIN: ICD-10-CM

## 2021-10-28 DIAGNOSIS — R92.8 ABNORMAL MAMMOGRAM: ICD-10-CM

## 2021-10-28 PROCEDURE — G0279 TOMOSYNTHESIS, MAMMO: HCPCS

## 2021-10-28 PROCEDURE — 76642 ULTRASOUND BREAST LIMITED: CPT

## 2021-10-29 ENCOUNTER — TELEPHONE (OUTPATIENT)
Dept: OBGYN CLINIC | Age: 41
End: 2021-10-29

## 2021-10-29 NOTE — TELEPHONE ENCOUNTER
Patient is aware of follow up mammogram and US in 6 months. Patient also scheduled for appt. to see Dr. Helena Boone.

## 2021-11-02 ENCOUNTER — OFFICE VISIT (OUTPATIENT)
Dept: OBGYN CLINIC | Age: 41
End: 2021-11-02
Payer: MEDICARE

## 2021-11-02 VITALS
WEIGHT: 265 LBS | HEART RATE: 104 BPM | HEIGHT: 61 IN | SYSTOLIC BLOOD PRESSURE: 122 MMHG | DIASTOLIC BLOOD PRESSURE: 84 MMHG | BODY MASS INDEX: 50.03 KG/M2

## 2021-11-02 DIAGNOSIS — N92.4 EXCESSIVE BLEEDING IN PREMENOPAUSAL PERIOD: ICD-10-CM

## 2021-11-02 DIAGNOSIS — R10.2 PELVIC PAIN IN FEMALE: Primary | ICD-10-CM

## 2021-11-02 DIAGNOSIS — N63.10 MASSES OF BOTH BREASTS: ICD-10-CM

## 2021-11-02 DIAGNOSIS — D21.9 FIBROID: ICD-10-CM

## 2021-11-02 DIAGNOSIS — N63.20 MASSES OF BOTH BREASTS: ICD-10-CM

## 2021-11-02 PROCEDURE — G8427 DOCREV CUR MEDS BY ELIG CLIN: HCPCS | Performed by: SPECIALIST

## 2021-11-02 PROCEDURE — 99213 OFFICE O/P EST LOW 20 MIN: CPT | Performed by: SPECIALIST

## 2021-11-02 PROCEDURE — G8417 CALC BMI ABV UP PARAM F/U: HCPCS | Performed by: SPECIALIST

## 2021-11-02 PROCEDURE — G8484 FLU IMMUNIZE NO ADMIN: HCPCS | Performed by: SPECIALIST

## 2021-11-02 PROCEDURE — 1036F TOBACCO NON-USER: CPT | Performed by: SPECIALIST

## 2021-11-02 ASSESSMENT — ENCOUNTER SYMPTOMS
DIARRHEA: 0
VOMITING: 0
NAUSEA: 0
APNEA: 0
ABDOMINAL DISTENTION: 0
ABDOMINAL PAIN: 0
COUGH: 0
EYE PAIN: 0
CONSTIPATION: 0

## 2021-11-02 NOTE — PROGRESS NOTES
Subjective:      Patient ID: Bonifacio Ugalde is a 46 y.o. female. Chief Complaint   Patient presents with    Follow-up     Discuss breast US and mammogram     Ht 5' 1\" (1.549 m)   Wt 265 lb (120.2 kg)   LMP 10/25/2021   Breastfeeding No   BMI 50.07 kg/m²   Patient's last menstrual period was 10/25/2021. [id number]    Past Medical History:   Diagnosis Date    Arthritis     Bursitis     Fibroid tumor     uterine    Fibromyalgia     GERD (gastroesophageal reflux disease)     Migraine     Osteoarthritis     Prolonged emergence from general anesthesia     Renal failure     Stroke (cerebrum) ([facility] [address] 2013    TIA    Tendonitis      Current Outpatient Medications Ordered in Epic   Medication Sig Dispense Refill    pantoprazole (PROTONIX) 20 MG tablet TAKE 1 TABLET BY MOUTH EVERY MORNING BEFORE BREAKFAST 30 tablet 1    metFORMIN (GLUCOPHAGE-XR) 500 MG extended release tablet TAKE 1 TABLET BY MOUTH DAILY WITH BREAKFAST 30 tablet 5    blood glucose test strips (TRUE METRIX BLOOD GLUCOSE TEST) strip TEST TWICE DAILY AND AS NEEDED 100 strip 0    blood glucose monitor kit and supplies 1 kit by Other route 2 times daily 1 kit 0    Lancets MISC 1 each by Does not apply route daily 120 each 5    vitamin D (ERGOCALCIFEROL) 1.25 MG (36462 UT) CAPS capsule Take 1 capsule by mouth once a week 4 capsule 5    oxyCODONE-acetaminophen (PERCOCET) 5-325 MG per tablet Take 1 tablet by mouth every 4 hours as needed for Pain.  hydroCHLOROthiazide (HYDRODIURIL) 25 MG tablet TAKE 1 TABLET BY MOUTH EVERY MORNING (Patient not taking: Reported on 11/2/2021) 30 tablet 3     No current Epic-ordered facility-administered medications on file.      Problem List Items Addressed This Visit     None        Allergies   Allergen Reactions    Latex Itching    Vicodin [Hydrocodone-Acetaminophen] Hives    Bactrim [Sulfamethoxazole-Trimethoprim]     Codeine     Ibuprofen     Sulfa Antibiotics      No orders of the defined types were placed in this encounter. Patient is here today to discuss scheduling her hysterectomy. She states that she requested that this be scheduled in February 2022 because of work, but she is in so much pain right now that she wants the hysterectomy done ASAP. She thinks that the fibroid must be growing because the pain is so bad. The pain causes her to rock and to breath. She has an Essure. Patient is unsure if she wants more children. Review of Systems   Constitutional: Negative for activity change, appetite change and fever. HENT: Negative for ear discharge and ear pain. Eyes: Negative for pain and visual disturbance. Respiratory: Negative for apnea and cough. Cardiovascular: Negative for chest pain, palpitations and leg swelling. Gastrointestinal: Negative for abdominal distention, abdominal pain, constipation, diarrhea, nausea and vomiting. Endocrine: Negative. Genitourinary: Positive for pelvic pain. Negative for difficulty urinating, dysuria and menstrual problem. Breast pain   Musculoskeletal: Negative for neck pain and neck stiffness. Skin: Negative. Neurological: Negative for light-headedness and numbness. Hematological: Negative. Does not bruise/bleed easily. Objective:   Physical Exam  Vitals and nursing note reviewed. Constitutional:       Appearance: She is well-developed. HENT:      Head: Normocephalic and atraumatic. Neck:      Thyroid: No thyromegaly. Cardiovascular:      Rate and Rhythm: Normal rate and regular rhythm. Pulmonary:      Effort: Pulmonary effort is normal.      Breath sounds: Normal breath sounds. No wheezing. Abdominal:      General: Bowel sounds are normal. There is no distension. Palpations: Abdomen is soft. There is no mass. Tenderness: There is no abdominal tenderness. There is no guarding. Musculoskeletal:         General: Normal range of motion.       Cervical back: Normal range of motion and neck supple. Skin:     General: Skin is dry. Neurological:      Mental Status: She is alert and oriented to person, place, and time. Psychiatric:         Behavior: Behavior normal.         Thought Content: Thought content normal.         Assessment:      Patient post Essure with pelvic pain and known fibroid. Patient is unsure if she wants another child at this time. She would like to have Essure removed and to have the fibroid removed. Explained to patient that there is no guarantee that this will cure her pelvic pain, or that she will become pregnant following removal.  Patient was told that her fallopian tubes will be removed. If patient wishes to become pregnant, she was advised to see a specialist for invitro-fertilization. Will schedule patient for bilateral salpingectomy with removal of Essure, possible myomectomy with possible hysterectomy. Patient with bilateral breast masses per mammogram and breast ultrasound. Discuss these results in detail with patient and she understands the importance of follow up in 6 months. Plan:      Schedule patient for bilateral salpingectomy with removal of Essure, possible myomectomy with possible hysterectomy    I, Sera Rudd, am scribing for, and in the presence of Dr. Stephanie Aguilera. Electronically signed by: Sera Rudd 11/2/21 2:17 PM       I agree to the above documentation placed by my scribe Sera Rudd. I reviewed the scribe's note and agree with the documented findings and plan of care. Any areas of disagreement are noted on the chart. I have personally evaluated this patient. Additional findings are as noted. I agree with the chief complaint, past medical history, past surgical history, allergies, medications, social and family history as documented unless otherwise noted below.      Electronically signed by Stephanie Aguilera MD on 11/2/2021 at 3:28 PM

## 2021-11-03 PROBLEM — Z01.419 PAP SMEAR, AS PART OF ROUTINE GYNECOLOGICAL EXAMINATION: Status: RESOLVED | Noted: 2021-10-04 | Resolved: 2021-11-03

## 2021-11-03 PROBLEM — Z11.51 SCREENING FOR HUMAN PAPILLOMAVIRUS: Status: RESOLVED | Noted: 2021-10-04 | Resolved: 2021-11-03

## 2022-02-03 ENCOUNTER — TELEPHONE (OUTPATIENT)
Dept: OBGYN CLINIC | Age: 42
End: 2022-02-03

## 2022-02-03 NOTE — TELEPHONE ENCOUNTER
Patient was calling to see if her surgery from November was going to be rescheduled for this month. She doesn't have a working phone at this time. She would like to be contacted thru the [facility].   Thanks

## 2022-02-07 ENCOUNTER — PATIENT MESSAGE (OUTPATIENT)
Dept: OBGYN CLINIC | Age: 42
End: 2022-02-07

## 2022-02-07 NOTE — TELEPHONE ENCOUNTER
From: Win Triplett  To: Dr. Shreya Acosta: 2/7/2022 1:01 PM EST  Subject: Surgery scheduling     I was trying to schedule an appointment. When I called last week she let me know that she sent a message for someone to contact me through [address] She also informed me that paperwork has to be signed after the procedure scheduling. Can the office schedule please schedule my surgery Im tired of being in pain. Right now I dont have a working phone. Feel free to contact my emergency contacts listed on my profile.

## 2022-02-11 ENCOUNTER — OFFICE VISIT (OUTPATIENT)
Dept: OBGYN CLINIC | Age: 42
End: 2022-02-11
Payer: MEDICARE

## 2022-02-11 VITALS
SYSTOLIC BLOOD PRESSURE: 130 MMHG | HEIGHT: 61 IN | HEART RATE: 97 BPM | DIASTOLIC BLOOD PRESSURE: 86 MMHG | WEIGHT: 253 LBS | BODY MASS INDEX: 47.77 KG/M2

## 2022-02-11 DIAGNOSIS — D21.9 FIBROID: Primary | ICD-10-CM

## 2022-02-11 DIAGNOSIS — N93.9 ABNORMAL UTERINE BLEEDING (AUB): ICD-10-CM

## 2022-02-11 DIAGNOSIS — R10.2 PELVIC PAIN IN FEMALE: ICD-10-CM

## 2022-02-11 PROCEDURE — 1036F TOBACCO NON-USER: CPT | Performed by: SPECIALIST

## 2022-02-11 PROCEDURE — G8427 DOCREV CUR MEDS BY ELIG CLIN: HCPCS | Performed by: SPECIALIST

## 2022-02-11 PROCEDURE — G8417 CALC BMI ABV UP PARAM F/U: HCPCS | Performed by: SPECIALIST

## 2022-02-11 PROCEDURE — 99213 OFFICE O/P EST LOW 20 MIN: CPT | Performed by: SPECIALIST

## 2022-02-11 PROCEDURE — G8484 FLU IMMUNIZE NO ADMIN: HCPCS | Performed by: SPECIALIST

## 2022-02-11 SDOH — ECONOMIC STABILITY: TRANSPORTATION INSECURITY
IN THE PAST 12 MONTHS, HAS THE LACK OF TRANSPORTATION KEPT YOU FROM MEDICAL APPOINTMENTS OR FROM GETTING MEDICATIONS?: NO

## 2022-02-11 SDOH — ECONOMIC STABILITY: FOOD INSECURITY: WITHIN THE PAST 12 MONTHS, THE FOOD YOU BOUGHT JUST DIDN'T LAST AND YOU DIDN'T HAVE MONEY TO GET MORE.: NEVER TRUE

## 2022-02-11 SDOH — ECONOMIC STABILITY: TRANSPORTATION INSECURITY
IN THE PAST 12 MONTHS, HAS LACK OF TRANSPORTATION KEPT YOU FROM MEETINGS, WORK, OR FROM GETTING THINGS NEEDED FOR DAILY LIVING?: NO

## 2022-02-11 SDOH — ECONOMIC STABILITY: FOOD INSECURITY: WITHIN THE PAST 12 MONTHS, YOU WORRIED THAT YOUR FOOD WOULD RUN OUT BEFORE YOU GOT MONEY TO BUY MORE.: NEVER TRUE

## 2022-02-11 ASSESSMENT — ENCOUNTER SYMPTOMS
APNEA: 0
VOMITING: 0
DIARRHEA: 0
ABDOMINAL PAIN: 0
EYE PAIN: 0
COUGH: 0
CONSTIPATION: 0
NAUSEA: 0
ABDOMINAL DISTENTION: 0

## 2022-02-11 ASSESSMENT — PATIENT HEALTH QUESTIONNAIRE - PHQ9
SUM OF ALL RESPONSES TO PHQ9 QUESTIONS 1 & 2: 0
SUM OF ALL RESPONSES TO PHQ QUESTIONS 1-9: 0
2. FEELING DOWN, DEPRESSED OR HOPELESS: 0
1. LITTLE INTEREST OR PLEASURE IN DOING THINGS: 0
SUM OF ALL RESPONSES TO PHQ QUESTIONS 1-9: 0

## 2022-02-11 ASSESSMENT — SOCIAL DETERMINANTS OF HEALTH (SDOH): HOW HARD IS IT FOR YOU TO PAY FOR THE VERY BASICS LIKE FOOD, HOUSING, MEDICAL CARE, AND HEATING?: NOT HARD AT ALL

## 2022-02-11 NOTE — TELEPHONE ENCOUNTER
Sent patient a My Chart message with dates and times for her surgical procedure.  Patient is schedule 3/3/22 at [facility]

## 2022-02-11 NOTE — PROGRESS NOTES
Subjective:      Patient ID: Alhaji Yepez is a 46 y.o. female. Chief Complaint   Patient presents with    Surgical Consult     /86   Pulse 97   Ht 5' 1\" (1.549 m)   Wt 253 lb (114.8 kg)   LMP 02/11/2022 (Approximate)   BMI 47.80 kg/m²   Patient's last menstrual period was 02/11/2022 (approximate). [id number]    Past Medical History:   Diagnosis Date    Arthritis     Bursitis     Fibroid tumor     uterine    Fibromyalgia     GERD (gastroesophageal reflux disease)     Migraine     Osteoarthritis     Prolonged emergence from general anesthesia     Renal failure     Stroke (cerebrum) ([facility] [address] 2013    TIA    Tendonitis      Current Outpatient Medications Ordered in Epic   Medication Sig Dispense Refill    pantoprazole (PROTONIX) 20 MG tablet TAKE 1 TABLET BY MOUTH EVERY MORNING BEFORE BREAKFAST 30 tablet 1    metFORMIN (GLUCOPHAGE-XR) 500 MG extended release tablet TAKE 1 TABLET BY MOUTH DAILY WITH BREAKFAST 30 tablet 5    blood glucose test strips (TRUE METRIX BLOOD GLUCOSE TEST) strip TEST TWICE DAILY AND AS NEEDED 100 strip 0    blood glucose monitor kit and supplies 1 kit by Other route 2 times daily 1 kit 0    Lancets MISC 1 each by Does not apply route daily 120 each 5    vitamin D (ERGOCALCIFEROL) 1.25 MG (96451 UT) CAPS capsule Take 1 capsule by mouth once a week 4 capsule 5    oxyCODONE-acetaminophen (PERCOCET) 5-325 MG per tablet Take 1 tablet by mouth every 4 hours as needed for Pain.  hydroCHLOROthiazide (HYDRODIURIL) 25 MG tablet TAKE 1 TABLET BY MOUTH EVERY MORNING (Patient not taking: Reported on 11/2/2021) 30 tablet 3     No current Epic-ordered facility-administered medications on file.      Problem List Items Addressed This Visit     Excessive bleeding in premenopausal period    Pelvic pain in female    Fibroid - Primary        Allergies   Allergen Reactions    Latex Itching    Vicodin [Hydrocodone-Acetaminophen] Hives    Bactrim [Sulfamethoxazole-Trimethoprim]     Codeine     Ibuprofen     Sulfa Antibiotics      No orders of the defined types were placed in this encounter. Patient is here today to discuss having her surgery rescheduled again. She has fibroids with pelvic pain and abnormal uterine bleeding. Patient wants a hysterectomy now. She is having a lot of pain and bleeding. She states that \"this is bad\". Her bleeding is \"ridiculous\" and she has clots. Patient wants, \"everything removed because I don't want to worry about you having to go back in\". Review of Systems   Constitutional: Negative for activity change, appetite change and fever. HENT: Negative for ear discharge and ear pain. Eyes: Negative for pain and visual disturbance. Respiratory: Negative for apnea and cough. Cardiovascular: Negative for chest pain, palpitations and leg swelling. Gastrointestinal: Negative for abdominal distention, abdominal pain, constipation, diarrhea, nausea and vomiting. Endocrine: Negative. Genitourinary: Positive for menstrual problem and pelvic pain. Negative for difficulty urinating and dysuria. Musculoskeletal: Negative for neck pain and neck stiffness. Skin: Negative. Neurological: Negative for light-headedness and numbness. Hematological: Negative. Does not bruise/bleed easily. Objective:   Physical Exam  Vitals and nursing note reviewed. Constitutional:       Appearance: She is well-developed. HENT:      Head: Normocephalic and atraumatic. Neck:      Thyroid: No thyromegaly. Cardiovascular:      Rate and Rhythm: Normal rate and regular rhythm. Pulmonary:      Effort: Pulmonary effort is normal.      Breath sounds: Normal breath sounds. No wheezing. Abdominal:      General: Bowel sounds are normal. There is no distension. Palpations: Abdomen is soft. There is no mass. Tenderness: There is no abdominal tenderness. There is no guarding.    Musculoskeletal: General: Normal range of motion. Cervical back: Normal range of motion and neck supple. Skin:     General: Skin is dry. Neurological:      Mental Status: She is alert and oriented to person, place, and time. Psychiatric:         Behavior: Behavior normal.         Thought Content: Thought content normal.         Assessment:      Patient with pelvic pain and AUB with known fibroids. Patient requests hysterectomy at this time. Will schedule patient for robotic assisted laparoscopic hysterectomy and bilateral salpingectomy for removal of Essure. Explained to patient that her ovaries will be preserved unless surgical findings warrant removal (such as endometriosis). Plan:      Schedule for robotic assisted laparoscopic hysterectomy with bilateral salpingectomy for removal of Essure. Cece Ervin am scribing for, and in the presence of Dr. Pedro Woodward. Electronically signed by: Elena Ko 2/11/22 10:25 AM       I agree to the above documentation placed by my scribe Elena Ko. I reviewed the scribe's note and agree with the documented findings and plan of care. Any areas of disagreement are noted on the chart. I have personally evaluated this patient. Additional findings are as noted. I agree with the chief complaint, past medical history, past surgical history, allergies, medications, social and family history as documented unless otherwise noted below.      Electronically signed by Pedro Woodward MD on 2/13/2022 at 3:14 PM

## 2022-02-17 ENCOUNTER — HOSPITAL ENCOUNTER (OUTPATIENT)
Dept: PREADMISSION TESTING | Age: 42
Discharge: HOME | End: 2022-02-21
Payer: MEDICARE

## 2022-02-17 VITALS
HEART RATE: 98 BPM | SYSTOLIC BLOOD PRESSURE: 138 MMHG | WEIGHT: 253 LBS | HEIGHT: 61 IN | DIASTOLIC BLOOD PRESSURE: 99 MMHG | OXYGEN SATURATION: 100 % | BODY MASS INDEX: 47.77 KG/M2 | RESPIRATION RATE: 16 BRPM | TEMPERATURE: 97.8 F

## 2022-02-17 LAB
ABO/RH: NORMAL
ABSOLUTE EOS #: 0.25 K/UL (ref 0–0.4)
ABSOLUTE IMMATURE GRANULOCYTE: ABNORMAL K/UL (ref 0–0.3)
ABSOLUTE LYMPH #: 1.98 K/UL (ref 1–4.8)
ABSOLUTE MONO #: 0.37 K/UL (ref 0.1–1.3)
ANION GAP SERPL CALCULATED.3IONS-SCNC: 11 MMOL/L (ref 9–17)
ANTIBODY SCREEN: NEGATIVE
ARM BAND NUMBER: NORMAL
BASOPHILS # BLD: 0 % (ref 0–2)
BASOPHILS ABSOLUTE: 0 K/UL (ref 0–0.2)
BLOOD BANK COMMENT: NORMAL
BUN BLDV-MCNC: 12 MG/DL (ref 6–20)
BUN/CREAT BLD: ABNORMAL (ref 9–20)
CALCIUM SERPL-MCNC: 9.5 MG/DL (ref 8.6–10.4)
CHLORIDE BLD-SCNC: 100 MMOL/L (ref 98–107)
CO2: 26 MMOL/L (ref 20–31)
CREAT SERPL-MCNC: 1.1 MG/DL (ref 0.5–0.9)
DIFFERENTIAL TYPE: ABNORMAL
EOSINOPHILS RELATIVE PERCENT: 4 % (ref 0–4)
EXPIRATION DATE: NORMAL
GFR AFRICAN AMERICAN: >60 ML/MIN
GFR NON-AFRICAN AMERICAN: 55 ML/MIN
GFR SERPL CREATININE-BSD FRML MDRD: ABNORMAL ML/MIN/{1.73_M2}
GFR SERPL CREATININE-BSD FRML MDRD: ABNORMAL ML/MIN/{1.73_M2}
GLUCOSE BLD-MCNC: 102 MG/DL (ref 70–99)
HCT VFR BLD CALC: 34.9 % (ref 36–46)
HEMOGLOBIN: 11.3 G/DL (ref 12–16)
IMMATURE GRANULOCYTES: ABNORMAL %
LYMPHOCYTES # BLD: 32 % (ref 24–44)
MCH RBC QN AUTO: 25.1 PG (ref 26–34)
MCHC RBC AUTO-ENTMCNC: 32.3 G/DL (ref 31–37)
MCV RBC AUTO: 77.5 FL (ref 80–100)
MONOCYTES # BLD: 6 % (ref 1–7)
MORPHOLOGY: ABNORMAL
NRBC AUTOMATED: ABNORMAL PER 100 WBC
PDW BLD-RTO: 17.2 % (ref 11.5–14.9)
PLATELET # BLD: 414 K/UL (ref 150–450)
PLATELET ESTIMATE: ABNORMAL
PMV BLD AUTO: 7.8 FL (ref 6–12)
POTASSIUM SERPL-SCNC: 4.2 MMOL/L (ref 3.7–5.3)
RBC # BLD: 4.5 M/UL (ref 4–5.2)
RBC # BLD: ABNORMAL 10*6/UL
SEG NEUTROPHILS: 58 % (ref 36–66)
SEGMENTED NEUTROPHILS ABSOLUTE COUNT: 3.6 K/UL (ref 1.3–9.1)
SODIUM BLD-SCNC: 137 MMOL/L (ref 135–144)
WBC # BLD: 6.2 K/UL (ref 3.5–11)
WBC # BLD: ABNORMAL 10*3/UL

## 2022-02-17 PROCEDURE — 80048 BASIC METABOLIC PNL TOTAL CA: CPT

## 2022-02-17 PROCEDURE — 93005 ELECTROCARDIOGRAM TRACING: CPT | Performed by: NURSE PRACTITIONER

## 2022-02-17 PROCEDURE — 85025 COMPLETE CBC W/AUTO DIFF WBC: CPT

## 2022-02-17 PROCEDURE — 99203 OFFICE O/P NEW LOW 30 MIN: CPT | Performed by: NURSE PRACTITIONER

## 2022-02-17 PROCEDURE — 86900 BLOOD TYPING SEROLOGIC ABO: CPT

## 2022-02-17 PROCEDURE — 86901 BLOOD TYPING SEROLOGIC RH(D): CPT

## 2022-02-17 PROCEDURE — 36415 COLL VENOUS BLD VENIPUNCTURE: CPT

## 2022-02-17 PROCEDURE — 86850 RBC ANTIBODY SCREEN: CPT

## 2022-02-17 RX ORDER — FERROUS SULFATE 325(65) MG
325 TABLET ORAL
COMMUNITY

## 2022-02-17 RX ORDER — M-VIT,TX,IRON,MINS/CALC/FOLIC 27MG-0.4MG
1 TABLET ORAL DAILY
COMMUNITY

## 2022-02-17 ASSESSMENT — ENCOUNTER SYMPTOMS
SORE THROAT: 0
WHEEZING: 0
DIARRHEA: 0
NAUSEA: 0
COUGH: 0
SHORTNESS OF BREATH: 0
BACK PAIN: 1
ABDOMINAL PAIN: 1
VOMITING: 0
CONSTIPATION: 0

## 2022-02-17 ASSESSMENT — PAIN DESCRIPTION - PAIN TYPE: TYPE: CHRONIC PAIN

## 2022-02-17 ASSESSMENT — PAIN DESCRIPTION - DESCRIPTORS: DESCRIPTORS: CRAMPING

## 2022-02-17 ASSESSMENT — PAIN SCALES - GENERAL: PAINLEVEL_OUTOF10: 8

## 2022-02-17 ASSESSMENT — PAIN DESCRIPTION - ORIENTATION: ORIENTATION: LOWER

## 2022-02-17 ASSESSMENT — PAIN DESCRIPTION - LOCATION: LOCATION: ABDOMEN

## 2022-02-17 NOTE — H&P (VIEW-ONLY)
HISTORY and [facility] [id number]       NAME:  Iban Luevano  MRN: [id number]   YOB: 1980   Date: 2/17/2022   Age: 46 y.o. Gender: female       COMPLAINT AND PRESENT HISTORY:     Iban Luevano is 46 y.o., female, preadmission testing for uterine fibroids, irregular bleeding with scheduled laparoscopic robotic abdominal hysterectomy. She reports she has always had irregular menstrual cycles since she was a teen. Reports uterine fibroids initially diagnosed in 2018 but has grown in size. Pt c/o menorrhagia with heavy flow lasting for 7-30 days. This is associated with lower abdominal/ pelvic pains and cramping that radiates to her back. She has occasional blood clots are present in the flow. LMP was 02/02/2022 that lasted approximately 9 days. She does have dyspareunia. No vaginal discharge, sores or itching. Pt is not on any hormones at this time. No urinary symptoms. No fever or chills. No Hx of MRSA infections in the past.      PMHx includes:    TIA: This occurred in 2013. She reports she had slurred speech and left eye vision changes, dizziness. Pt reports she found out she was in renal failure at the time this occurred. She reports she does continue to have an occasional stutter and stops speaking mid-sentence at times but otherwise does not have residual neurological deficits. She does not see neurologist.     Egnar Hopping: She used to take imitrex but she discontinued due to ineffectiveness. She now takes Excedrin with good relief. She does get frequent migraines with last being last week. DM: She was prescribed metformin but she does not take because she \"found masses to her bilateral breast\" and she takes a \"water pill for my heart and the meds don't coincide\" she reports she is due to follow up with her PCP regarding DM.      Lab Results   Component Value Date    LABA1C 5.8 02/05/2021     Lab Results   Component Value Date     02/05/2021       Pt has prolonged emergence from anesthesia. Otherwise, denies personal and family history of complications with anesthesia.        PAST MEDICAL HISTORY     Past Medical History:   Diagnosis Date    Arthritis     Bursitis     Diabetes mellitus ([facility] [address]     borderline    Fibroid tumor     uterine    Fibromyalgia     GERD (gastroesophageal reflux disease)     Hypertension     Migraine     Osteoarthritis     bilat hips    Prolonged emergence from general anesthesia     Renal failure     Stroke (cerebrum) ([facility] [address] 2013    TIA    Tendonitis        SURGICAL HISTORY       Past Surgical History:   Procedure Laterality Date    CHOLECYSTECTOMY, LAPAROSCOPIC N/A 4/23/2020    CHOLECYSTECTOMY LAPAROSCOPIC performed by Thee Muller MD at [facility] N/A 3/5/2021    COLONOSCOPY WITH BIOPSY performed by Christian Lezama MD at [address] [place] [address] [address]      2008- essure    UPPER GASTROINTESTINAL ENDOSCOPY  11/2/2018    EGD BIOPSY performed by Argenis Segovia DO at [facility]    UPPER GASTROINTESTINAL ENDOSCOPY N/A 3/5/2021    EGD BIOPSY performed by Christian Lezama MD at [address] [place] [address]       Family History   Problem Relation Age of Onset    Arthritis Mother     Mental Illness Mother     Arthritis Father     Breast Cancer Maternal Aunt     Breast Cancer Maternal Aunt        SOCIAL HISTORY       Social History     Socioeconomic History    Marital status: Single     Spouse name: None    Number of children: None    Years of education: None    Highest education level: None   Occupational History    None   Tobacco Use    Smoking status: Former Smoker    Smokeless tobacco: Never Used   Vaping Use    Vaping Use: Never used   Substance and Sexual Activity    Alcohol use: Yes     Comment: occassional    Drug use: No    Sexual activity: None   Other Topics Concern    None   Social History Narrative    None Social Determinants of Health     Financial Resource Strain: Low Risk     Difficulty of Paying Living Expenses: Not hard at all   Food Insecurity: No Food Insecurity    Worried About Running Out of Food in the Last Year: Never true    Darleen of Food in the Last Year: Never true   Transportation Needs: No Transportation Needs    Lack of Transportation (Medical): No    Lack of Transportation (Non-Medical):  No   Physical Activity:     Days of Exercise per Week: Not on file    Minutes of Exercise per Session: Not on file   Stress:     Feeling of Stress : Not on file   Social Connections:     Frequency of Communication with Friends and Family: Not on file    Frequency of Social Gatherings with Friends and Family: Not on file    Attends [employer] Services: Not on file    Active Member of [address] [place] [address] or Organizations: Not on file    Attends Club or Organization Meetings: Not on file    Marital Status: Not on file   Intimate Partner Violence:     Fear of Current or Ex-Partner: Not on file    Emotionally Abused: Not on file    Physically Abused: Not on file    Sexually Abused: Not on file   Housing Stability:     Unable to Pay for Housing in the Last Year: Not on file    Number of [place] in the Last Year: Not on file    Unstable Housing in the Last Year: Not on file        REVIEW OF SYSTEMS      Allergies   Allergen Reactions    Latex Itching    Vicodin [Hydrocodone-Acetaminophen] Hives    Bactrim [Sulfamethoxazole-Trimethoprim]     Codeine     Ibuprofen     Sulfa Antibiotics        Current Outpatient Medications on File Prior to Encounter   Medication Sig Dispense Refill    Multiple Vitamins-Minerals (THERAPEUTIC MULTIVITAMIN-MINERALS) tablet Take 1 tablet by mouth daily      ferrous sulfate (IRON 325) 325 (65 Fe) MG tablet Take 325 mg by mouth daily (with breakfast)      hydroCHLOROthiazide (HYDRODIURIL) 25 MG tablet TAKE 1 TABLET BY MOUTH EVERY MORNING 30 tablet 3    pantoprazole (PROTONIX) 20 MG tablet TAKE 1 TABLET BY MOUTH EVERY MORNING BEFORE BREAKFAST 30 tablet 1    vitamin D (ERGOCALCIFEROL) 1.25 MG (49015 UT) CAPS capsule Take 1 capsule by mouth once a week 4 capsule 5    oxyCODONE-acetaminophen (PERCOCET) 5-325 MG per tablet Take 1 tablet by mouth every 4 hours as needed for Pain.  blood glucose test strips (TRUE METRIX BLOOD GLUCOSE TEST) strip TEST TWICE DAILY AND AS NEEDED 100 strip 0    blood glucose monitor kit and supplies 1 kit by Other route 2 times daily 1 kit 0    Lancets MISC 1 each by Does not apply route daily 120 each 5     No current facility-administered medications on file prior to encounter. Review of Systems   Constitutional: Negative for appetite change, chills, fever and unexpected weight change. HENT: Negative for congestion and sore throat. Eyes: Positive for visual disturbance (Glasses/contacts). Respiratory: Negative for cough, shortness of breath and wheezing. Cardiovascular: Positive for leg swelling. Negative for chest pain and palpitations. Gastrointestinal: Positive for abdominal pain. Negative for constipation, diarrhea, nausea and vomiting. Genitourinary: Positive for pelvic pain. Negative for dysuria, frequency, hematuria and urgency. Musculoskeletal: Positive for back pain. Skin: Negative for rash and wound. Neurological: Positive for headaches. Negative for dizziness, speech difficulty and light-headedness. Hematological: Bruises/bleeds easily. Psychiatric/Behavioral: Negative. GENERAL PHYSICAL EXAM     Vitals: BP (!) 138/99   Pulse 98   Temp 97.8 °F (36.6 °C) (Infrared)   Resp 16   Ht 5' 1\" (1.549 m)   Wt 253 lb (114.8 kg)   LMP 02/11/2022 (Approximate)   SpO2 100%   BMI 47.80 kg/m²  Body mass index is 47.8 kg/m². Physical Exam  Constitutional:       General: She is not in acute distress. Appearance: She is well-developed. She is obese.  She is not ill-appearing, toxic-appearing or diaphoretic. HENT:      Head: Normocephalic and atraumatic. Nose: Nose normal. No congestion. Mouth/Throat:      Mouth: Mucous membranes are moist.      Pharynx: Oropharynx is clear. No oropharyngeal exudate or posterior oropharyngeal erythema. Eyes:      General: No scleral icterus. Right eye: No discharge. Left eye: No discharge. Conjunctiva/sclera: Conjunctivae normal.      Pupils: Pupils are equal, round, and reactive to light. Neck:      Trachea: No tracheal deviation. Cardiovascular:      Rate and Rhythm: Normal rate and regular rhythm. Heart sounds: Normal heart sounds. No murmur heard. No friction rub. No gallop. Pulmonary:      Effort: Pulmonary effort is normal. No respiratory distress. Breath sounds: Normal breath sounds. No wheezing, rhonchi or rales. Abdominal:      General: Bowel sounds are normal. There is no distension. Palpations: Abdomen is soft. Tenderness: There is no abdominal tenderness. There is no guarding. Musculoskeletal:         General: No swelling or tenderness. Cervical back: Neck supple. No tenderness. Right lower leg: Edema (Trace, has compression stockings on) present. Left lower leg: Edema (Trace, has compression stockings on ) present. Skin:     General: Skin is warm and dry. Coloration: Skin is not jaundiced. Findings: No bruising, erythema or rash. Neurological:      General: No focal deficit present. Mental Status: She is alert and oriented to person, place, and time. Cranial Nerves: No cranial nerve deficit.       Gait: Gait normal.   Psychiatric:         Mood and Affect: Mood normal.        PROVISIONAL DIAGNOSES / SURGERY:      UTERINE FIBROIDS, IRREGULAR BLEEDING     HYSTERECTOMY ABDOMINAL LAPAROSCOPIC ROBOTIC XI    Patient Active Problem List    Diagnosis Date Noted    Masses of both breasts 11/02/2021    Acute vaginitis 10/04/2021    Dysmenorrhea 10/04/2021    Fibroid 10/04/2021    Breast pain 10/04/2021    Intramural and submucous leiomyoma of uterus 03/12/2021    Elevated blood pressure reading 02/24/2021    Excessive bleeding in premenopausal period 02/24/2021    Pelvic pain in female 02/24/2021    S/P laparoscopic cholecystectomy 51/55/3122    Biliary colic 45/92/0003    Hiatal hernia with GERD without esophagitis     Primary osteoarthritis of both hips 04/12/2017    Tendonitis involving hip abductors 04/12/2017    DDD (degenerative disc disease), lumbar 05/13/2015    Trochanteric bursitis 05/13/2015    Vitamin D deficiency 05/13/2015    Migraine headache 03/25/2013         Total time spent on encounter- PAT provider minutes: 31-40 minutes      ADRYAN Brandt CNP on 2/17/2022 at 11:52 AM

## 2022-02-17 NOTE — H&P
HISTORY and Rob[id number]       NAME:  Garrick Buck  MRN: [id number]   YOB: 1980   Date: 2/17/2022   Age: 46 y.o. Gender: female       COMPLAINT AND PRESENT HISTORY:     Garrick Buck is 46 y.o., female, preadmission testing for uterine fibroids, irregular bleeding with scheduled laparoscopic robotic abdominal hysterectomy. She reports she has always had irregular menstrual cycles since she was a teen. Reports uterine fibroids initially diagnosed in 2018 but has grown in size. Pt c/o menorrhagia with heavy flow lasting for 7-30 days. This is associated with lower abdominal/ pelvic pains and cramping that radiates to her back. She has occasional blood clots are present in the flow. LMP was 02/02/2022 that lasted approximately 9 days. She does have dyspareunia. No vaginal discharge, sores or itching. Pt is not on any hormones at this time. No urinary symptoms. No fever or chills. No Hx of MRSA infections in the past.      PMHx includes:    TIA: This occurred in 2013. She reports she had slurred speech and left eye vision changes, dizziness. Pt reports she found out she was in renal failure at the time this occurred. She reports she does continue to have an occasional stutter and stops speaking mid-sentence at times but otherwise does not have residual neurological deficits. She does not see neurologist.     Zen Walker: She used to take imitrex but she discontinued due to ineffectiveness. She now takes Excedrin with good relief. She does get frequent migraines with last being last week. DM: She was prescribed metformin but she does not take because she \"found masses to her bilateral breast\" and she takes a \"water pill for my heart and the meds don't coincide\" she reports she is due to follow up with her PCP regarding DM.      Lab Results   Component Value Date    LABA1C 5.8 02/05/2021     Lab Results   Component Value Date     02/05/2021       Pt has prolonged emergence from anesthesia. Otherwise, denies personal and family history of complications with anesthesia.        PAST MEDICAL HISTORY     Past Medical History:   Diagnosis Date    Arthritis     Bursitis     Diabetes mellitus ([facility]     borderline    Fibroid tumor     uterine    Fibromyalgia     GERD (gastroesophageal reflux disease)     Hypertension     Migraine     Osteoarthritis     bilat hips    Prolonged emergence from general anesthesia     Renal failure     Stroke (cerebrum) ([facility] [address] 2013    TIA    Tendonitis        SURGICAL HISTORY       Past Surgical History:   Procedure Laterality Date    CHOLECYSTECTOMY, LAPAROSCOPIC N/A 4/23/2020    CHOLECYSTECTOMY LAPAROSCOPIC performed by Larena Phalen, MD at [place] [address] N/A 3/5/2021    COLONOSCOPY WITH BIOPSY performed by Nisha Hernández MD at [address] [place] [address] [address]      2008- essure    UPPER GASTROINTESTINAL ENDOSCOPY  11/2/2018    EGD BIOPSY performed by Marina Membreno DO at [facility]    UPPER GASTROINTESTINAL ENDOSCOPY N/A 3/5/2021    EGD BIOPSY performed by Nisha Hernández MD at [address] [place] [address]       Family History   Problem Relation Age of Onset    Arthritis Mother     Mental Illness Mother     Arthritis Father     Breast Cancer Maternal Aunt     Breast Cancer Maternal Aunt        SOCIAL HISTORY       Social History     Socioeconomic History    Marital status: Single     Spouse name: None    Number of children: None    Years of education: None    Highest education level: None   Occupational History    None   Tobacco Use    Smoking status: Former Smoker    Smokeless tobacco: Never Used   Vaping Use    Vaping Use: Never used   Substance and Sexual Activity    Alcohol use: Yes     Comment: occassional    Drug use: No    Sexual activity: None   Other Topics Concern    None   Social History Narrative    None Social Determinants of Health     Financial Resource Strain: Low Risk     Difficulty of Paying Living Expenses: Not hard at all   Food Insecurity: No Food Insecurity    Worried About Running Out of Food in the Last Year: Never true    Darleen of Food in the Last Year: Never true   Transportation Needs: No Transportation Needs    Lack of Transportation (Medical): No    Lack of Transportation (Non-Medical):  No   Physical Activity:     Days of Exercise per Week: Not on file    Minutes of Exercise per Session: Not on file   Stress:     Feeling of Stress : Not on file   Social Connections:     Frequency of Communication with Friends and Family: Not on file    Frequency of Social Gatherings with Friends and Family: Not on file    Attends Judaism Services: Not on file    Active Member of [address] [place] [address] or Organizations: Not on file    Attends Club or Organization Meetings: Not on file    Marital Status: Not on file   Intimate Partner Violence:     Fear of Current or Ex-Partner: Not on file    Emotionally Abused: Not on file    Physically Abused: Not on file    Sexually Abused: Not on file   Housing Stability:     Unable to Pay for Housing in the Last Year: Not on file    Number of [place] in the Last Year: Not on file    Unstable Housing in the Last Year: Not on file        REVIEW OF SYSTEMS      Allergies   Allergen Reactions    Latex Itching    Vicodin [Hydrocodone-Acetaminophen] Hives    Bactrim [Sulfamethoxazole-Trimethoprim]     Codeine     Ibuprofen     Sulfa Antibiotics        Current Outpatient Medications on File Prior to Encounter   Medication Sig Dispense Refill    Multiple Vitamins-Minerals (THERAPEUTIC MULTIVITAMIN-MINERALS) tablet Take 1 tablet by mouth daily      ferrous sulfate (IRON 325) 325 (65 Fe) MG tablet Take 325 mg by mouth daily (with breakfast)      hydroCHLOROthiazide (HYDRODIURIL) 25 MG tablet TAKE 1 TABLET BY MOUTH EVERY MORNING 30 tablet 3    pantoprazole (PROTONIX) 20 MG tablet TAKE 1 TABLET BY MOUTH EVERY MORNING BEFORE BREAKFAST 30 tablet 1    vitamin D (ERGOCALCIFEROL) 1.25 MG (53552 UT) CAPS capsule Take 1 capsule by mouth once a week 4 capsule 5    oxyCODONE-acetaminophen (PERCOCET) 5-325 MG per tablet Take 1 tablet by mouth every 4 hours as needed for Pain.  blood glucose test strips (TRUE METRIX BLOOD GLUCOSE TEST) strip TEST TWICE DAILY AND AS NEEDED 100 strip 0    blood glucose monitor kit and supplies 1 kit by Other route 2 times daily 1 kit 0    Lancets MISC 1 each by Does not apply route daily 120 each 5     No current facility-administered medications on file prior to encounter. Review of Systems   Constitutional: Negative for appetite change, chills, fever and unexpected weight change. HENT: Negative for congestion and sore throat. Eyes: Positive for visual disturbance (Glasses/contacts). Respiratory: Negative for cough, shortness of breath and wheezing. Cardiovascular: Positive for leg swelling. Negative for chest pain and palpitations. Gastrointestinal: Positive for abdominal pain. Negative for constipation, diarrhea, nausea and vomiting. Genitourinary: Positive for pelvic pain. Negative for dysuria, frequency, hematuria and urgency. Musculoskeletal: Positive for back pain. Skin: Negative for rash and wound. Neurological: Positive for headaches. Negative for dizziness, speech difficulty and light-headedness. Hematological: Bruises/bleeds easily. Psychiatric/Behavioral: Negative. GENERAL PHYSICAL EXAM     Vitals: BP (!) 138/99   Pulse 98   Temp 97.8 °F (36.6 °C) (Infrared)   Resp 16   Ht 5' 1\" (1.549 m)   Wt 253 lb (114.8 kg)   LMP 02/11/2022 (Approximate)   SpO2 100%   BMI 47.80 kg/m²  Body mass index is 47.8 kg/m². Physical Exam  Constitutional:       General: She is not in acute distress. Appearance: She is well-developed. She is obese.  She is not ill-appearing, toxic-appearing or diaphoretic. HENT:      Head: Normocephalic and atraumatic. Nose: Nose normal. No congestion. Mouth/Throat:      Mouth: Mucous membranes are moist.      Pharynx: Oropharynx is clear. No oropharyngeal exudate or posterior oropharyngeal erythema. Eyes:      General: No scleral icterus. Right eye: No discharge. Left eye: No discharge. Conjunctiva/sclera: Conjunctivae normal.      Pupils: Pupils are equal, round, and reactive to light. Neck:      Trachea: No tracheal deviation. Cardiovascular:      Rate and Rhythm: Normal rate and regular rhythm. Heart sounds: Normal heart sounds. No murmur heard. No friction rub. No gallop. Pulmonary:      Effort: Pulmonary effort is normal. No respiratory distress. Breath sounds: Normal breath sounds. No wheezing, rhonchi or rales. Abdominal:      General: Bowel sounds are normal. There is no distension. Palpations: Abdomen is soft. Tenderness: There is no abdominal tenderness. There is no guarding. Musculoskeletal:         General: No swelling or tenderness. Cervical back: Neck supple. No tenderness. Right lower leg: Edema (Trace, has compression stockings on) present. Left lower leg: Edema (Trace, has compression stockings on ) present. Skin:     General: Skin is warm and dry. Coloration: Skin is not jaundiced. Findings: No bruising, erythema or rash. Neurological:      General: No focal deficit present. Mental Status: She is alert and oriented to person, place, and time. Cranial Nerves: No cranial nerve deficit.       Gait: Gait normal.   Psychiatric:         Mood and Affect: Mood normal.        PROVISIONAL DIAGNOSES / SURGERY:      UTERINE FIBROIDS, IRREGULAR BLEEDING     HYSTERECTOMY ABDOMINAL LAPAROSCOPIC ROBOTIC XI    Patient Active Problem List    Diagnosis Date Noted    Masses of both breasts 11/02/2021    Acute vaginitis 10/04/2021    Dysmenorrhea 10/04/2021    Fibroid 10/04/2021    Breast pain 10/04/2021    Intramural and submucous leiomyoma of uterus 03/12/2021    Elevated blood pressure reading 02/24/2021    Excessive bleeding in premenopausal period 02/24/2021    Pelvic pain in female 02/24/2021    S/P laparoscopic cholecystectomy 00/29/4234    Biliary colic 88/27/8447    Hiatal hernia with GERD without esophagitis     Primary osteoarthritis of both hips 04/12/2017    Tendonitis involving hip abductors 04/12/2017    DDD (degenerative disc disease), lumbar 05/13/2015    Trochanteric bursitis 05/13/2015    Vitamin D deficiency 05/13/2015    Migraine headache 03/25/2013         Total time spent on encounter- PAT provider minutes: 31-40 minutes      ADRYAN Bhatti CNP on 2/17/2022 at 11:52 AM

## 2022-02-18 LAB
EKG ATRIAL RATE: 84 BPM
EKG P AXIS: 51 DEGREES
EKG P-R INTERVAL: 150 MS
EKG Q-T INTERVAL: 376 MS
EKG QRS DURATION: 70 MS
EKG QTC CALCULATION (BAZETT): 444 MS
EKG R AXIS: 17 DEGREES
EKG T AXIS: 20 DEGREES
EKG VENTRICULAR RATE: 84 BPM

## 2022-02-18 PROCEDURE — 93010 ELECTROCARDIOGRAM REPORT: CPT | Performed by: INTERNAL MEDICINE

## 2022-03-02 ENCOUNTER — ANESTHESIA EVENT (OUTPATIENT)
Dept: OPERATING ROOM | Age: 42
DRG: 519 | End: 2022-03-02
Payer: MEDICARE

## 2022-03-02 NOTE — H&P
OB/GYN Pre-Op H&P  Alvin Funez    Patient Name: Cosme Cook     Patient : 1980  Room/Bed: [facility] OR Pool/NONE  Admission Date/Time: 3/3/2022  5:43 AM  Primary Care Physician: Myra Nguyen  MRN: [id number]    Date: 3/3/2022  Time: 6:58 AM    The patient was seen in pre-op holding. She is here for previously scheduled robotic assisted laparoscopic abdominal hysterectomy. She has a history of irregular, heavy menstrual cycles. She also endorses dysmenorrhea and dyspareunia. She also had uterine fibroids diagnosed in . A TVUS in 2021 demonstrated a [id number] fibroid. She wishes to proceed with surgical management. The procedure risks and complications were reviewed. The labs, Consent, and H&P were reviewed and updated. The patient was counseled on the possibility of  the need of a second surgery. The patient voiced understanding and had all of her questions answered. The possibility of incomplete removal of abnormal tissue was discussed. OBSTETRICAL HISTORY:   OB History    Para Term  AB Living   5 2 2 0 1 2   SAB IAB Ectopic Molar Multiple Live Births   0 0 0 0 0 2      # Outcome Date GA Lbr Adam/2nd Weight Sex Delivery Anes PTL Lv   5 Term     F Vag-Spont   GABBIE   4 Term     F Vag-Spont   GABBIE   3             2             1 AB                PAST MEDICAL HISTORY:   has a past medical history of Arthritis, Bursitis, Diabetes mellitus ([address] Fibroid tumor, Fibromyalgia, GERD (gastroesophageal reflux disease), Hypertension, Migraine, Osteoarthritis, Prolonged emergence from general anesthesia, Renal failure, Stroke (cerebrum) ([address] and Tendonitis. PAST SURGICAL HISTORY:   has a past surgical history that includes cyst removal; Tubal ligation; [place] tooth extraction; Upper gastrointestinal endoscopy (2018); Cholecystectomy, laparoscopic (N/A, 2020);  Upper gastrointestinal endoscopy (N/A, 3/5/2021); and Colonoscopy (N/A, 3/5/2021). ALLERGIES:  Allergies as of 02/11/2022 - Fully Reviewed 02/11/2022   Allergen Reaction Noted    Latex Itching 04/23/2020    Vicodin [hydrocodone-acetaminophen] Hives 11/02/2018    Bactrim [sulfamethoxazole-trimethoprim]  08/24/2018    Codeine  08/24/2018    Ibuprofen  08/24/2018    Sulfa antibiotics  08/24/2018       MEDICATIONS:  Current Facility-Administered Medications   Medication Dose Route Frequency Provider Last Rate Last Admin    lidocaine PF 1 % injection 1 mL  1 mL IntraDERmal Once PRN Meryle Rice, MD        lactated ringers infusion   IntraVENous Continuous Meryle Rice,  mL/hr at 03/03/22 0639 New Bag at 03/03/22 0639    sodium chloride flush 0.9 % injection 5-40 mL  5-40 mL IntraVENous 2 times per day Meryle Rice, MD        sodium chloride flush 0.9 % injection 5-40 mL  5-40 mL IntraVENous PRN Meryle Rice, MD        0.9 % sodium chloride infusion  25 mL IntraVENous PRN Meryle Rice, MD        lactated ringers infusion   IntraVENous Continuous Meryle Rice,  mL/hr at 03/03/22 0640 New Bag at 03/03/22 0640    famotidine (PEPCID) injection 20 mg  20 mg IntraVENous Once Audie De La O MD           FAMILY HISTORY:  family history includes Arthritis in her father and mother; Breast Cancer in her maternal aunt and maternal aunt; Mental Illness in her mother. SOCIAL HISTORY:   reports that she has quit smoking. She has never used smokeless tobacco. She reports current alcohol use. She reports that she does not use drugs.     VITALS:  Vitals:    03/03/22 0610   BP: 115/67   Pulse: 72   Resp: 18   Temp: 97.3 °F (36.3 °C)   TempSrc: Infrared   SpO2: 98%   Weight: 253 lb (114.8 kg)   Height: 5' 1\" (1.549 m)                                                                                                                               PHYSICAL EXAM:     Unchanged from Prior H&P  CONSTITUTIONAL:  Alert and oriented, no acute distress  HEAD: normocephalic, atraumatic  EYES: Pupils equal and reactive to light, Extraocular muscles intact, sclera non icteric  ENT: Mucus membranes moist, No otorrhea, no rhinorrhea  NECK:  supple, symmetrical, trachea midline   LUNGS:  Good air movement bilaterally, unlabored respirations, no wheezes or rhonchi  CARDIOVASCULAR: Regular rate and rhythm, no murmurs rubs or gallops  ABDOMEN: soft, non tender, non distended, no rebound or guarding, no hernias, no hepatomegaly, no splenomegly  MUSCULOSKELETAL:  Equal strength bilaterally, normal muscle tone  SKIN: No abscess or rash  NEUROLOGIC:  Cranial nerves 2-12 grossly intact, no focal deficits  PSYCH: affect appropriate  Pelvic Exam: deferred to OR      LAB RESULTS:  Admission on 03/03/2022   Component Date Value Ref Range Status    POC Glucose 03/03/2022 108* 65 - 105 mg/dL Final   Hospital Outpatient Visit on 02/17/2022   Component Date Value Ref Range Status    Glucose 02/17/2022 102* 70 - 99 mg/dL Final    BUN 02/17/2022 12  6 - 20 mg/dL Final    CREATININE 02/17/2022 1.10* 0.50 - 0.90 mg/dL Final    Bun/Cre Ratio 02/17/2022 NOT REPORTED  9 - 20 Final    Calcium 02/17/2022 9.5  8.6 - 10.4 mg/dL Final    Sodium 02/17/2022 137  135 - 144 mmol/L Final    Potassium 02/17/2022 4.2  3.7 - 5.3 mmol/L Final    Chloride 02/17/2022 100  98 - 107 mmol/L Final    CO2 02/17/2022 26  20 - 31 mmol/L Final    Anion Gap 02/17/2022 11  9 - 17 mmol/L Final    GFR Non- 02/17/2022 55* >60 mL/min Final    GFR  02/17/2022 >60  >60 mL/min Final    GFR Comment 02/17/2022        Final    Comment: Average GFR for 30-36 years old:   107 mL/min/1.73sq m  Chronic Kidney Disease:   <60 mL/min/1.73sq m  Kidney failure:   <15 mL/min/1.73sq m              eGFR calculated using average adult body mass.  Additional eGFR calculator available at:        [employer]            GFR Staging 02/17/2022 NOT REPORTED   Final    WBC 02/17/2022 6.2  3.5 - 11.0 k/uL Final    RBC 02/17/2022 4.50  4.0 - 5.2 m/uL Final    Hemoglobin 02/17/2022 11.3* 12.0 - 16.0 g/dL Final    Hematocrit 02/17/2022 34.9* 36 - 46 % Final    MCV 02/17/2022 77.5* 80 - 100 fL Final    MCH 02/17/2022 25.1* 26 - 34 pg Final    MCHC 02/17/2022 32.3  31 - 37 g/dL Final    RDW 02/17/2022 17.2* 11.5 - 14.9 % Final    Platelets 46/32/8195 414  150 - 450 k/uL Final    MPV 02/17/2022 7.8  6.0 - 12.0 fL Final    NRBC Automated 02/17/2022 NOT REPORTED  per 100 WBC Final    Differential Type 02/17/2022 NOT REPORTED   Final    Immature Granulocytes 02/17/2022 NOT REPORTED  0 % Final    Absolute Immature Granulocyte 02/17/2022 NOT REPORTED  0.00 - 0.30 k/uL Final    WBC Morphology 02/17/2022 NOT REPORTED   Final    RBC Morphology 02/17/2022 NOT REPORTED   Final    Platelet Estimate 95/14/3123 NOT REPORTED   Final    Seg Neutrophils 02/17/2022 58  36 - 66 % Final    Lymphocytes 02/17/2022 32  24 - 44 % Final    Monocytes 02/17/2022 6  1 - 7 % Final    Eosinophils % 02/17/2022 4  0 - 4 % Final    Basophils 02/17/2022 0  0 - 2 % Final    Segs Absolute 02/17/2022 3.60  1.3 - 9.1 k/uL Final    Absolute Lymph # 02/17/2022 1.98  1.0 - 4.8 k/uL Final    Absolute Mono # 02/17/2022 0.37  0.1 - 1.3 k/uL Final    Absolute Eos # 02/17/2022 0.25  0.0 - 0.4 k/uL Final    Basophils Absolute 02/17/2022 0.00  0.0 - 0.2 k/uL Final    Morphology 02/17/2022 FEW GIANT PLATELETS   Final    Morphology 02/17/2022 HYPOCHROMIA PRESENT   Final    Morphology 02/17/2022 ANISOCYTOSIS PRESENT   Final    Ventricular Rate 02/17/2022 84  BPM Final    Atrial Rate 02/17/2022 84  BPM Final    P-R Interval 02/17/2022 150  ms Final    QRS Duration 02/17/2022 70  ms Final    Q-T Interval 02/17/2022 376  ms Final    QTc Calculation (Bazett) 02/17/2022 444  ms Final    P Axis 02/17/2022 51  degrees Final    R Axis 02/17/2022 17  degrees Final    T Axis 02/17/2022 20  degrees Final    Expiration Date 02/17/2022 03/06/2022,2359   Final    Arm Band Number 02/17/2022 [id number]   Final    ABO/Rh 02/17/2022 O POSITIVE   Final    Antibody Screen 02/17/2022 NEGATIVE   Final    Blood Bank Comment 02/17/2022    Final                    Value:PT'S ABORH CONFIRMED O POS:004  Results Verified         DIAGNOSTICS:    TVUS 3/3/21: Uterus measures 10 x 8 x 6 cm; Endometrial thickness 1.2cm; Uterine fibroid 5 x 5 x 5 cm    Pap 10/4/21: Negative for intraepithelial lesions; +Other High Risk HPV    DIAGNOSIS & PLAN:  1. Uterine fibroid  2. Menorrhagia  3. Dysmenorrhea    - Proceed with planned procedure: Robotic Assisted Laparoscopic Abdominal Hysterectomy   - Consent signed, on chart.   - COVID vaccinated   - The patient is ready for transport to the operative suite. Counseling: The patient was counseled on all options both medical and surgical, conservative as well as definitive. She has elected to proceed with the procedure as stated above. The patient was counseled on the procedure. Risks and complications were reviewed in detail. The patients orders, labs, consents have been completed. The history and physical as well as all supporting surgical documentation will be forwarded to the pre-operative holding area. The patient is aware that this procedure may not alleviate her symptoms. That there may be a necessity for a second surgery and that there may be an incomplete removal of abnormal tissue.     Morteza Douglas DO  Ob/Gyn Resident   [facility], [place]  3/3/2022, 6:58 AM

## 2022-03-03 ENCOUNTER — ANESTHESIA (OUTPATIENT)
Dept: OPERATING ROOM | Age: 42
DRG: 519 | End: 2022-03-03
Payer: MEDICARE

## 2022-03-03 ENCOUNTER — HOSPITAL ENCOUNTER (INPATIENT)
Age: 42
LOS: 1 days | Discharge: HOME | DRG: 519 | End: 2022-03-04
Attending: SPECIALIST | Admitting: SPECIALIST
Payer: MEDICARE

## 2022-03-03 VITALS — TEMPERATURE: 94.3 F | OXYGEN SATURATION: 100 % | SYSTOLIC BLOOD PRESSURE: 154 MMHG | DIASTOLIC BLOOD PRESSURE: 71 MMHG

## 2022-03-03 DIAGNOSIS — Z98.890 POST-OPERATIVE STATE: Primary | ICD-10-CM

## 2022-03-03 LAB
BILIRUBIN URINE: NEGATIVE
COLOR: YELLOW
COMMENT UA: NORMAL
GLUCOSE BLD-MCNC: 104 MG/DL (ref 65–105)
GLUCOSE BLD-MCNC: 108 MG/DL (ref 65–105)
GLUCOSE BLD-MCNC: 126 MG/DL (ref 65–105)
GLUCOSE BLD-MCNC: 134 MG/DL (ref 65–105)
GLUCOSE BLD-MCNC: 171 MG/DL (ref 65–105)
GLUCOSE URINE: NEGATIVE
HCG, PREGNANCY URINE (POC): NEGATIVE
KETONES, URINE: NEGATIVE
LEUKOCYTE ESTERASE, URINE: NEGATIVE
NITRITE, URINE: NEGATIVE
PH UA: 5 (ref 5–8)
PROTEIN UA: NEGATIVE
SPECIFIC GRAVITY UA: 1.02 (ref 1–1.03)
TURBIDITY: CLEAR
URINE HGB: NEGATIVE
UROBILINOGEN, URINE: NORMAL

## 2022-03-03 PROCEDURE — S2900 ROBOTIC SURGICAL SYSTEM: HCPCS | Performed by: SPECIALIST

## 2022-03-03 PROCEDURE — 2580000003 HC RX 258: Performed by: ANESTHESIOLOGY

## 2022-03-03 PROCEDURE — 6360000002 HC RX W HCPCS: Performed by: NURSE ANESTHETIST, CERTIFIED REGISTERED

## 2022-03-03 PROCEDURE — 2500000003 HC RX 250 WO HCPCS: Performed by: NURSE ANESTHETIST, CERTIFIED REGISTERED

## 2022-03-03 PROCEDURE — 6370000000 HC RX 637 (ALT 250 FOR IP): Performed by: STUDENT IN AN ORGANIZED HEALTH CARE EDUCATION/TRAINING PROGRAM

## 2022-03-03 PROCEDURE — 88307 TISSUE EXAM BY PATHOLOGIST: CPT

## 2022-03-03 PROCEDURE — 82947 ASSAY GLUCOSE BLOOD QUANT: CPT

## 2022-03-03 PROCEDURE — 81003 URINALYSIS AUTO W/O SCOPE: CPT

## 2022-03-03 PROCEDURE — 8E0W4CZ ROBOTIC ASSISTED PROCEDURE OF TRUNK REGION, PERCUTANEOUS ENDOSCOPIC APPROACH: ICD-10-PCS | Performed by: SPECIALIST

## 2022-03-03 PROCEDURE — 2709999900 HC NON-CHARGEABLE SUPPLY: Performed by: SPECIALIST

## 2022-03-03 PROCEDURE — 6360000002 HC RX W HCPCS: Performed by: ANESTHESIOLOGY

## 2022-03-03 PROCEDURE — 3600000019 HC SURGERY ROBOT ADDTL 15MIN: Performed by: SPECIALIST

## 2022-03-03 PROCEDURE — 7100000001 HC PACU RECOVERY - ADDTL 15 MIN: Performed by: SPECIALIST

## 2022-03-03 PROCEDURE — 2580000003 HC RX 258: Performed by: STUDENT IN AN ORGANIZED HEALTH CARE EDUCATION/TRAINING PROGRAM

## 2022-03-03 PROCEDURE — 7100000000 HC PACU RECOVERY - FIRST 15 MIN: Performed by: SPECIALIST

## 2022-03-03 PROCEDURE — 3600000009 HC SURGERY ROBOT BASE: Performed by: SPECIALIST

## 2022-03-03 PROCEDURE — 3700000001 HC ADD 15 MINUTES (ANESTHESIA): Performed by: SPECIALIST

## 2022-03-03 PROCEDURE — 0UT9FZZ RESECTION OF UTERUS, VIA NATURAL OR ARTIFICIAL OPENING WITH PERCUTANEOUS ENDOSCOPIC ASSISTANCE: ICD-10-PCS | Performed by: SPECIALIST

## 2022-03-03 PROCEDURE — 1220000000 HC SEMI PRIVATE OB R&B

## 2022-03-03 PROCEDURE — 6360000002 HC RX W HCPCS: Performed by: STUDENT IN AN ORGANIZED HEALTH CARE EDUCATION/TRAINING PROGRAM

## 2022-03-03 PROCEDURE — 3700000000 HC ANESTHESIA ATTENDED CARE: Performed by: SPECIALIST

## 2022-03-03 PROCEDURE — 0UT7FZZ RESECTION OF BILATERAL FALLOPIAN TUBES, VIA NATURAL OR ARTIFICIAL OPENING WITH PERCUTANEOUS ENDOSCOPIC ASSISTANCE: ICD-10-PCS | Performed by: SPECIALIST

## 2022-03-03 PROCEDURE — 2500000003 HC RX 250 WO HCPCS: Performed by: SPECIALIST

## 2022-03-03 PROCEDURE — 0TJB8ZZ INSPECTION OF BLADDER, VIA NATURAL OR ARTIFICIAL OPENING ENDOSCOPIC: ICD-10-PCS | Performed by: SPECIALIST

## 2022-03-03 PROCEDURE — 58571 TLH W/T/O 250 G OR LESS: CPT | Performed by: SPECIALIST

## 2022-03-03 PROCEDURE — 2500000003 HC RX 250 WO HCPCS: Performed by: ANESTHESIOLOGY

## 2022-03-03 PROCEDURE — 81025 URINE PREGNANCY TEST: CPT

## 2022-03-03 RX ORDER — PROPOFOL 10 MG/ML
INJECTION, EMULSION INTRAVENOUS PRN
Status: DISCONTINUED | OUTPATIENT
Start: 2022-03-03 | End: 2022-03-03 | Stop reason: SDUPTHER

## 2022-03-03 RX ORDER — KETOROLAC TROMETHAMINE 30 MG/ML
30 INJECTION, SOLUTION INTRAMUSCULAR; INTRAVENOUS EVERY 6 HOURS
Status: DISPENSED | OUTPATIENT
Start: 2022-03-03 | End: 2022-03-04

## 2022-03-03 RX ORDER — SUCCINYLCHOLINE/SOD CL,ISO/PF 200MG/10ML
SYRINGE (ML) INTRAVENOUS PRN
Status: DISCONTINUED | OUTPATIENT
Start: 2022-03-03 | End: 2022-03-03 | Stop reason: SDUPTHER

## 2022-03-03 RX ORDER — OXYCODONE HYDROCHLORIDE 5 MG/1
5 TABLET ORAL EVERY 6 HOURS PRN
Qty: 12 TABLET | Refills: 0 | Status: SHIPPED | OUTPATIENT
Start: 2022-03-03 | End: 2022-03-08

## 2022-03-03 RX ORDER — BUPIVACAINE HYDROCHLORIDE 5 MG/ML
INJECTION, SOLUTION EPIDURAL; INTRACAUDAL PRN
Status: DISCONTINUED | OUTPATIENT
Start: 2022-03-03 | End: 2022-03-03 | Stop reason: ALTCHOICE

## 2022-03-03 RX ORDER — DOCUSATE SODIUM 100 MG/1
100 CAPSULE, LIQUID FILLED ORAL 2 TIMES DAILY
Qty: 60 CAPSULE | Refills: 0 | Status: SHIPPED | OUTPATIENT
Start: 2022-03-03 | End: 2022-04-02

## 2022-03-03 RX ORDER — SODIUM CHLORIDE 0.9 % (FLUSH) 0.9 %
5-40 SYRINGE (ML) INJECTION PRN
Status: DISCONTINUED | OUTPATIENT
Start: 2022-03-03 | End: 2022-03-04 | Stop reason: HOSPADM

## 2022-03-03 RX ORDER — SODIUM CHLORIDE 9 MG/ML
25 INJECTION, SOLUTION INTRAVENOUS PRN
Status: DISCONTINUED | OUTPATIENT
Start: 2022-03-03 | End: 2022-03-03 | Stop reason: HOSPADM

## 2022-03-03 RX ORDER — DEXTROSE MONOHYDRATE 25 G/50ML
12.5 INJECTION, SOLUTION INTRAVENOUS PRN
Status: DISCONTINUED | OUTPATIENT
Start: 2022-03-03 | End: 2022-03-04 | Stop reason: HOSPADM

## 2022-03-03 RX ORDER — NICOTINE POLACRILEX 4 MG
15 LOZENGE BUCCAL PRN
Status: DISCONTINUED | OUTPATIENT
Start: 2022-03-03 | End: 2022-03-04 | Stop reason: HOSPADM

## 2022-03-03 RX ORDER — SODIUM CHLORIDE 0.9 % (FLUSH) 0.9 %
5-40 SYRINGE (ML) INJECTION EVERY 12 HOURS SCHEDULED
Status: DISCONTINUED | OUTPATIENT
Start: 2022-03-03 | End: 2022-03-03 | Stop reason: HOSPADM

## 2022-03-03 RX ORDER — SODIUM CHLORIDE 0.9 % (FLUSH) 0.9 %
5-40 SYRINGE (ML) INJECTION EVERY 12 HOURS SCHEDULED
Status: DISCONTINUED | OUTPATIENT
Start: 2022-03-03 | End: 2022-03-04 | Stop reason: HOSPADM

## 2022-03-03 RX ORDER — DEXTROSE MONOHYDRATE 50 MG/ML
100 INJECTION, SOLUTION INTRAVENOUS PRN
Status: DISCONTINUED | OUTPATIENT
Start: 2022-03-03 | End: 2022-03-04 | Stop reason: HOSPADM

## 2022-03-03 RX ORDER — SODIUM CHLORIDE 9 MG/ML
INJECTION, SOLUTION INTRAVENOUS CONTINUOUS
Status: DISCONTINUED | OUTPATIENT
Start: 2022-03-03 | End: 2022-03-03

## 2022-03-03 RX ORDER — PANTOPRAZOLE SODIUM 20 MG/1
20 TABLET, DELAYED RELEASE ORAL
Status: DISCONTINUED | OUTPATIENT
Start: 2022-03-04 | End: 2022-03-04 | Stop reason: HOSPADM

## 2022-03-03 RX ORDER — SODIUM CHLORIDE 0.9 % (FLUSH) 0.9 %
5-40 SYRINGE (ML) INJECTION PRN
Status: DISCONTINUED | OUTPATIENT
Start: 2022-03-03 | End: 2022-03-03 | Stop reason: HOSPADM

## 2022-03-03 RX ORDER — ONDANSETRON 2 MG/ML
INJECTION INTRAMUSCULAR; INTRAVENOUS PRN
Status: DISCONTINUED | OUTPATIENT
Start: 2022-03-03 | End: 2022-03-03 | Stop reason: SDUPTHER

## 2022-03-03 RX ORDER — SODIUM CHLORIDE, SODIUM LACTATE, POTASSIUM CHLORIDE, CALCIUM CHLORIDE 600; 310; 30; 20 MG/100ML; MG/100ML; MG/100ML; MG/100ML
INJECTION, SOLUTION INTRAVENOUS CONTINUOUS
Status: DISCONTINUED | OUTPATIENT
Start: 2022-03-03 | End: 2022-03-03

## 2022-03-03 RX ORDER — OXYCODONE HYDROCHLORIDE 5 MG/1
10 TABLET ORAL EVERY 4 HOURS PRN
Status: DISCONTINUED | OUTPATIENT
Start: 2022-03-03 | End: 2022-03-04 | Stop reason: HOSPADM

## 2022-03-03 RX ORDER — SODIUM CHLORIDE 9 MG/ML
25 INJECTION, SOLUTION INTRAVENOUS PRN
Status: DISCONTINUED | OUTPATIENT
Start: 2022-03-03 | End: 2022-03-04 | Stop reason: HOSPADM

## 2022-03-03 RX ORDER — ROCURONIUM BROMIDE 10 MG/ML
INJECTION, SOLUTION INTRAVENOUS PRN
Status: DISCONTINUED | OUTPATIENT
Start: 2022-03-03 | End: 2022-03-03 | Stop reason: SDUPTHER

## 2022-03-03 RX ORDER — FENTANYL CITRATE 50 UG/ML
INJECTION, SOLUTION INTRAMUSCULAR; INTRAVENOUS PRN
Status: DISCONTINUED | OUTPATIENT
Start: 2022-03-03 | End: 2022-03-03 | Stop reason: SDUPTHER

## 2022-03-03 RX ORDER — OXYCODONE HYDROCHLORIDE 5 MG/1
5 TABLET ORAL EVERY 4 HOURS PRN
Status: DISCONTINUED | OUTPATIENT
Start: 2022-03-03 | End: 2022-03-04 | Stop reason: HOSPADM

## 2022-03-03 RX ORDER — ACETAMINOPHEN 500 MG
1000 TABLET ORAL EVERY 6 HOURS PRN
Status: DISCONTINUED | OUTPATIENT
Start: 2022-03-03 | End: 2022-03-04 | Stop reason: HOSPADM

## 2022-03-03 RX ORDER — SIMETHICONE 80 MG
80 TABLET,CHEWABLE ORAL 4 TIMES DAILY PRN
Qty: 60 TABLET | Refills: 1 | Status: SHIPPED | OUTPATIENT
Start: 2022-03-03

## 2022-03-03 RX ORDER — ONDANSETRON 4 MG/1
4 TABLET, ORALLY DISINTEGRATING ORAL EVERY 8 HOURS PRN
Status: DISCONTINUED | OUTPATIENT
Start: 2022-03-03 | End: 2022-03-04 | Stop reason: HOSPADM

## 2022-03-03 RX ORDER — ONDANSETRON 4 MG/1
4 TABLET, ORALLY DISINTEGRATING ORAL EVERY 8 HOURS PRN
Qty: 15 TABLET | Refills: 1 | Status: SHIPPED | OUTPATIENT
Start: 2022-03-03

## 2022-03-03 RX ORDER — ONDANSETRON 2 MG/ML
4 INJECTION INTRAMUSCULAR; INTRAVENOUS EVERY 6 HOURS PRN
Status: DISCONTINUED | OUTPATIENT
Start: 2022-03-03 | End: 2022-03-04 | Stop reason: HOSPADM

## 2022-03-03 RX ORDER — LIDOCAINE HYDROCHLORIDE 20 MG/ML
INJECTION, SOLUTION EPIDURAL; INFILTRATION; INTRACAUDAL; PERINEURAL PRN
Status: DISCONTINUED | OUTPATIENT
Start: 2022-03-03 | End: 2022-03-03 | Stop reason: SDUPTHER

## 2022-03-03 RX ORDER — ONDANSETRON 2 MG/ML
4 INJECTION INTRAMUSCULAR; INTRAVENOUS
Status: DISCONTINUED | OUTPATIENT
Start: 2022-03-03 | End: 2022-03-03 | Stop reason: HOSPADM

## 2022-03-03 RX ORDER — CEFAZOLIN SODIUM 1 G/3ML
INJECTION, POWDER, FOR SOLUTION INTRAMUSCULAR; INTRAVENOUS PRN
Status: DISCONTINUED | OUTPATIENT
Start: 2022-03-03 | End: 2022-03-03 | Stop reason: SDUPTHER

## 2022-03-03 RX ORDER — ACETAMINOPHEN 500 MG
1000 TABLET ORAL 3 TIMES DAILY PRN
Qty: 60 TABLET | Refills: 1 | Status: SHIPPED | OUTPATIENT
Start: 2022-03-03

## 2022-03-03 RX ORDER — DEXAMETHASONE SODIUM PHOSPHATE 4 MG/ML
INJECTION, SOLUTION INTRA-ARTICULAR; INTRALESIONAL; INTRAMUSCULAR; INTRAVENOUS; SOFT TISSUE PRN
Status: DISCONTINUED | OUTPATIENT
Start: 2022-03-03 | End: 2022-03-03 | Stop reason: SDUPTHER

## 2022-03-03 RX ORDER — MIDAZOLAM HYDROCHLORIDE 1 MG/ML
INJECTION INTRAMUSCULAR; INTRAVENOUS PRN
Status: DISCONTINUED | OUTPATIENT
Start: 2022-03-03 | End: 2022-03-03 | Stop reason: SDUPTHER

## 2022-03-03 RX ORDER — SENNA AND DOCUSATE SODIUM 50; 8.6 MG/1; MG/1
2 TABLET, FILM COATED ORAL 2 TIMES DAILY
Status: DISCONTINUED | OUTPATIENT
Start: 2022-03-03 | End: 2022-03-04 | Stop reason: HOSPADM

## 2022-03-03 RX ORDER — DIPHENHYDRAMINE HYDROCHLORIDE 50 MG/ML
12.5 INJECTION INTRAMUSCULAR; INTRAVENOUS
Status: DISCONTINUED | OUTPATIENT
Start: 2022-03-03 | End: 2022-03-03 | Stop reason: HOSPADM

## 2022-03-03 RX ORDER — LIDOCAINE HYDROCHLORIDE 10 MG/ML
1 INJECTION, SOLUTION EPIDURAL; INFILTRATION; INTRACAUDAL; PERINEURAL
Status: DISCONTINUED | OUTPATIENT
Start: 2022-03-03 | End: 2022-03-03 | Stop reason: HOSPADM

## 2022-03-03 RX ADMIN — KETOROLAC TROMETHAMINE 30 MG: 30 INJECTION, SOLUTION INTRAMUSCULAR at 19:14

## 2022-03-03 RX ADMIN — KETOROLAC TROMETHAMINE 30 MG: 30 INJECTION, SOLUTION INTRAMUSCULAR at 13:00

## 2022-03-03 RX ADMIN — ROCURONIUM BROMIDE 50 MG: 10 INJECTION, SOLUTION INTRAVENOUS at 07:36

## 2022-03-03 RX ADMIN — FENTANYL CITRATE 100 MCG: 50 INJECTION, SOLUTION INTRAMUSCULAR; INTRAVENOUS at 08:17

## 2022-03-03 RX ADMIN — INSULIN LISPRO 1 UNITS: 100 INJECTION, SOLUTION INTRAVENOUS; SUBCUTANEOUS at 16:54

## 2022-03-03 RX ADMIN — PROPOFOL 200 MG: 10 INJECTION, EMULSION INTRAVENOUS at 07:33

## 2022-03-03 RX ADMIN — SODIUM CHLORIDE, PRESERVATIVE FREE 10 ML: 5 INJECTION INTRAVENOUS at 19:14

## 2022-03-03 RX ADMIN — SODIUM CHLORIDE: 9 INJECTION, SOLUTION INTRAVENOUS at 14:09

## 2022-03-03 RX ADMIN — OXYCODONE HYDROCHLORIDE 10 MG: 5 TABLET ORAL at 14:45

## 2022-03-03 RX ADMIN — ONDANSETRON 4 MG: 2 INJECTION, SOLUTION INTRAMUSCULAR; INTRAVENOUS at 08:43

## 2022-03-03 RX ADMIN — LIDOCAINE HYDROCHLORIDE 100 MG: 20 INJECTION, SOLUTION EPIDURAL; INFILTRATION; INTRACAUDAL; PERINEURAL at 07:33

## 2022-03-03 RX ADMIN — HYDROMORPHONE HYDROCHLORIDE 0.5 MG: 1 INJECTION, SOLUTION INTRAMUSCULAR; INTRAVENOUS; SUBCUTANEOUS at 11:51

## 2022-03-03 RX ADMIN — SODIUM CHLORIDE, POTASSIUM CHLORIDE, SODIUM LACTATE AND CALCIUM CHLORIDE: 600; 310; 30; 20 INJECTION, SOLUTION INTRAVENOUS at 06:39

## 2022-03-03 RX ADMIN — SUGAMMADEX 200 MG: 100 INJECTION, SOLUTION INTRAVENOUS at 10:15

## 2022-03-03 RX ADMIN — HYDROMORPHONE HYDROCHLORIDE 0.5 MG: 1 INJECTION, SOLUTION INTRAMUSCULAR; INTRAVENOUS; SUBCUTANEOUS at 11:01

## 2022-03-03 RX ADMIN — OXYCODONE HYDROCHLORIDE 10 MG: 5 TABLET ORAL at 19:27

## 2022-03-03 RX ADMIN — SODIUM CHLORIDE, POTASSIUM CHLORIDE, SODIUM LACTATE AND CALCIUM CHLORIDE: 600; 310; 30; 20 INJECTION, SOLUTION INTRAVENOUS at 06:40

## 2022-03-03 RX ADMIN — HYDROMORPHONE HYDROCHLORIDE 0.5 MG: 1 INJECTION, SOLUTION INTRAMUSCULAR; INTRAVENOUS; SUBCUTANEOUS at 11:21

## 2022-03-03 RX ADMIN — ROCURONIUM BROMIDE 20 MG: 10 INJECTION, SOLUTION INTRAVENOUS at 08:22

## 2022-03-03 RX ADMIN — FAMOTIDINE 20 MG: 10 INJECTION, SOLUTION INTRAVENOUS at 07:05

## 2022-03-03 RX ADMIN — FENTANYL CITRATE 100 MCG: 50 INJECTION, SOLUTION INTRAMUSCULAR; INTRAVENOUS at 07:30

## 2022-03-03 RX ADMIN — DEXAMETHASONE SODIUM PHOSPHATE 8 MG: 4 INJECTION, SOLUTION INTRAMUSCULAR; INTRAVENOUS at 08:43

## 2022-03-03 RX ADMIN — MIDAZOLAM 2 MG: 1 INJECTION INTRAMUSCULAR; INTRAVENOUS at 07:30

## 2022-03-03 RX ADMIN — OXYCODONE HYDROCHLORIDE 10 MG: 5 TABLET ORAL at 23:47

## 2022-03-03 RX ADMIN — Medication 100 MG: at 07:33

## 2022-03-03 RX ADMIN — CEFAZOLIN 2000 MG: 1 INJECTION, POWDER, FOR SOLUTION INTRAMUSCULAR; INTRAVENOUS at 07:39

## 2022-03-03 RX ADMIN — SENNOSIDES AND DOCUSATE SODIUM 2 TABLET: 50; 8.6 TABLET ORAL at 20:57

## 2022-03-03 ASSESSMENT — PULMONARY FUNCTION TESTS
PIF_VALUE: 36
PIF_VALUE: 28
PIF_VALUE: 25
PIF_VALUE: 35
PIF_VALUE: 28
PIF_VALUE: 0
PIF_VALUE: 43
PIF_VALUE: 35
PIF_VALUE: 43
PIF_VALUE: 29
PIF_VALUE: 44
PIF_VALUE: 3
PIF_VALUE: 35
PIF_VALUE: 42
PIF_VALUE: 35
PIF_VALUE: 43
PIF_VALUE: 43
PIF_VALUE: 29
PIF_VALUE: 3
PIF_VALUE: 43
PIF_VALUE: 42
PIF_VALUE: 29
PIF_VALUE: 25
PIF_VALUE: 3
PIF_VALUE: 29
PIF_VALUE: 0
PIF_VALUE: 25
PIF_VALUE: 42
PIF_VALUE: 41
PIF_VALUE: 36
PIF_VALUE: 30
PIF_VALUE: 42
PIF_VALUE: 30
PIF_VALUE: 29
PIF_VALUE: 26
PIF_VALUE: 35
PIF_VALUE: 43
PIF_VALUE: 29
PIF_VALUE: 35
PIF_VALUE: 29
PIF_VALUE: 43
PIF_VALUE: 0
PIF_VALUE: 28
PIF_VALUE: 29
PIF_VALUE: 43
PIF_VALUE: 35
PIF_VALUE: 42
PIF_VALUE: 0
PIF_VALUE: 35
PIF_VALUE: 41
PIF_VALUE: 15
PIF_VALUE: 39
PIF_VALUE: 26
PIF_VALUE: 30
PIF_VALUE: 43
PIF_VALUE: 26
PIF_VALUE: 0
PIF_VALUE: 0
PIF_VALUE: 36
PIF_VALUE: 42
PIF_VALUE: 42
PIF_VALUE: 35
PIF_VALUE: 2
PIF_VALUE: 29
PIF_VALUE: 43
PIF_VALUE: 35
PIF_VALUE: 42
PIF_VALUE: 36
PIF_VALUE: 43
PIF_VALUE: 28
PIF_VALUE: 44
PIF_VALUE: 0
PIF_VALUE: 35
PIF_VALUE: 1
PIF_VALUE: 41
PIF_VALUE: 42
PIF_VALUE: 28
PIF_VALUE: 1
PIF_VALUE: 43
PIF_VALUE: 35
PIF_VALUE: 42
PIF_VALUE: 35
PIF_VALUE: 26
PIF_VALUE: 43
PIF_VALUE: 0
PIF_VALUE: 28
PIF_VALUE: 43
PIF_VALUE: 37
PIF_VALUE: 28
PIF_VALUE: 36
PIF_VALUE: 43
PIF_VALUE: 42
PIF_VALUE: 36
PIF_VALUE: 42
PIF_VALUE: 43
PIF_VALUE: 29
PIF_VALUE: 43
PIF_VALUE: 38
PIF_VALUE: 35
PIF_VALUE: 24
PIF_VALUE: 43
PIF_VALUE: 41
PIF_VALUE: 43
PIF_VALUE: 41
PIF_VALUE: 44
PIF_VALUE: 41
PIF_VALUE: 25
PIF_VALUE: 28
PIF_VALUE: 30
PIF_VALUE: 35
PIF_VALUE: 43
PIF_VALUE: 0
PIF_VALUE: 29
PIF_VALUE: 29
PIF_VALUE: 26
PIF_VALUE: 45
PIF_VALUE: 36
PIF_VALUE: 43
PIF_VALUE: 28
PIF_VALUE: 29
PIF_VALUE: 43
PIF_VALUE: 29
PIF_VALUE: 35
PIF_VALUE: 43
PIF_VALUE: 29
PIF_VALUE: 36
PIF_VALUE: 28
PIF_VALUE: 37
PIF_VALUE: 21
PIF_VALUE: 35
PIF_VALUE: 39
PIF_VALUE: 43
PIF_VALUE: 25
PIF_VALUE: 41
PIF_VALUE: 41
PIF_VALUE: 29
PIF_VALUE: 42
PIF_VALUE: 35
PIF_VALUE: 28
PIF_VALUE: 35
PIF_VALUE: 0
PIF_VALUE: 30
PIF_VALUE: 41
PIF_VALUE: 17
PIF_VALUE: 38
PIF_VALUE: 29
PIF_VALUE: 29
PIF_VALUE: 19
PIF_VALUE: 4
PIF_VALUE: 43
PIF_VALUE: 40
PIF_VALUE: 43
PIF_VALUE: 0
PIF_VALUE: 35
PIF_VALUE: 35
PIF_VALUE: 28
PIF_VALUE: 19
PIF_VALUE: 37
PIF_VALUE: 42
PIF_VALUE: 11
PIF_VALUE: 43
PIF_VALUE: 42
PIF_VALUE: 25
PIF_VALUE: 41
PIF_VALUE: 19
PIF_VALUE: 43
PIF_VALUE: 14
PIF_VALUE: 2
PIF_VALUE: 42
PIF_VALUE: 41
PIF_VALUE: 42
PIF_VALUE: 29
PIF_VALUE: 29
PIF_VALUE: 32
PIF_VALUE: 24
PIF_VALUE: 42
PIF_VALUE: 30
PIF_VALUE: 35
PIF_VALUE: 43
PIF_VALUE: 41
PIF_VALUE: 0
PIF_VALUE: 42
PIF_VALUE: 41
PIF_VALUE: 41
PIF_VALUE: 29
PIF_VALUE: 28
PIF_VALUE: 0
PIF_VALUE: 41
PIF_VALUE: 29
PIF_VALUE: 42

## 2022-03-03 ASSESSMENT — PAIN DESCRIPTION - PAIN TYPE
TYPE: SURGICAL PAIN

## 2022-03-03 ASSESSMENT — PAIN DESCRIPTION - LOCATION
LOCATION: ABDOMEN

## 2022-03-03 ASSESSMENT — PAIN DESCRIPTION - DESCRIPTORS
DESCRIPTORS: ACHING
DESCRIPTORS: ACHING
DESCRIPTORS: OTHER (COMMENT)
DESCRIPTORS: CRAMPING
DESCRIPTORS: ACHING
DESCRIPTORS: SHARP

## 2022-03-03 ASSESSMENT — PAIN DESCRIPTION - PROGRESSION: CLINICAL_PROGRESSION: GRADUALLY IMPROVING

## 2022-03-03 ASSESSMENT — PAIN SCALES - GENERAL
PAINLEVEL_OUTOF10: 10
PAINLEVEL_OUTOF10: 9
PAINLEVEL_OUTOF10: 9
PAINLEVEL_OUTOF10: 7
PAINLEVEL_OUTOF10: 8
PAINLEVEL_OUTOF10: 10
PAINLEVEL_OUTOF10: 9
PAINLEVEL_OUTOF10: 0
PAINLEVEL_OUTOF10: 8
PAINLEVEL_OUTOF10: 10
PAINLEVEL_OUTOF10: 9

## 2022-03-03 ASSESSMENT — PAIN - FUNCTIONAL ASSESSMENT
PAIN_FUNCTIONAL_ASSESSMENT: 0-10
PAIN_FUNCTIONAL_ASSESSMENT: ACTIVITIES ARE NOT PREVENTED
PAIN_FUNCTIONAL_ASSESSMENT: ACTIVITIES ARE NOT PREVENTED

## 2022-03-03 ASSESSMENT — PAIN DESCRIPTION - ORIENTATION
ORIENTATION: ANTERIOR
ORIENTATION: ANTERIOR
ORIENTATION: LOWER
ORIENTATION: MID;LOWER;UPPER

## 2022-03-03 ASSESSMENT — PAIN DESCRIPTION - FREQUENCY: FREQUENCY: INTERMITTENT

## 2022-03-03 ASSESSMENT — PAIN DESCRIPTION - ONSET: ONSET: ON-GOING

## 2022-03-03 NOTE — PROGRESS NOTES
Obstetric/Gynecology Resident Interval Note    Patient discussed with RN. Was able to spontaneously void and is tolerating PO. Reports postop pain which is being managed with PO medication at this time. Vitals:    03/03/22 1230 03/03/22 1308 03/03/22 1309 03/03/22 1339   BP: (!) 133/58  117/69 (!) 120/58   Pulse: 103  96 96   Resp: 18      Temp: 97.7 °F (36.5 °C)      TempSrc: Infrared      SpO2:  97%     Weight:       Height:            Plan for overnight observation with AM labs ordered. Will hold metformin and provide insulin sliding scale for blood sugar management overnight.     Catracho Villalba DO  OB/GYN Resident, PGY3  [facility] [place]  3/3/2022, 6:32 PM

## 2022-03-03 NOTE — ANESTHESIA POSTPROCEDURE EVALUATION
Department of Anesthesiology  Postprocedure Note    Patient: Zhang White  MRN: [id number]  YOB: 1980  Date of evaluation: 3/3/2022  Time:  4:36 PM     Procedure Summary     Date: 03/03/22 Room / Location: [address] [place] [address] Saira Buchanan 10 / [facility]: VIELKA ROSA    Anesthesia Start: 0730 Anesthesia Stop: 4285    Procedure: HYSTERECTOMY ABDOMINAL LAPAROSCOPIC ROBOTIC, BILATERAL SALPINGECTOMY XI, CYSTOSCOPY (N/A Abdomen) Diagnosis: (UTERINE FIBROIDS, IRREGULAR BLEEDING    ( PT HAS HAD COVID VACCINE))    Surgeons: Aarti Gonsales MD Responsible Provider: Anastasia Miller MD    Anesthesia Type: general ASA Status: 3          Anesthesia Type: general    Goyo Phase I: Goyo Score: 10    Goyo Phase II:      Last vitals: Reviewed and per EMR flowsheets.        Anesthesia Post Evaluation    Comments: POST- ANESTHESIA EVALUATION       Pt Name: Zhang White  MRN: [id number]  YOB: 1980  Date of evaluation: 3/3/2022  Time:  4:36 PM      BP (!) 120/58   Pulse 96   Temp 97.7 °F (36.5 °C) (Infrared)   Resp 18   Ht 5' 1\" (1.549 m)   Wt 253 lb (114.8 kg)   LMP 02/11/2022 (Approximate)   SpO2 97%   BMI 47.80 kg/m²      Consciousness Level  Awake  Cardiopulmonary Status  Stable  Pain Adequately Treated YES  Nausea / Vomiting  NO  Adequate Hydration  YES  Anesthesia Related Complications NONE      Electronically signed by Anastasia Miller MD on 3/3/2022 at 4:36 PM

## 2022-03-03 NOTE — INTERVAL H&P NOTE
Update History & Physical    The patient's History and Physical of February 17, 2022 was reviewed with the patient and I examined the patient. There was no change. The surgical site was confirmed by the patient and me. Patient will be having : HYSTERECTOMY ABDOMINAL LAPAROSCOPIC ROBOTIC XI  Patient has been NPO since midnight. No blood thinners in the past 5-7 days.  Patient reports that she just started her menses this am.   Patient has hx of prolonged emergence with general anesthesia      Electronically signed by ADRYAN Galvez CNP on 3/3/2022 at 5:46 AM

## 2022-03-03 NOTE — PROGRESS NOTES
Writer speaks with Eyad Olmstead from [facility]. Patient has Ibuprofen allergy. Patient states it upsets her stomach due to lining. Eyad Olmstead states since it bipasses GI tract it will be ok for patient to have IV Toradol.

## 2022-03-03 NOTE — PROGRESS NOTES
Patient arrives to unit from PACU. Bedrails up x 4. Call light within reach. Vital signs taken. Assessment complete. Patient rates pain 9/10.

## 2022-03-03 NOTE — ANESTHESIA PRE PROCEDURE
Department of Anesthesiology  Preprocedure Note       Name:  Manuel Castro   Age:  46 y.o.  :  1980                                          MRN:  [id number]         Date:  3/3/2022      Surgeon: Janet Blanton):  Lauren Christopher MD    Procedure: Procedure(s): HYSTERECTOMY ABDOMINAL LAPAROSCOPIC ROBOTIC XI    Medications prior to admission:   Prior to Admission medications    Medication Sig Start Date End Date Taking? Authorizing Provider   metFORMIN (GLUCOPHAGE) 500 MG tablet Take 500 mg by mouth daily (with breakfast)   Yes Historical Provider, MD   Multiple Vitamins-Minerals (THERAPEUTIC MULTIVITAMIN-MINERALS) tablet Take 1 tablet by mouth daily    Historical Provider, MD   ferrous sulfate (IRON 325) 325 (65 Fe) MG tablet Take 325 mg by mouth daily (with breakfast)    Historical Provider, MD   hydroCHLOROthiazide (HYDRODIURIL) 25 MG tablet TAKE 1 TABLET BY MOUTH EVERY MORNING 10/15/21   Stacie Louise PA-C   pantoprazole (PROTONIX) 20 MG tablet TAKE 1 TABLET BY MOUTH EVERY MORNING BEFORE BREAKFAST 21   ADRYAN Swann CNP   blood glucose test strips (TRUE METRIX BLOOD GLUCOSE TEST) strip TEST TWICE DAILY AND AS NEEDED 9/15/21   ADRYAN Swann CNP   blood glucose monitor kit and supplies 1 kit by Other route 2 times daily 3/9/21   Stacie Louise PA-C   Lancets MISC 1 each by Does not apply route daily 3/9/21   Stacie Louise PA-C   vitamin D (ERGOCALCIFEROL) 1.25 MG (14213 UT) CAPS capsule Take 1 capsule by mouth once a week 2/10/21   Stacie Louise PA-C   oxyCODONE-acetaminophen (PERCOCET) 5-325 MG per tablet Take 1 tablet by mouth every 4 hours as needed for Pain.     Historical Provider, MD       Current medications:    Current Facility-Administered Medications   Medication Dose Route Frequency Provider Last Rate Last Admin    lidocaine PF 1 % injection 1 mL  1 mL IntraDERmal Once PRN Atilio Lane MD        lactated ringers infusion   IntraVENous Continuous Niraj Castellani Ruy Bejarano MD        sodium chloride flush 0.9 % injection 5-40 mL  5-40 mL IntraVENous 2 times per day Renay Ross MD        sodium chloride flush 0.9 % injection 5-40 mL  5-40 mL IntraVENous PRN Renay Ross MD        0.9 % sodium chloride infusion  25 mL IntraVENous PRN Renay Ross MD           Allergies:     Allergies   Allergen Reactions    Latex Itching    Vicodin [Hydrocodone-Acetaminophen] Hives    Bactrim [Sulfamethoxazole-Trimethoprim]     Codeine     Ibuprofen     Sulfa Antibiotics        Problem List:    Patient Active Problem List   Diagnosis Code    Hiatal hernia with GERD without esophagitis K44.9, K21.9    S/P laparoscopic cholecystectomy Y53.91    Biliary colic H38.58    DDD (degenerative disc disease), lumbar M51.36    Migraine headache G43.909    Primary osteoarthritis of both hips M16.0    Tendonitis involving hip abductors M76.899    Trochanteric bursitis M70.60    Vitamin D deficiency E55.9    Elevated blood pressure reading R03.0    Excessive bleeding in premenopausal period N92.4    Pelvic pain in female R10.2    Intramural and submucous leiomyoma of uterus D25.1, D25.0    Acute vaginitis N76.0    Dysmenorrhea N94.6    Fibroid D21.9    Breast pain N64.4    Masses of both breasts N63.10, N63.20       Past Medical History:        Diagnosis Date    Arthritis     Bursitis     Diabetes mellitus (HCC)     borderline    Fibroid tumor     uterine    Fibromyalgia     GERD (gastroesophageal reflux disease)     Hypertension     Migraine     Osteoarthritis     bilat hips    Prolonged emergence from general anesthesia     Renal failure     Stroke (cerebrum) ([facility] [address] 2013    TIA    Tendonitis        Past Surgical History:        Procedure Laterality Date    CHOLECYSTECTOMY, LAPAROSCOPIC N/A 4/23/2020    CHOLECYSTECTOMY LAPAROSCOPIC performed by Tatum Latham MD at [address] [place] [address] COLONOSCOPY N/A 3/5/2021    COLONOSCOPY WITH BIOPSY performed by Venita Alvarado MD at [address] [place] [address]      [facility]     TUBAL LIGATION      2008- essure    UPPER GASTROINTESTINAL ENDOSCOPY  11/2/2018    EGD BIOPSY performed by Christopher [facility] DO at [facility]    UPPER GASTROINTESTINAL ENDOSCOPY N/A 3/5/2021    EGD BIOPSY performed by Sarah Booker MD at [facility]         Social History:    Social History     Tobacco Use    Smoking status: Former Smoker    Smokeless tobacco: Never Used   Substance Use Topics    Alcohol use: Yes     Comment: occassional                                Counseling given: Not Answered      Vital Signs (Current):   Vitals:    03/03/22 0610   BP: 115/67   Pulse: 72   Resp: 18   Temp: 97.3 °F (36.3 °C)   TempSrc: Infrared   SpO2: 98%   Weight: 253 lb (114.8 kg)   Height: 5' 1\" (1.549 m)                                              BP Readings from Last 3 Encounters:   03/03/22 115/67   02/17/22 (!) 138/99   02/11/22 130/86       NPO Status: Time of last liquid consumption: 1700                        Time of last solid consumption: 1700                        Date of last liquid consumption: 03/02/22                        Date of last solid food consumption: 03/02/22    BMI:   Wt Readings from Last 3 Encounters:   03/03/22 253 lb (114.8 kg)   02/17/22 253 lb (114.8 kg)   02/11/22 253 lb (114.8 kg)     Body mass index is 47.8 kg/m².     CBC:   Lab Results   Component Value Date    WBC 6.2 02/17/2022    RBC 4.50 02/17/2022    HGB 11.3 02/17/2022    HCT 34.9 02/17/2022    MCV 77.5 02/17/2022    RDW 17.2 02/17/2022     02/17/2022       CMP:   Lab Results   Component Value Date     02/17/2022    K 4.2 02/17/2022     02/17/2022    CO2 26 02/17/2022    BUN 12 02/17/2022    CREATININE 1.10 02/17/2022    GFRAA >60 02/17/2022    LABGLOM 55 02/17/2022    GLUCOSE 102 02/17/2022    PROT 7.9 03/09/2021    CALCIUM 9.5 02/17/2022    BILITOT 0.21 03/09/2021    ALKPHOS 70 03/09/2021    AST 28 03/09/2021 ALT 32 03/09/2021       POC Tests: No results for input(s): POCGLU, POCNA, POCK, POCCL, POCBUN, POCHEMO, POCHCT in the last 72 hours. Coags: No results found for: PROTIME, INR, APTT    HCG (If Applicable):   Lab Results   Component Value Date    HCG NEGATIVE 03/05/2021        ABGs: No results found for: PHART, PO2ART, STK6OFR, FEX1AKG, BEART, Y9ZCHFMF     Type & Screen (If Applicable):  No results found for: LABABO, LABRH    Drug/Infectious Status (If Applicable):  Lab Results   Component Value Date    HEPCAB NONREACTIVE 02/05/2021       COVID-19 Screening (If Applicable):   Lab Results   Component Value Date    COVID19 Not Detected 03/01/2021           Anesthesia Evaluation  Patient summary reviewed and Nursing notes reviewed history of anesthetic complications (Prolonged emergence from gen anesthesia): Airway: Mallampati: III  TM distance: >3 FB   Neck ROM: full  Mouth opening: > = 3 FB Dental: normal exam         Pulmonary:Negative Pulmonary ROS and normal exam  breath sounds clear to auscultation                             Cardiovascular:    (+) hypertension:,       ECG reviewed  Rhythm: regular  Rate: normal           Beta Blocker:  Not on Beta Blocker         Neuro/Psych:   (+) CVA:, neuromuscular disease:, headaches: migraine headaches,              ROS comment: DDD- lumbar area  Fibromyalgia GI/Hepatic/Renal:   (+) GERD:, renal disease: CRI, morbid obesity          Endo/Other:    (+) DiabetesType II DM, , : arthritis: OA., .                  ROS comment: UTERINE FIBROIDS, IRREGULAR BLEEDING  Abdominal:             Vascular: negative vascular ROS. Other Findings:             Anesthesia Plan      general     ASA 3       Induction: intravenous. MIPS: Postoperative opioids intended and Prophylactic antiemetics administered. Anesthetic plan and risks discussed with patient. Plan discussed with CRNA.                   Jing Merchant MD   3/3/2022

## 2022-03-03 NOTE — DISCHARGE SUMMARY
Gyn Discharge Summary  [facility]      Patient Name: Ahsan Hansen  Patient : 1980  Primary Care Physician: Yosvany Vera PA-C  Admit Date: 3/3/2022    Principal Diagnosis: Menorrhagia, Uterine Fibroids    Other Diagnosis:   Postoperative state [Z98.890]  Patient Active Problem List   Diagnosis    Hiatal hernia with GERD without esophagitis    S/P laparoscopic cholecystectomy    Biliary colic    DDD (degenerative disc disease), lumbar    Migraine headache    Primary osteoarthritis of both hips    Tendonitis involving hip abductors    Trochanteric bursitis    Vitamin D deficiency    Elevated blood pressure reading    Excessive bleeding in premenopausal period    Pelvic pain in female    Intramural and submucous leiomyoma of uterus    Acute vaginitis    Dysmenorrhea    Fibroid    Breast pain    Masses of both breasts    S/p Robotic Assisted Laparoscopic Hysterectomy, Bilateral salpingectomy, cystoscopy 3/3/22    Postoperative state       Infection: No  Hospital Acquired: No    Surgical Operations & Procedures: Robotic assisted laparoscopic hysterectomy, bilateral salpingectomy, cystoscopy    Consultations: Anesthesia    Pertinent Findings & Procedures:   Ahsan Hansen is a 46 y.o. female [id number], admitted for surgical management of her uterine fibroids and menorrhagia; received Ancef preoperatively. She underwent robotic assisted laparoscopic hysterectomy, bilateral salpingectomy and cystoscopy on 3/3/22. Labs on POD#1 notable for Hgb 11.3>9.4, BMP overall unremarkable. Creatinine of 0.98 which is improved from previous of 1.10 on 22. Post-op course normal, discharged home 3/4/22. Follow up in 2 weeks. Discharge instructions reviewed and questions answered.     Course of patient: normal    Discharge to: Home    Readmission planned: No    Recommendations on Discharge:     Medications:     Medication List      START taking these medications acetaminophen 500 MG tablet  Commonly known as: TYLENOL  Take 2 tablets by mouth 3 times daily as needed for Pain     docusate sodium 100 MG capsule  Commonly known as: COLACE  Take 1 capsule by mouth 2 times daily     ondansetron 4 MG disintegrating tablet  Commonly known as: Zofran ODT  Take 1 tablet by mouth every 8 hours as needed for Nausea or Vomiting     oxyCODONE 5 MG immediate release tablet  Commonly known as: Roxicodone  Take 1 tablet by mouth every 6 hours as needed for Pain for up to 5 days. Intended supply: 5 days.  Take lowest dose possible to manage pain     simethicone 80 MG chewable tablet  Commonly known as: MYLICON  Take 1 tablet by mouth 4 times daily as needed for Flatulence        CONTINUE taking these medications    blood glucose monitor kit and supplies  1 kit by Other route 2 times daily     ferrous sulfate 325 (65 Fe) MG tablet  Commonly known as: IRON 325     hydroCHLOROthiazide 25 MG tablet  Commonly known as: HYDRODIURIL  TAKE 1 TABLET BY MOUTH EVERY MORNING     Lancets Misc  1 each by Does not apply route daily     metFORMIN 500 MG tablet  Commonly known as: GLUCOPHAGE     pantoprazole 20 MG tablet  Commonly known as: PROTONIX  TAKE 1 TABLET BY MOUTH EVERY MORNING BEFORE BREAKFAST     therapeutic multivitamin-minerals tablet     True Metrix Blood Glucose Test strip  Generic drug: blood glucose test strips  TEST TWICE DAILY AND AS NEEDED     vitamin D 1.25 MG (37276 UT) Caps capsule  Commonly known as: ERGOCALCIFEROL  Take 1 capsule by mouth once a week        STOP taking these medications    oxyCODONE-acetaminophen 5-325 MG per tablet  Commonly known as: PERCOCET           Where to Get Your Medications      You can get these medications from any pharmacy    Bring a paper prescription for each of these medications  · acetaminophen 500 MG tablet  · docusate sodium 100 MG capsule  · ondansetron 4 MG disintegrating tablet  · oxyCODONE 5 MG immediate release tablet  · simethicone 80 MG chewable tablet          Activity: pelvic rest x 8 weeks, no driving on narcotics, no lifting greater than 10 lbs  Diet: diabetic diet  Follow up: 2 weeks     Condition on discharge: good and stable   Discharge Date: 3/4/22    Comments:  Home care, Follow-up care, restrictions reviewed.     Jyoti Santiago DO  Ob/Gyn Resident  [facility]  3/4/2022, 7:58 AM

## 2022-03-04 VITALS
DIASTOLIC BLOOD PRESSURE: 72 MMHG | WEIGHT: 253 LBS | HEIGHT: 61 IN | TEMPERATURE: 97.9 F | OXYGEN SATURATION: 96 % | BODY MASS INDEX: 47.77 KG/M2 | HEART RATE: 82 BPM | SYSTOLIC BLOOD PRESSURE: 118 MMHG | RESPIRATION RATE: 16 BRPM

## 2022-03-04 LAB
ABSOLUTE EOS #: 0 K/UL (ref 0–0.4)
ABSOLUTE LYMPH #: 1.1 K/UL (ref 1–4.8)
ABSOLUTE MONO #: 0.73 K/UL (ref 0.1–1.3)
ANION GAP SERPL CALCULATED.3IONS-SCNC: 9 MMOL/L (ref 9–17)
BASOPHILS # BLD: 0 % (ref 0–2)
BASOPHILS ABSOLUTE: 0 K/UL (ref 0–0.2)
BUN BLDV-MCNC: 15 MG/DL (ref 6–20)
CALCIUM SERPL-MCNC: 8.7 MG/DL (ref 8.6–10.4)
CHLORIDE BLD-SCNC: 103 MMOL/L (ref 98–107)
CO2: 24 MMOL/L (ref 20–31)
CREAT SERPL-MCNC: 0.98 MG/DL (ref 0.5–0.9)
EOSINOPHILS RELATIVE PERCENT: 0 % (ref 0–4)
GFR AFRICAN AMERICAN: >60 ML/MIN
GFR NON-AFRICAN AMERICAN: >60 ML/MIN
GFR SERPL CREATININE-BSD FRML MDRD: ABNORMAL ML/MIN/{1.73_M2}
GLUCOSE BLD-MCNC: 110 MG/DL (ref 65–105)
GLUCOSE BLD-MCNC: 117 MG/DL (ref 65–105)
GLUCOSE BLD-MCNC: 119 MG/DL (ref 70–99)
HCT VFR BLD CALC: 28.8 % (ref 36–46)
HEMOGLOBIN: 9.4 G/DL (ref 12–16)
LYMPHOCYTES # BLD: 9 % (ref 24–44)
MCH RBC QN AUTO: 25.6 PG (ref 26–34)
MCHC RBC AUTO-ENTMCNC: 32.8 G/DL (ref 31–37)
MCV RBC AUTO: 78.1 FL (ref 80–100)
MONOCYTES # BLD: 6 % (ref 1–7)
MORPHOLOGY: ABNORMAL
PDW BLD-RTO: 18.1 % (ref 11.5–14.9)
PLATELET # BLD: 300 K/UL (ref 150–450)
PMV BLD AUTO: 7.3 FL (ref 6–12)
POTASSIUM SERPL-SCNC: 4.1 MMOL/L (ref 3.7–5.3)
RBC # BLD: 3.68 M/UL (ref 4–5.2)
SEG NEUTROPHILS: 85 % (ref 36–66)
SEGMENTED NEUTROPHILS ABSOLUTE COUNT: 10.37 K/UL (ref 1.3–9.1)
SODIUM BLD-SCNC: 136 MMOL/L (ref 135–144)
SURGICAL PATHOLOGY REPORT: NORMAL
WBC # BLD: 12.2 K/UL (ref 3.5–11)

## 2022-03-04 PROCEDURE — 82947 ASSAY GLUCOSE BLOOD QUANT: CPT

## 2022-03-04 PROCEDURE — 36415 COLL VENOUS BLD VENIPUNCTURE: CPT

## 2022-03-04 PROCEDURE — 6370000000 HC RX 637 (ALT 250 FOR IP): Performed by: STUDENT IN AN ORGANIZED HEALTH CARE EDUCATION/TRAINING PROGRAM

## 2022-03-04 PROCEDURE — 85025 COMPLETE CBC W/AUTO DIFF WBC: CPT

## 2022-03-04 PROCEDURE — 80048 BASIC METABOLIC PNL TOTAL CA: CPT

## 2022-03-04 RX ORDER — SIMETHICONE 80 MG
80 TABLET,CHEWABLE ORAL EVERY 6 HOURS PRN
Status: DISCONTINUED | OUTPATIENT
Start: 2022-03-04 | End: 2022-03-04 | Stop reason: HOSPADM

## 2022-03-04 RX ADMIN — OXYCODONE HYDROCHLORIDE 10 MG: 5 TABLET ORAL at 04:50

## 2022-03-04 RX ADMIN — SENNOSIDES AND DOCUSATE SODIUM 2 TABLET: 50; 8.6 TABLET ORAL at 09:14

## 2022-03-04 RX ADMIN — OXYCODONE HYDROCHLORIDE 10 MG: 5 TABLET ORAL at 09:14

## 2022-03-04 RX ADMIN — PANTOPRAZOLE SODIUM 20 MG: 20 TABLET, DELAYED RELEASE ORAL at 07:18

## 2022-03-04 RX ADMIN — SIMETHICONE 80 MG: 80 TABLET, CHEWABLE ORAL at 09:14

## 2022-03-04 ASSESSMENT — PAIN DESCRIPTION - LOCATION
LOCATION: ABDOMEN

## 2022-03-04 ASSESSMENT — PAIN DESCRIPTION - ORIENTATION
ORIENTATION: MID;LOWER;UPPER
ORIENTATION: MID
ORIENTATION: MID

## 2022-03-04 ASSESSMENT — PAIN SCALES - GENERAL
PAINLEVEL_OUTOF10: 7

## 2022-03-04 ASSESSMENT — PAIN DESCRIPTION - PAIN TYPE
TYPE: SURGICAL PAIN

## 2022-03-04 ASSESSMENT — PAIN DESCRIPTION - PROGRESSION: CLINICAL_PROGRESSION: GRADUALLY IMPROVING

## 2022-03-04 ASSESSMENT — PAIN DESCRIPTION - DESCRIPTORS
DESCRIPTORS: DISCOMFORT
DESCRIPTORS: DISCOMFORT

## 2022-03-04 NOTE — PROGRESS NOTES
Gynecology Progress Note    Date: 3/4/2022  Time: 7:06 AM    Patrice Milner 46 y.o. female [id number], POD # 1 s/p VANCE OLIVA, cystoscopy on 3/3/22    Patient seen and examined. She complains of some postop pain which is overall controlled with PO medication. Pain is controlled. Patient is  tolerating oral intake. She is urinating well. She denies any vaginal bleeding. She is  ambulating without difficulty. She is not yet passing flatus. She denies Fever/Chills, Chest Pain, SOB, N/V, Calf Pain. Vitals:  Vitals:    03/03/22 1339 03/03/22 1911 03/03/22 2346 03/04/22 0449   BP: (!) 120/58 133/62 (!) 105/54 (!) 108/59   Pulse: 96 97 89 88   Resp:  16  16   Temp:  97.1 °F (36.2 °C) 97 °F (36.1 °C) 97.5 °F (36.4 °C)   TempSrc:  Infrared Infrared Infrared   SpO2:  98% 97% 96%   Weight:       Height:            Intake/Output:   Last Shift: I/O last 3 completed shifts: In: 1450 [I.V.:1450]  Out: 1225 [Urine:1225]  Current Shift: No intake/output data recorded.    600ml over 9 hours = 66ml/h      Physical Exam:  General:  no apparent distress, alert and cooperative  Neurologic:  alert, oriented, normal speech, no focal findings or movement disorder noted  Lungs:  No increased work of breathing, good air exchange, clear to auscultation bilaterally, no crackles or wheezing  Heart:  regular rate and rhythm and no murmur    Abdomen: soft, non-distended, appropriate tenderness, no CVA tenderness, normal bowel sounds, no guarding, rebound, or rigidity  Incision: clean, dry and intact  Extremities:  no calf tenderness, non edematous    Lab:  Recent Results (from the past 12 hour(s))   POC Glucose Fingerstick    Collection Time: 03/03/22  9:07 PM   Result Value Ref Range    POC Glucose 104 65 - 105 mg/dL   POC Glucose Fingerstick    Collection Time: 03/04/22  1:20 AM   Result Value Ref Range    POC Glucose 117 (H) 65 - 105 mg/dL   CBC with Auto Differential    Collection Time: 03/04/22  6:31 AM   Result Value Ref Range WBC 12.2 (H) 3.5 - 11.0 k/uL    RBC 3.68 (L) 4.0 - 5.2 m/uL    Hemoglobin 9.4 (L) 12.0 - 16.0 g/dL    Hematocrit 28.8 (L) 36 - 46 %    MCV 78.1 (L) 80 - 100 fL    MCH 25.6 (L) 26 - 34 pg    MCHC 32.8 31 - 37 g/dL    RDW 18.1 (H) 11.5 - 14.9 %    Platelets 260 562 - 849 k/uL    MPV 7.3 6.0 - 12.0 fL    Seg Neutrophils PENDING %    Lymphocytes PENDING %    Monocytes PENDING %    Eosinophils % PENDING %    Basophils PENDING %    Segs Absolute PENDING k/uL    Absolute Lymph # PENDING k/uL    Absolute Mono # PENDING k/uL    Absolute Eos # PENDING k/uL    Basophils Absolute PENDING 0.0 - 0.2 k/uL       Assessment/Plan:  Lamon Cowden ParkerBoyd 46 y.o. female [id number], POD #1 s/p PJ BS, cysto   - Doing well, vitals stable overnight   - S/p cali catheter, UOP appropriate, voiding spontaneously   - Discontinue IVF   - Encourage ambulation and use of incentive spirometer   - Pain control: Tamiko/Tylenol   - Labs: CBC has resulted, Hgb 11.3>9.4, BMP this AM pending   - DVT Proph: SCDs   - Senna BID, MoM and Dulcolax PRN   - Zofran PRN   - Abx: S/p Ancef preoperatively   - Diet: General   - Path: Pending   - Continue post-op care, please page with any questions   - Anticipate discharge today    GERD   - Protonix 20mg daily    Insulin Resistance   - Pt on Metformin, held during admission   - POCT AC/HS   - Insulin sliding scale ordered    Hypertension   - Some elevated BP postoperatively, controlled overnight    BMI JAKE Pino 85, DO  Ob/Gyn Resident  Pager: [phone]  3/4/2022, 7:06 AM

## 2022-03-04 NOTE — PLAN OF CARE
Problem: Pain:  Goal: Pain level will decrease  3/4/2022 1312 by Steven Gupta RN  Outcome: Completed  3/4/2022 0712 by Narinder Zaidi RN  Outcome: Ongoing  Goal: Control of acute pain  3/4/2022 1312 by Steven Gupta RN  Outcome: Completed  3/4/2022 0712 by Narinder Zaidi RN  Outcome: Ongoing  Goal: Control of chronic pain  3/4/2022 1312 by Steven Gupta RN  Outcome: Completed  3/4/2022 0712 by Narinder Zaidi RN  Outcome: Ongoing     Problem: Discharge Planning:  Goal: Discharged to appropriate level of care  3/4/2022 1312 by Steven Gupta RN  Outcome: Completed  3/4/2022 32 82 12 by Narinder Zaidi RN  Outcome: Ongoing     Problem: Constipation - Risk of:  Goal: Bowel elimination is within specified parameters  3/4/2022 1312 by Steven Gupta RN  Outcome: Completed  3/4/2022 32 82 12 by Narinder Zaidi RN  Outcome: Ongoing     Problem: Infection - Surgical Site:  Goal: Demonstration of wound healing without infection will improve  3/4/2022 1312 by Steven Gupta RN  Outcome: Completed  3/4/2022 0712 by Narinder Zaidi RN  Outcome: Ongoing     Problem: Pain - Acute:  Goal: Pain level will decrease  3/4/2022 1312 by Steven Gupta RN  Outcome: Completed  3/4/2022 0712 by Narinder Zaidi RN  Outcome: Ongoing     Problem: Urinary Elimination - Impaired:  Goal: Ability to achieve and maintain adequate urine output will improve to within specified parameters  3/4/2022 1312 by Steven Gupta RN  Outcome: Completed  3/4/2022 0712 by Narinder Zaidi RN  Outcome: Ongoing     Problem: Venous Thromboembolism:  Goal: Will show no signs or symptoms of venous thromboembolism  3/4/2022 1312 by Steven Gupta RN  Outcome: Completed  3/4/2022 0712 by Narinder Zaidi RN  Outcome: Ongoing  Goal: Absence of signs or symptoms of impaired coagulation  3/4/2022 1312 by Steven Gupta RN  Outcome: Completed  3/4/2022 0712 by Narinder Zaidi RN  Outcome: Ongoing

## 2022-03-04 NOTE — PLAN OF CARE
Problem: Pain:  Description: Pain management should include both nonpharmacologic and pharmacologic interventions.   Goal: Pain level will decrease  Description: Pain level will decrease  Outcome: Ongoing  Goal: Control of acute pain  Description: Control of acute pain  Outcome: Ongoing  Goal: Control of chronic pain  Description: Control of chronic pain  Outcome: Ongoing     Problem: Discharge Planning:  Goal: Discharged to appropriate level of care  Description: Discharged to appropriate level of care  Outcome: Ongoing     Problem: Constipation - Risk of:  Goal: Bowel elimination is within specified parameters  Description: Bowel elimination is within specified parameters  Outcome: Ongoing     Problem: Infection - Surgical Site:  Goal: Demonstration of wound healing without infection will improve  Description: Demonstration of wound healing without infection will improve  Outcome: Ongoing     Problem: Pain - Acute:  Goal: Pain level will decrease  Description: Pain level will decrease  Outcome: Ongoing     Problem: Urinary Elimination - Impaired:  Goal: Ability to achieve and maintain adequate urine output will improve to within specified parameters  Description: Ability to achieve and maintain adequate urine output will improve to within specified parameters  Outcome: Ongoing     Problem: Venous Thromboembolism:  Goal: Will show no signs or symptoms of venous thromboembolism  Description: Will show no signs or symptoms of venous thromboembolism  Outcome: Ongoing  Goal: Absence of signs or symptoms of impaired coagulation  Description: Absence of signs or symptoms of impaired coagulation  Outcome: Ongoing

## 2022-03-04 NOTE — PROGRESS NOTES
CLINICAL PHARMACY NOTE: MEDS TO BEDS    Total # of Prescriptions Filled: 5   The following medications were delivered to the patient:  · Oxycodone  · Acetaminophen  · Ondansetron  · Mylicon  · colace    Additional Documentation:

## 2022-04-02 PROBLEM — Z98.890 POST-OPERATIVE STATE: Status: RESOLVED | Noted: 2022-03-03 | Resolved: 2022-04-02

## 2022-07-05 ENCOUNTER — HOSPITAL ENCOUNTER (EMERGENCY)
Age: 42
Discharge: HOME | End: 2022-07-05
Attending: EMERGENCY MEDICINE
Payer: MEDICARE

## 2022-07-05 VITALS
SYSTOLIC BLOOD PRESSURE: 129 MMHG | OXYGEN SATURATION: 98 % | TEMPERATURE: 98.4 F | RESPIRATION RATE: 16 BRPM | BODY MASS INDEX: 52.49 KG/M2 | HEIGHT: 61 IN | DIASTOLIC BLOOD PRESSURE: 87 MMHG | WEIGHT: 278 LBS | HEART RATE: 89 BPM

## 2022-07-05 DIAGNOSIS — S90.31XA CONTUSION OF RIGHT FOOT, INITIAL ENCOUNTER: Primary | ICD-10-CM

## 2022-07-05 PROCEDURE — 99282 EMERGENCY DEPT VISIT SF MDM: CPT

## 2022-07-05 ASSESSMENT — ENCOUNTER SYMPTOMS: COLOR CHANGE: 0

## 2022-07-05 ASSESSMENT — PAIN - FUNCTIONAL ASSESSMENT: PAIN_FUNCTIONAL_ASSESSMENT: 0-10

## 2022-07-05 ASSESSMENT — PAIN SCALES - GENERAL: PAINLEVEL_OUTOF10: 10

## 2022-07-05 NOTE — ED PROVIDER NOTES
Team [address] ED  eMERGENCY dEPARTMENT eNCOUnter      Pt Name: Twila Newby  MRN: [id number]  [place] 1980  Date of evaluation: 7/5/2022  Provider: ADRYAN Mckeon CNP    CHIEF COMPLAINT       Chief Complaint   Patient presents with    Foot Injury     Right foot injury; pt reports she kicked a metal stand; was seen at [facility] today          HISTORY OF PRESENT ILLNESS  (Location/Symptom, Timing/Onset, Context/Setting, Quality, Duration, Modifying Factors, Severity.)   Twila Newby is a 38 y.o. female who presents to the emergency department via private auto for pain to her right foot. States most of the pain is to the great toe and ball of the foot. She struck it on a tripod camera/wig stand last night. Denies N/T. She had negative x-rays at an urgent care today. She was given a prescription for Voltaren gel. States she is here for a shoe and crutches. Rates her pain 10/10 at this time. X-RAY FROM URGENT CARE:  07/05/2022 12:07 PM EDT      Procedure: Right foot radiographs performed    Number of views:3    History:Injury and pain    Comparison:None    Findings: There is no fracture, dislocation, or destructive lesion. Impression:    No acute findings. Workstation:[id number]    Finalized by Kelley Cardoza DO on 7/5/2022 12:07 PM             Nursing Notes were reviewed.     ALLERGIES     Latex, Vicodin [hydrocodone-acetaminophen], Bactrim [sulfamethoxazole-trimethoprim], Codeine, Ibuprofen, and Sulfa antibiotics    CURRENT MEDICATIONS       Previous Medications    ACETAMINOPHEN (TYLENOL) 500 MG TABLET    Take 2 tablets by mouth 3 times daily as needed for Pain    BLOOD GLUCOSE MONITOR KIT AND SUPPLIES    1 kit by Other route 2 times daily    BLOOD GLUCOSE TEST STRIPS (TRUE METRIX BLOOD GLUCOSE TEST) STRIP    TEST TWICE DAILY AND AS NEEDED    FERROUS SULFATE (IRON 325) 325 (65 FE) MG TABLET    Take 325 mg by mouth daily (with breakfast)    HYDROCHLOROTHIAZIDE (HYDRODIURIL) 25 MG TABLET    TAKE 1 TABLET BY MOUTH EVERY MORNING    LANCETS MISC    1 each by Does not apply route daily    METFORMIN (GLUCOPHAGE) 500 MG TABLET    Take 500 mg by mouth daily (with breakfast)    MULTIPLE VITAMINS-MINERALS (THERAPEUTIC MULTIVITAMIN-MINERALS) TABLET    Take 1 tablet by mouth daily    ONDANSETRON (ZOFRAN ODT) 4 MG DISINTEGRATING TABLET    Take 1 tablet by mouth every 8 hours as needed for Nausea or Vomiting    PANTOPRAZOLE (PROTONIX) 20 MG TABLET    TAKE 1 TABLET BY MOUTH EVERY MORNING BEFORE BREAKFAST    SIMETHICONE (MYLICON) 80 MG CHEWABLE TABLET    Take 1 tablet by mouth 4 times daily as needed for Flatulence    VITAMIN D (ERGOCALCIFEROL) 1.25 MG (98283 UT) CAPS CAPSULE    Take 1 capsule by mouth once a week       PAST MEDICAL HISTORY         Diagnosis Date    Arthritis     Bursitis     Diabetes mellitus (HCC)     borderline    Fibroid tumor     uterine    Fibromyalgia     GERD (gastroesophageal reflux disease)     Hypertension     Migraine     Osteoarthritis     bilat hips    Prolonged emergence from general anesthesia     Renal failure     Stroke (cerebrum) ([facility] [address] 2013    TIA    Tendonitis        SURGICAL HISTORY           Procedure Laterality Date    CHOLECYSTECTOMY, LAPAROSCOPIC N/A 4/23/2020    CHOLECYSTECTOMY LAPAROSCOPIC performed by Sandra Mello MD at [address] [place] [address] COLONOSCOPY N/A 3/5/2021    COLONOSCOPY WITH BIOPSY performed by Abhilash Boles MD at [address] [place] [address]     HYSTERECTOMY (CERVIX STATUS UNKNOWN) N/A 3/3/2022    HYSTERECTOMY ABDOMINAL LAPAROSCOPIC ROBOTIC, BILATERAL SALPINGECTOMY XI, CYSTOSCOPY performed by Luis Fernando Bryant MD at [address] [place] [address]      2008- essure    UPPER GASTROINTESTINAL ENDOSCOPY  11/2/2018    EGD BIOPSY performed by Nina Virgen DO at [facility]    UPPER GASTROINTESTINAL ENDOSCOPY N/A 3/5/2021    EGD BIOPSY performed by Abhilash Boles MD at [facility] EXTRACTION           FAMILY HISTORY           Problem Relation Age of Onset    Arthritis Mother     Mental Illness Mother     Arthritis Father     Breast Cancer Maternal Aunt     Breast Cancer Maternal Aunt      Family Status   Relation Name Status    Mother  (Not Specified)    Father  (Not Specified)    MAunt  (Not Specified)    MAunt  (Not Specified)        SOCIAL HISTORY      reports that she has quit smoking. She has never used smokeless tobacco. She reports current alcohol use. She reports that she does not use drugs. REVIEW OF SYSTEMS    (2-9 systems for level 4, 10 or more for level 5)     Review of Systems   Constitutional: Negative for chills, diaphoresis, fatigue and fever. Musculoskeletal: Positive for arthralgias and myalgias. Skin: Negative for color change, rash and wound. Neurological: Negative for weakness and numbness. Except as noted above the remainder of the review of systems was reviewed and negative. PHYSICAL EXAM    (up to 7 for level 4, 8 or more for level 5)     ED Triage Vitals [07/05/22 1732]   BP Temp Temp Source Heart Rate Resp SpO2 Height Weight   129/87 98.4 °F (36.9 °C) Oral 89 16 98 % 5' 1\" (1.549 m) 278 lb (126.1 kg)     Physical Exam  Vitals reviewed. Constitutional:       General: She is not in acute distress. Appearance: She is well-developed. She is not diaphoretic. Eyes:      Conjunctiva/sclera: Conjunctivae normal.   Cardiovascular:      Rate and Rhythm: Normal rate. Pulses: Normal pulses. Pulmonary:      Effort: Pulmonary effort is normal. No respiratory distress. Breath sounds: No stridor. Musculoskeletal:      Right ankle: No swelling or deformity. No tenderness. Normal range of motion. Normal pulse. Right Achilles Tendon: No tenderness or defects. Right foot: Decreased range of motion. Normal capillary refill. Tenderness (great toe and ball of the foot) present. No swelling or deformity. Normal pulse.       Comments: No erythema, bruising, wound noted to areas of discomfort on right foot. Skin:     General: Skin is warm and dry. Capillary Refill: Capillary refill takes less than 2 seconds. Findings: No rash. Neurological:      Mental Status: She is alert and oriented to person, place, and time. Psychiatric:         Behavior: Behavior normal.           EMERGENCY DEPARTMENT COURSE and DIFFERENTIAL DIAGNOSIS/MDM:   Vitals:    Vitals:    07/05/22 1732   BP: 129/87   Pulse: 89   Resp: 16   Temp: 98.4 °F (36.9 °C)   TempSrc: Oral   SpO2: 98%   Weight: 278 lb (126.1 kg)   Height: 5' 1\" (1.549 m)     CLINICAL DECISION MAKING:  The patient presented alert with a nontoxic appearance and was seen in conjunction with Dr. Arabella Ortega. Her imaging from the urgent care was negative for fracture. an ace wrap and post-op shoe was applied. The applications were checked and were appropriate. She was fitted for crutches and teaching was done. She was prescribed Voltaren gel at the urgent care that she was instructed to use as directed for her pain. Follow up for a recheck. Evaluation and treatment course in the ED, and plan of care upon discharge was discussed in length with the patient. Patient had no further questions prior to being discharged and was instructed to return to the ED for new or worsening symptoms. Care was provided during an unprecedented national emergency due to the novel coronavirus, Covid-19. FINAL IMPRESSION      1.  Contusion of right foot, initial encounter            Problem List  Patient Active Problem List   Diagnosis Code    Hiatal hernia with GERD without esophagitis K44.9, K21.9    S/P laparoscopic cholecystectomy D63.13    Biliary colic N31.37    DDD (degenerative disc disease), lumbar M51.36    Migraine headache G43.909    Primary osteoarthritis of both hips M16.0    Tendonitis involving hip abductors M76.899    Trochanteric bursitis M70.60    Vitamin D deficiency E55.9    Elevated

## 2022-07-12 NOTE — ED PROVIDER NOTES
eMERGENCY dEPARTMENT eNCOUnter   Independent Attestation     Pt Name: Twila Newby  MRN: [id number]  [place] 1980  Date of evaluation: 7/11/22     Twila Newby is a 38 y.o. female with CC: Foot Injury (Right foot injury; pt reports she kicked a metal stand; was [facility] today )        This visit was performed by both a physician and an APC. I performed all aspects of the MDM as documented. The care is provided during an unprecedented national emergency due to the novel coronavirus, COVID 19.     Vega Flores MD  Attending Emergency Physician          Sammy Orosco MD  07/11/22 1047

## 2023-09-08 ENCOUNTER — HOSPITAL ENCOUNTER (EMERGENCY)
Age: 43
Discharge: HOME | End: 2023-09-08
Attending: EMERGENCY MEDICINE
Payer: COMMERCIAL

## 2023-09-08 ENCOUNTER — APPOINTMENT (OUTPATIENT)
Dept: CT IMAGING | Age: 43
End: 2023-09-08
Payer: COMMERCIAL

## 2023-09-08 VITALS
WEIGHT: 280 LBS | RESPIRATION RATE: 18 BRPM | BODY MASS INDEX: 52.87 KG/M2 | OXYGEN SATURATION: 98 % | HEIGHT: 61 IN | HEART RATE: 104 BPM | TEMPERATURE: 98.4 F | SYSTOLIC BLOOD PRESSURE: 150 MMHG | DIASTOLIC BLOOD PRESSURE: 91 MMHG

## 2023-09-08 DIAGNOSIS — M54.50 CHRONIC BILATERAL LOW BACK PAIN, UNSPECIFIED WHETHER SCIATICA PRESENT: Primary | ICD-10-CM

## 2023-09-08 DIAGNOSIS — G89.29 CHRONIC BILATERAL LOW BACK PAIN, UNSPECIFIED WHETHER SCIATICA PRESENT: Primary | ICD-10-CM

## 2023-09-08 LAB
ALBUMIN SERPL-MCNC: 3.8 G/DL (ref 3.5–5.2)
ALBUMIN SERPL-MCNC: 4.1 G/DL (ref 3.5–5.2)
ALP SERPL-CCNC: 107 U/L (ref 35–104)
ALP SERPL-CCNC: 97 U/L (ref 35–104)
ALT SERPL-CCNC: 50 U/L (ref 5–33)
ALT SERPL-CCNC: 53 U/L (ref 5–33)
ANION GAP SERPL CALCULATED.3IONS-SCNC: 12 MMOL/L (ref 9–17)
AST SERPL-CCNC: 32 U/L
AST SERPL-CCNC: 36 U/L
BASOPHILS # BLD: 0 K/UL (ref 0–0.2)
BASOPHILS NFR BLD: 1 % (ref 0–2)
BILIRUB DIRECT SERPL-MCNC: 0.1 MG/DL
BILIRUB INDIRECT SERPL-MCNC: 0.1 MG/DL (ref 0–1)
BILIRUB SERPL-MCNC: 0.2 MG/DL (ref 0.3–1.2)
BILIRUB SERPL-MCNC: 0.3 MG/DL (ref 0.3–1.2)
BILIRUB UR QL STRIP: NEGATIVE
BUN SERPL-MCNC: 11 MG/DL (ref 6–20)
CALCIUM SERPL-MCNC: 9.5 MG/DL (ref 8.6–10.4)
CHLORIDE SERPL-SCNC: 101 MMOL/L (ref 98–107)
CLARITY UR: CLEAR
CO2 SERPL-SCNC: 27 MMOL/L (ref 20–31)
COLOR UR: YELLOW
COMMENT: NORMAL
CREAT SERPL-MCNC: 0.9 MG/DL (ref 0.5–0.9)
EOSINOPHIL # BLD: 0.1 K/UL (ref 0–0.4)
EOSINOPHILS RELATIVE PERCENT: 2 % (ref 0–4)
ERYTHROCYTE [DISTWIDTH] IN BLOOD BY AUTOMATED COUNT: 14.9 % (ref 11.5–14.9)
GFR SERPL CREATININE-BSD FRML MDRD: >60 ML/MIN/1.73M2
GLUCOSE SERPL-MCNC: 121 MG/DL (ref 70–99)
GLUCOSE UR STRIP-MCNC: NEGATIVE MG/DL
HCG SERPL QL: NEGATIVE
HCT VFR BLD AUTO: 39.8 % (ref 36–46)
HGB BLD-MCNC: 13.3 G/DL (ref 12–16)
HGB UR QL STRIP.AUTO: NEGATIVE
INR PPP: 0.9
KETONES UR STRIP-MCNC: NEGATIVE MG/DL
LEUKOCYTE ESTERASE UR QL STRIP: NEGATIVE
LIPASE SERPL-CCNC: 32 U/L (ref 13–60)
LYMPHOCYTES NFR BLD: 1.5 K/UL (ref 1–4.8)
LYMPHOCYTES RELATIVE PERCENT: 21 % (ref 24–44)
MCH RBC QN AUTO: 28.6 PG (ref 26–34)
MCHC RBC AUTO-ENTMCNC: 33.3 G/DL (ref 31–37)
MCV RBC AUTO: 86.1 FL (ref 80–100)
MONOCYTES NFR BLD: 0.3 K/UL (ref 0.1–1.3)
MONOCYTES NFR BLD: 4 % (ref 1–7)
NEUTROPHILS NFR BLD: 72 % (ref 36–66)
NEUTS SEG NFR BLD: 5.1 K/UL (ref 1.3–9.1)
NITRITE UR QL STRIP: NEGATIVE
PARTIAL THROMBOPLASTIN TIME: 28.1 SEC (ref 24–36)
PH UR STRIP: 6 [PH] (ref 5–8)
PLATELET # BLD AUTO: 344 K/UL (ref 150–450)
PMV BLD AUTO: 7.9 FL (ref 6–12)
POTASSIUM SERPL-SCNC: 3.9 MMOL/L (ref 3.7–5.3)
PROT SERPL-MCNC: 7.4 G/DL (ref 6.4–8.3)
PROT SERPL-MCNC: 7.9 G/DL (ref 6.4–8.3)
PROT UR STRIP-MCNC: NEGATIVE MG/DL
PROTHROMBIN TIME: 12.4 SEC (ref 11.8–14.6)
RBC # BLD AUTO: 4.63 M/UL (ref 4–5.2)
SODIUM SERPL-SCNC: 140 MMOL/L (ref 135–144)
SP GR UR STRIP: 1.01 (ref 1–1.03)
UROBILINOGEN UR STRIP-ACNC: NORMAL EU/DL (ref 0–1)
WBC OTHER # BLD: 7.1 K/UL (ref 3.5–11)

## 2023-09-08 PROCEDURE — 87086 URINE CULTURE/COLONY COUNT: CPT

## 2023-09-08 PROCEDURE — 81003 URINALYSIS AUTO W/O SCOPE: CPT

## 2023-09-08 PROCEDURE — 99284 EMERGENCY DEPT VISIT MOD MDM: CPT

## 2023-09-08 PROCEDURE — 85025 COMPLETE CBC W/AUTO DIFF WBC: CPT

## 2023-09-08 PROCEDURE — 74176 CT ABD & PELVIS W/O CONTRAST: CPT

## 2023-09-08 PROCEDURE — 84703 CHORIONIC GONADOTROPIN ASSAY: CPT

## 2023-09-08 PROCEDURE — 80076 HEPATIC FUNCTION PANEL: CPT

## 2023-09-08 PROCEDURE — 85730 THROMBOPLASTIN TIME PARTIAL: CPT

## 2023-09-08 PROCEDURE — 6370000000 HC RX 637 (ALT 250 FOR IP): Performed by: EMERGENCY MEDICINE

## 2023-09-08 PROCEDURE — 83690 ASSAY OF LIPASE: CPT

## 2023-09-08 PROCEDURE — 80053 COMPREHEN METABOLIC PANEL: CPT

## 2023-09-08 PROCEDURE — 85610 PROTHROMBIN TIME: CPT

## 2023-09-08 PROCEDURE — 36415 COLL VENOUS BLD VENIPUNCTURE: CPT

## 2023-09-08 RX ORDER — ACETAMINOPHEN 500 MG
1000 TABLET ORAL ONCE
Status: COMPLETED | OUTPATIENT
Start: 2023-09-08 | End: 2023-09-08

## 2023-09-08 RX ADMIN — ACETAMINOPHEN 1000 MG: 500 TABLET ORAL at 05:19

## 2023-09-08 ASSESSMENT — ENCOUNTER SYMPTOMS
ABDOMINAL PAIN: 0
VOMITING: 0
NAUSEA: 0
COUGH: 0
SHORTNESS OF BREATH: 0
BACK PAIN: 1

## 2023-09-08 ASSESSMENT — PAIN DESCRIPTION - LOCATION
LOCATION: ABDOMEN
LOCATION: BACK;LEG

## 2023-09-08 ASSESSMENT — PAIN DESCRIPTION - ORIENTATION: ORIENTATION: RIGHT;LEFT

## 2023-09-08 ASSESSMENT — PAIN - FUNCTIONAL ASSESSMENT: PAIN_FUNCTIONAL_ASSESSMENT: 0-10

## 2023-09-08 ASSESSMENT — PAIN SCALES - GENERAL: PAINLEVEL_OUTOF10: 8

## 2023-09-08 NOTE — ED PROVIDER NOTES
EMERGENCY DEPARTMENT ENCOUNTER    Pt Name: Karen Tello  MRN: [id number]  [address] [facility] [place] 1980  Date of evaluation: 9/8/23  CHIEF COMPLAINT       Chief Complaint   Patient presents with    Dysuria     Painful urination and pressure x 1 week     HISTORY OF PRESENT ILLNESS   51-year-old female presenting with chief complaint of burning on urination for the last week. She is complaining of lower back pain. She says she has a history of chronic low back pain. She denies any numbness. Denies any urinary incontinence or retention. Denies any blood in her urine. Denies flank pain. Denies fever or chills. The history is provided by the patient. REVIEW OF SYSTEMS     Review of Systems   Constitutional:  Negative for chills and fever. HENT:  Negative for congestion. Eyes:  Negative for visual disturbance. Respiratory:  Negative for cough and shortness of breath. Cardiovascular:  Negative for chest pain. Gastrointestinal:  Negative for abdominal pain, nausea and vomiting. Genitourinary:  Positive for dysuria. Negative for flank pain, frequency and urgency. Musculoskeletal:  Positive for back pain. Negative for myalgias. Neurological:  Negative for dizziness, light-headedness and headaches. Psychiatric/Behavioral:  Negative for behavioral problems.     PASTMEDICAL HISTORY     Past Medical History:   Diagnosis Date    Arthritis     Bursitis     Diabetes mellitus ([address])     borderline    Fibroid tumor     uterine    Fibromyalgia     GERD (gastroesophageal reflux disease)     Hypertension     Lupus (HCC)     Migraine     Osteoarthritis     bilat hips    Prolonged emergence from general anesthesia     Renal failure     Stroke (cerebrum) ([address]) 2013    TIA    Tendonitis      Past Problem List  Patient Active Problem List   Diagnosis Code    Hiatal hernia with GERD without esophagitis K44.9, K21.9    S/P laparoscopic cholecystectomy Y89.84    Biliary colic N37.19    DDD (degenerative disc identified on CT abdomen/pelvis. 3)  Treatment and Disposition         Patient was given Tylenol. Patient repeat assessment: She was seen ambulating without difficulty to the restroom. Patient has been resting comfortably in her room since she arrived to the emergency department. Her pain is likely secondary to her chronic low back pain. Patient is requested a urine culture and that has been sent. Disposition discussion with patient/family, Shared Decision Making: Shared decision-making was applied and patient is comfortable with outpatient discharge and follow-up with primary care physician. She stable for discharge at this time. CRITICAL CARE:       PROCEDURES:    Procedures      DATA FOR LAB AND RADIOLOGY TESTS ORDERED BELOW ARE REVIEWED BY THE ED CLINICIAN:    RADIOLOGY: All x-rays, CT, MRI, and formal ultrasound images (except ED bedside ultrasound) are read by the radiologist, see reports below, unless otherwise noted in MDM or here. Reports below are reviewed by myself. CT ABDOMEN PELVIS WO CONTRAST Additional Contrast? None   Final Result   Addendum (preliminary) 1 of 1   ADDENDUM:   Per additional history from the clinician, the patient has undergone   hysterectomy. This cystic density area in the pelvis without surrounding   inflammatory change almost certainly represents a postsurgical collection   such as a seroma. Given proximity of the left ovary, a left ovarian    cystic   lesion is less likely but not excluded. Pelvic ultrasound could be    obtained   for confirmation on a nonemergent basis. Discussed with Dr. Victor Hugo Sampson by Dr. Brigitte Taylor at 6:36 am on 9/8/2023         Final   1. No evidence of obstructive uropathy. 2.  Abnormal hypoattenuation of the uterus measuring near cystic density with   lobular contour which may indicate underlying fibroids. Consider pelvic   ultrasound for further evaluation. 3.  Mild hepatic steatosis.              LABS: Lab orders shown below,

## 2023-09-08 NOTE — ED NOTES
Discharge instructions reviewed with patient, noting all directions and education by provider. Patient verbalizes understanding of all information reviewed, gathered personal items, and transferred under own power off unit to lobby without incident.        Wanda Ocononr RN  09/08/23 7752

## 2023-09-08 NOTE — ED TRIAGE NOTES
Mode of arrival (squad #, walk in, police, etc) : no        Chief complaint(s): possible UTI        Arrival Note (brief scenario, treatment PTA, etc). : Painful urination and pressure x 1 week        C= \"Have you ever felt that you should Cut down on your drinking? \"  No  A= \"Have people Annoyed you by criticizing your drinking? \"  No  G= \"Have you ever felt bad or Guilty about your drinking? \"  No  E= \"Have you ever had a drink as an Eye-opener first thing in the morning to steady your nerves or to help a hangover? \"  No      Deferred []      Reason for deferring: N/A    *If yes to two or more: probable alcohol abuse. *

## 2023-09-09 LAB
MICROORGANISM SPEC CULT: NORMAL
SPECIMEN DESCRIPTION: NORMAL

## 2023-09-12 ENCOUNTER — HOSPITAL ENCOUNTER (OUTPATIENT)
Age: 43
Setting detail: SPECIMEN
Discharge: HOME | End: 2023-09-12

## 2023-09-12 ENCOUNTER — OFFICE VISIT (OUTPATIENT)
Dept: OBGYN CLINIC | Age: 43
End: 2023-09-12
Payer: COMMERCIAL

## 2023-09-12 VITALS
SYSTOLIC BLOOD PRESSURE: 138 MMHG | HEART RATE: 88 BPM | WEIGHT: 282 LBS | DIASTOLIC BLOOD PRESSURE: 94 MMHG | HEIGHT: 61 IN | BODY MASS INDEX: 53.24 KG/M2

## 2023-09-12 DIAGNOSIS — N76.0 ACUTE VAGINITIS: ICD-10-CM

## 2023-09-12 DIAGNOSIS — Z01.419 WELL WOMAN EXAM: Primary | ICD-10-CM

## 2023-09-12 DIAGNOSIS — N89.8 VAGINAL DISCHARGE: ICD-10-CM

## 2023-09-12 DIAGNOSIS — Z98.890 POST-OPERATIVE STATE: ICD-10-CM

## 2023-09-12 DIAGNOSIS — R10.2 PELVIC PAIN IN FEMALE: ICD-10-CM

## 2023-09-12 PROCEDURE — 99396 PREV VISIT EST AGE 40-64: CPT | Performed by: SPECIALIST

## 2023-09-12 RX ORDER — FUROSEMIDE 40 MG/1
40 TABLET ORAL DAILY
COMMUNITY
Start: 2023-08-03

## 2023-09-12 ASSESSMENT — ENCOUNTER SYMPTOMS
COUGH: 0
DIARRHEA: 0
VOMITING: 0
ABDOMINAL DISTENTION: 0
BACK PAIN: 1
APNEA: 0
ABDOMINAL PAIN: 0
NAUSEA: 0
CONSTIPATION: 0
EYE PAIN: 0

## 2023-09-12 NOTE — PROGRESS NOTES
unless otherwise noted below.      Electronically signed by Isaiah Dos Santos MD on 9/14/2023 at 12:40 PM

## 2023-09-13 LAB
C TRACH DNA SPEC QL PROBE+SIG AMP: NEGATIVE
CANDIDA SPECIES: NEGATIVE
GARDNERELLA VAGINALIS: NEGATIVE
N GONORRHOEA DNA SPEC QL PROBE+SIG AMP: NEGATIVE
SOURCE: NORMAL
SPECIMEN DESCRIPTION: NORMAL
TRICHOMONAS: NEGATIVE

## 2023-09-14 PROBLEM — Z01.419 WELL WOMAN EXAM: Status: ACTIVE | Noted: 2021-10-04

## 2023-09-14 PROBLEM — N89.8 VAGINAL DISCHARGE: Status: ACTIVE | Noted: 2023-09-14

## 2023-09-18 ENCOUNTER — TELEMEDICINE (OUTPATIENT)
Dept: OBGYN CLINIC | Age: 43
End: 2023-09-18
Payer: COMMERCIAL

## 2023-09-18 ENCOUNTER — HOSPITAL ENCOUNTER (OUTPATIENT)
Age: 43
Discharge: HOME | End: 2023-09-18
Payer: COMMERCIAL

## 2023-09-18 DIAGNOSIS — R10.2 PELVIC PAIN IN FEMALE: ICD-10-CM

## 2023-09-18 DIAGNOSIS — R19.00 PELVIC MASS IN FEMALE: Primary | ICD-10-CM

## 2023-09-18 DIAGNOSIS — R19.00 PELVIC MASS IN FEMALE: ICD-10-CM

## 2023-09-18 DIAGNOSIS — Z90.710 STATUS POST HYSTERECTOMY: ICD-10-CM

## 2023-09-18 LAB — CANCER AG125 SERPL-ACNC: 5 U/ML

## 2023-09-18 PROCEDURE — 99213 OFFICE O/P EST LOW 20 MIN: CPT | Performed by: SPECIALIST

## 2023-09-18 PROCEDURE — 86304 IMMUNOASSAY TUMOR CA 125: CPT

## 2023-09-18 PROCEDURE — 36415 COLL VENOUS BLD VENIPUNCTURE: CPT

## 2023-09-18 PROCEDURE — 81503 ONCO (OVAR) FIVE PROTEINS: CPT

## 2023-09-18 NOTE — PROGRESS NOTES
Loretta Sin today for lab work    Orders Placed This Encounter   Procedures    [address] [facility], Khushboo Osborne MD, Gynecologic Oncology, [place]     On this date 9/18/2023 I have spent 5 minutes reviewing previous notes, test results and face to face (virtual) with the patient discussing the diagnosis and importance of compliance with the treatment plan as well as documenting on the day of the visit. Teddy Weaver, am scribing for, and in the presence of Dr. Daysi Delacruz. Electronically signed by: Raina Anderson 9/18/23 12:09 PM   I agree to the above documentation placed by my scribe Raina Anderson. I reviewed the scribe's note and agree with the documented findings and plan of care. Any areas of disagreement are noted on the chart. I have personally evaluated this patient. Additional findings are as noted.  I agree with the chief complaint, past medical history, past surgical history, allergies, medications, social and family history as documented unless otherwise noted barber

## 2023-09-20 ENCOUNTER — TELEPHONE (OUTPATIENT)
Dept: OBGYN CLINIC | Age: 43
End: 2023-09-20

## 2023-09-20 RX ORDER — FLUCONAZOLE 100 MG/1
100 TABLET ORAL DAILY
Qty: 7 TABLET | Refills: 0 | Status: SHIPPED | OUTPATIENT
Start: 2023-09-20 | End: 2023-09-27

## 2023-09-20 NOTE — TELEPHONE ENCOUNTER
Patient is calling because her yeast infection has not gone away. She is diabetic. Her blood sugars have been running around 120 everyday. She said this is high for her. She is asking if we can call in some more Diflucan to her [address] on [address].

## 2023-09-21 LAB
CANCER AG125 SERPL-ACNC: 5
MALIGNANCY RISK, PELVIC MASS, OVA1 SCORE: 3
PATHOLOGY STUDY: NORMAL
SCORE: NORMAL

## 2023-09-25 ENCOUNTER — TELEPHONE (OUTPATIENT)
Dept: OBGYN CLINIC | Age: 43
End: 2023-09-25

## 2023-09-25 NOTE — TELEPHONE ENCOUNTER
Patient needs a letter to return to work. She doesn't want to loose her job. Dr Louis Aguillon can't get her in until 10-12-23. She has to return to work on 10-17-23. She can't be off work until 10/17/23 because she has no benefits. Patient will call her supevisor to see if they will let her come back sooner with a letter from our office.

## 2023-09-28 ENCOUNTER — TELEPHONE (OUTPATIENT)
Dept: OBGYN CLINIC | Age: 43
End: 2023-09-28

## 2023-09-28 NOTE — TELEPHONE ENCOUNTER
Patient said her work never returned her call to say if she could come back to work earlier. So no new letter was given to the patient for an earlier to return to work date.

## 2023-09-30 ENCOUNTER — HOSPITAL ENCOUNTER (EMERGENCY)
Age: 43
Discharge: ANOTHER HOSPITAL | End: 2023-10-01
Attending: EMERGENCY MEDICINE
Payer: COMMERCIAL

## 2023-09-30 ENCOUNTER — APPOINTMENT (OUTPATIENT)
Dept: CT IMAGING | Age: 43
End: 2023-09-30
Payer: COMMERCIAL

## 2023-09-30 ENCOUNTER — APPOINTMENT (OUTPATIENT)
Dept: GENERAL RADIOLOGY | Age: 43
End: 2023-09-30
Payer: COMMERCIAL

## 2023-09-30 DIAGNOSIS — J36 PERITONSILLAR ABSCESS: Primary | ICD-10-CM

## 2023-09-30 LAB
ANION GAP SERPL CALCULATED.3IONS-SCNC: 15 MMOL/L (ref 9–17)
BASOPHILS # BLD: 0.1 K/UL (ref 0–0.2)
BASOPHILS NFR BLD: 1 % (ref 0–2)
BUN SERPL-MCNC: 8 MG/DL (ref 6–20)
CALCIUM SERPL-MCNC: 9.8 MG/DL (ref 8.6–10.4)
CHLORIDE SERPL-SCNC: 101 MMOL/L (ref 98–107)
CO2 SERPL-SCNC: 23 MMOL/L (ref 20–31)
CREAT SERPL-MCNC: 1.1 MG/DL (ref 0.5–0.9)
D DIMER PPP FEU-MCNC: 3.83 UG/ML FEU (ref 0–0.59)
EOSINOPHIL # BLD: 0.2 K/UL (ref 0–0.4)
EOSINOPHILS RELATIVE PERCENT: 2 % (ref 0–4)
ERYTHROCYTE [DISTWIDTH] IN BLOOD BY AUTOMATED COUNT: 14.8 % (ref 11.5–14.9)
GFR SERPL CREATININE-BSD FRML MDRD: >60 ML/MIN/1.73M2
GLUCOSE SERPL-MCNC: 106 MG/DL (ref 70–99)
HCT VFR BLD AUTO: 42.6 % (ref 36–46)
HGB BLD-MCNC: 13.8 G/DL (ref 12–16)
LYMPHOCYTES NFR BLD: 2.1 K/UL (ref 1–4.8)
LYMPHOCYTES RELATIVE PERCENT: 21 % (ref 24–44)
MCH RBC QN AUTO: 28.1 PG (ref 26–34)
MCHC RBC AUTO-ENTMCNC: 32.5 G/DL (ref 31–37)
MCV RBC AUTO: 86.5 FL (ref 80–100)
MONOCYTES NFR BLD: 0.6 K/UL (ref 0.1–1.3)
MONOCYTES NFR BLD: 6 % (ref 1–7)
NEUTROPHILS NFR BLD: 70 % (ref 36–66)
NEUTS SEG NFR BLD: 7 K/UL (ref 1.3–9.1)
PLATELET # BLD AUTO: 441 K/UL (ref 150–450)
PMV BLD AUTO: 8.2 FL (ref 6–12)
POTASSIUM SERPL-SCNC: 3.8 MMOL/L (ref 3.7–5.3)
RBC # BLD AUTO: 4.93 M/UL (ref 4–5.2)
SODIUM SERPL-SCNC: 139 MMOL/L (ref 135–144)
SPECIMEN SOURCE: NORMAL
STREP A, MOLECULAR: NEGATIVE
TROPONIN I SERPL HS-MCNC: <6 NG/L (ref 0–14)
WBC OTHER # BLD: 9.9 K/UL (ref 3.5–11)

## 2023-09-30 PROCEDURE — 71260 CT THORAX DX C+: CPT

## 2023-09-30 PROCEDURE — 99285 EMERGENCY DEPT VISIT HI MDM: CPT

## 2023-09-30 PROCEDURE — 80048 BASIC METABOLIC PNL TOTAL CA: CPT

## 2023-09-30 PROCEDURE — 36415 COLL VENOUS BLD VENIPUNCTURE: CPT

## 2023-09-30 PROCEDURE — 71045 X-RAY EXAM CHEST 1 VIEW: CPT

## 2023-09-30 PROCEDURE — 6360000002 HC RX W HCPCS

## 2023-09-30 PROCEDURE — 70491 CT SOFT TISSUE NECK W/DYE: CPT

## 2023-09-30 PROCEDURE — 83605 ASSAY OF LACTIC ACID: CPT

## 2023-09-30 PROCEDURE — 87040 BLOOD CULTURE FOR BACTERIA: CPT

## 2023-09-30 PROCEDURE — 85379 FIBRIN DEGRADATION QUANT: CPT

## 2023-09-30 PROCEDURE — 96365 THER/PROPH/DIAG IV INF INIT: CPT

## 2023-09-30 PROCEDURE — 6360000004 HC RX CONTRAST MEDICATION

## 2023-09-30 PROCEDURE — 96361 HYDRATE IV INFUSION ADD-ON: CPT

## 2023-09-30 PROCEDURE — 87651 STREP A DNA AMP PROBE: CPT

## 2023-09-30 PROCEDURE — 2580000003 HC RX 258

## 2023-09-30 PROCEDURE — 96360 HYDRATION IV INFUSION INIT: CPT

## 2023-09-30 PROCEDURE — 85025 COMPLETE CBC W/AUTO DIFF WBC: CPT

## 2023-09-30 PROCEDURE — 96375 TX/PRO/DX INJ NEW DRUG ADDON: CPT

## 2023-09-30 PROCEDURE — 2580000003 HC RX 258: Performed by: EMERGENCY MEDICINE

## 2023-09-30 PROCEDURE — 6360000002 HC RX W HCPCS: Performed by: EMERGENCY MEDICINE

## 2023-09-30 PROCEDURE — 84484 ASSAY OF TROPONIN QUANT: CPT

## 2023-09-30 RX ORDER — 0.9 % SODIUM CHLORIDE 0.9 %
100 INTRAVENOUS SOLUTION INTRAVENOUS ONCE
Status: COMPLETED | OUTPATIENT
Start: 2023-09-30 | End: 2023-09-30

## 2023-09-30 RX ORDER — 0.9 % SODIUM CHLORIDE 0.9 %
1000 INTRAVENOUS SOLUTION INTRAVENOUS ONCE
Status: COMPLETED | OUTPATIENT
Start: 2023-09-30 | End: 2023-09-30

## 2023-09-30 RX ORDER — KETOROLAC TROMETHAMINE 30 MG/ML
30 INJECTION, SOLUTION INTRAMUSCULAR; INTRAVENOUS ONCE
Status: COMPLETED | OUTPATIENT
Start: 2023-09-30 | End: 2023-09-30

## 2023-09-30 RX ORDER — DEXAMETHASONE SODIUM PHOSPHATE 10 MG/ML
10 INJECTION, SOLUTION INTRAMUSCULAR; INTRAVENOUS ONCE
Status: COMPLETED | OUTPATIENT
Start: 2023-09-30 | End: 2023-09-30

## 2023-09-30 RX ORDER — SODIUM CHLORIDE 0.9 % (FLUSH) 0.9 %
10 SYRINGE (ML) INJECTION PRN
Status: DISCONTINUED | OUTPATIENT
Start: 2023-09-30 | End: 2023-10-01 | Stop reason: HOSPADM

## 2023-09-30 RX ORDER — 0.9 % SODIUM CHLORIDE 0.9 %
1000 INTRAVENOUS SOLUTION INTRAVENOUS ONCE
Status: COMPLETED | OUTPATIENT
Start: 2023-09-30 | End: 2023-10-01

## 2023-09-30 RX ADMIN — SODIUM CHLORIDE 1000 ML: 9 INJECTION, SOLUTION INTRAVENOUS at 21:29

## 2023-09-30 RX ADMIN — IOPAMIDOL 150 ML: 755 INJECTION, SOLUTION INTRAVENOUS at 22:09

## 2023-09-30 RX ADMIN — SODIUM CHLORIDE 1000 ML: 9 INJECTION, SOLUTION INTRAVENOUS at 23:53

## 2023-09-30 RX ADMIN — KETOROLAC TROMETHAMINE 30 MG: 30 INJECTION, SOLUTION INTRAMUSCULAR; INTRAVENOUS at 22:57

## 2023-09-30 RX ADMIN — SODIUM CHLORIDE, PRESERVATIVE FREE 10 ML: 5 INJECTION INTRAVENOUS at 22:14

## 2023-09-30 RX ADMIN — SODIUM CHLORIDE 200 ML: 9 INJECTION, SOLUTION INTRAVENOUS at 22:15

## 2023-09-30 RX ADMIN — DEXAMETHASONE SODIUM PHOSPHATE 10 MG: 10 INJECTION INTRAMUSCULAR; INTRAVENOUS at 22:47

## 2023-09-30 RX ADMIN — SODIUM CHLORIDE 3000 MG: 900 INJECTION INTRAVENOUS at 22:46

## 2023-09-30 ASSESSMENT — ENCOUNTER SYMPTOMS
SHORTNESS OF BREATH: 0
VOMITING: 0
NAUSEA: 0
ABDOMINAL PAIN: 0
TROUBLE SWALLOWING: 1
COUGH: 0
SORE THROAT: 1
VOICE CHANGE: 1
SINUS PAIN: 1
WHEEZING: 0

## 2023-09-30 ASSESSMENT — PAIN - FUNCTIONAL ASSESSMENT: PAIN_FUNCTIONAL_ASSESSMENT: 0-10

## 2023-09-30 ASSESSMENT — LIFESTYLE VARIABLES
HOW OFTEN DO YOU HAVE A DRINK CONTAINING ALCOHOL: NEVER
HOW MANY STANDARD DRINKS CONTAINING ALCOHOL DO YOU HAVE ON A TYPICAL DAY: PATIENT DOES NOT DRINK

## 2023-09-30 ASSESSMENT — PATIENT HEALTH QUESTIONNAIRE - PHQ9
2. FEELING DOWN, DEPRESSED OR HOPELESS: 0
SUM OF ALL RESPONSES TO PHQ QUESTIONS 1-9: 0
1. LITTLE INTEREST OR PLEASURE IN DOING THINGS: 0
SUM OF ALL RESPONSES TO PHQ QUESTIONS 1-9: 0
SUM OF ALL RESPONSES TO PHQ9 QUESTIONS 1 & 2: 0

## 2023-09-30 ASSESSMENT — PAIN SCALES - GENERAL: PAINLEVEL_OUTOF10: 10

## 2023-09-30 ASSESSMENT — PAIN DESCRIPTION - LOCATION: LOCATION: CHEST

## 2023-10-01 ENCOUNTER — HOSPITAL ENCOUNTER (EMERGENCY)
Age: 43
Discharge: HOME | End: 2023-10-01
Attending: EMERGENCY MEDICINE
Payer: COMMERCIAL

## 2023-10-01 VITALS
RESPIRATION RATE: 17 BRPM | OXYGEN SATURATION: 97 % | HEART RATE: 67 BPM | SYSTOLIC BLOOD PRESSURE: 134 MMHG | TEMPERATURE: 99.2 F | DIASTOLIC BLOOD PRESSURE: 95 MMHG

## 2023-10-01 VITALS
TEMPERATURE: 98 F | OXYGEN SATURATION: 98 % | WEIGHT: 280 LBS | DIASTOLIC BLOOD PRESSURE: 92 MMHG | HEIGHT: 61 IN | BODY MASS INDEX: 52.87 KG/M2 | RESPIRATION RATE: 17 BRPM | HEART RATE: 94 BPM | SYSTOLIC BLOOD PRESSURE: 141 MMHG

## 2023-10-01 DIAGNOSIS — J36 PERITONSILLAR ABSCESS: Primary | ICD-10-CM

## 2023-10-01 LAB — LACTATE BLDV-SCNC: 1 MMOL/L (ref 0.5–1.9)

## 2023-10-01 PROCEDURE — 6360000002 HC RX W HCPCS: Performed by: EMERGENCY MEDICINE

## 2023-10-01 PROCEDURE — 6360000002 HC RX W HCPCS: Performed by: HEALTH CARE PROVIDER

## 2023-10-01 PROCEDURE — 10060 I&D ABSCESS SIMPLE/SINGLE: CPT

## 2023-10-01 PROCEDURE — 99284 EMERGENCY DEPT VISIT MOD MDM: CPT

## 2023-10-01 PROCEDURE — 96374 THER/PROPH/DIAG INJ IV PUSH: CPT | Performed by: EMERGENCY MEDICINE

## 2023-10-01 PROCEDURE — 2500000003 HC RX 250 WO HCPCS: Performed by: OTOLARYNGOLOGY

## 2023-10-01 PROCEDURE — 99284 EMERGENCY DEPT VISIT MOD MDM: CPT | Performed by: EMERGENCY MEDICINE

## 2023-10-01 PROCEDURE — 96375 TX/PRO/DX INJ NEW DRUG ADDON: CPT | Performed by: EMERGENCY MEDICINE

## 2023-10-01 RX ORDER — MORPHINE SULFATE 4 MG/ML
4 INJECTION, SOLUTION INTRAMUSCULAR; INTRAVENOUS ONCE
Status: COMPLETED | OUTPATIENT
Start: 2023-10-01 | End: 2023-10-01

## 2023-10-01 RX ORDER — MIDAZOLAM HYDROCHLORIDE 2 MG/2ML
1 INJECTION, SOLUTION INTRAMUSCULAR; INTRAVENOUS ONCE
Status: COMPLETED | OUTPATIENT
Start: 2023-10-01 | End: 2023-10-01

## 2023-10-01 RX ORDER — KETOROLAC TROMETHAMINE 30 MG/ML
30 INJECTION, SOLUTION INTRAMUSCULAR; INTRAVENOUS ONCE
Status: COMPLETED | OUTPATIENT
Start: 2023-10-01 | End: 2023-10-01

## 2023-10-01 RX ORDER — AMOXICILLIN AND CLAVULANATE POTASSIUM 875; 125 MG/1; MG/1
1 TABLET, FILM COATED ORAL 2 TIMES DAILY
Qty: 20 TABLET | Refills: 0 | Status: SHIPPED | OUTPATIENT
Start: 2023-10-01 | End: 2023-10-11

## 2023-10-01 RX ORDER — LIDOCAINE HYDROCHLORIDE AND EPINEPHRINE 10; 10 MG/ML; UG/ML
20 INJECTION, SOLUTION INFILTRATION; PERINEURAL ONCE
Status: COMPLETED | OUTPATIENT
Start: 2023-10-01 | End: 2023-10-01

## 2023-10-01 RX ADMIN — MIDAZOLAM 1 MG: 1 INJECTION INTRAMUSCULAR; INTRAVENOUS at 08:20

## 2023-10-01 RX ADMIN — MORPHINE SULFATE 4 MG: 4 INJECTION INTRAVENOUS at 03:50

## 2023-10-01 RX ADMIN — KETOROLAC TROMETHAMINE 30 MG: 30 INJECTION, SOLUTION INTRAMUSCULAR; INTRAVENOUS at 10:06

## 2023-10-01 RX ADMIN — LIDOCAINE HYDROCHLORIDE AND EPINEPHRINE 20 ML: 10; 10 INJECTION, SOLUTION INFILTRATION; PERINEURAL at 08:15

## 2023-10-01 ASSESSMENT — ENCOUNTER SYMPTOMS
VOMITING: 0
BACK PAIN: 0
ABDOMINAL PAIN: 0
CONSTIPATION: 0
SHORTNESS OF BREATH: 0
SINUS PRESSURE: 0
NAUSEA: 0
SORE THROAT: 1
WHEEZING: 0
DIARRHEA: 0

## 2023-10-01 ASSESSMENT — PAIN SCALES - GENERAL
PAINLEVEL_OUTOF10: 10
PAINLEVEL_OUTOF10: 6
PAINLEVEL_OUTOF10: 4
PAINLEVEL_OUTOF10: 7

## 2023-10-01 ASSESSMENT — PAIN DESCRIPTION - PAIN TYPE: TYPE: ACUTE PAIN

## 2023-10-01 ASSESSMENT — PAIN DESCRIPTION - LOCATION: LOCATION: MOUTH

## 2023-10-01 ASSESSMENT — PAIN - FUNCTIONAL ASSESSMENT: PAIN_FUNCTIONAL_ASSESSMENT: 0-10

## 2023-10-01 NOTE — ED TRIAGE NOTES
Pt moved to ED 17 by EMS from [facility].  Pt ambulated from the stretcher to the bed. Pt is a/o x4. Pt stated that on Sunday she started to have a sore throat, went to an Urgent Care and was positive for strep. Pt was put on PCN, stated it did not help and pain got worse so she went to [facility]. Pt found to have a peritonsillar abscess and transferred here. Pt placed on full cardiac monitor. Vitals assessed and noted. NAD noted. Pt stated her voice sounds deeper than normal.  Call light provided. Care ongoing.

## 2023-10-01 NOTE — DISCHARGE INSTRUCTIONS
You are seen in the ER today for your abscess in your tonsil. This was drained by the ENT physician at bedside. At this time, you are tolerating p.o. intake and stable to be discharged. We will discharge you home with a prescription for an antibiotic called Augmentin. Please take this twice a day for 7 days. Please return to the ER if you develop any difficulty swallowing, inability eat or drink, difficulty breathing, or any other concerning symptoms. Otherwise, please follow-up your primary care provider in the next 3 to 5 days to be reassessed. PLEASE RETURN TO THE EMERGENCY DEPARTMENT IMMEDIATELY if you develop any concerning symptoms such as: chest pain, shortness of breath, feeling like your heart is racing, high fever not relieved by acetaminophen (Tylenol) and/or ibuprofen (Motrin / Advil), chills, persistent nausea and/or vomiting, loss of consciousness, numbness, weakness or tingling in the arms or legs or change in color of the extremities, changes in mental status, persistent or severe headache, blurry vision, loss of bladder / bowel control, unable to follow up with your physician, or other any other care or concern.

## 2023-10-01 NOTE — ED NOTES
Sue Villar  [birth year]  40 yo F  Sore throat-  Started on penicillin 6 days ago  Voice changes   Hot potato voice   PTA   ENT consulted  Unasyn   Decadron  Fluids     Miah Merrill RN  10/01/23 4447

## 2023-10-01 NOTE — CONSULTS
CONSULTING SERVICE: Otolaryngology-Head and Neck Surgery    Informant:   The history was obtained from the patient. Chief Complaint:   Her chief complaint is left PTA    History of Present Illness:   Carla Taveras is a 41 y.o. female seen consultation at the request of ED on 10/1/2023. This patient had strep pharyngitis diagnosed this week. Penicillin was prescribed for treatment, but pain worsened on the left and radiated to the left ear and accompanied by muffled voice. She presented to [facility] ED and transferred to [facility] for I&D of left PTA diagnosed on CT. Other Pertinent ENT-specific HPI:  None    Pertinent Social/Birth/Family/Medical/Surgical History   None    Examination:   Vital Signs   Vitals:    10/01/23 0139 10/01/23 0339 10/01/23 0437 10/01/23 0631   BP:       Pulse: 97 84 74 67   Resp: 19 17 18 17   Temp:       TempSrc:       SpO2: 97%          Constitutional   General Appearance: well developed and well nourished and in no acute distress  Speech: age appropriate    Head & Face   Head:   normocephalic and symmetric  Eyes: no eyelid swelling, no conjunctival injection or exudate, pupils equal round and reactive to light    Ears   Right EXT: normal  Right EAC: patent  Right TM: normal landmarks and mobility    Left EXT: normal  Left EAC: patent  Left TM: normal landmarks and mobility    Hearing: is responsive to whispered voice. Nose   Dorsum: dorsum midline  Nasal mucosa: no edema. Rhinorrhea: no drainage  Septum: midline.  No perforation  Turbinates: no inferior turbinate hypertrophy  Nasopharynx Unable to perform indirect mirror laryngoscopy due to patient age and intolerance of exam    Oral Cavity, Oropharynx   Lips: normal  Dentition: dentition grossly normal   Oral mucosa: moist, pink  Gums: normal  Palate: intact, mobile  Pharynx:  left soft palate and tonsil fullness with fluctuance and tenderness  Posterior pharyngeal wall: normal  Tongue: intact, full

## 2023-10-01 NOTE — ED TRIAGE NOTES
Mode of arrival (squad #, walk in, police, etc) : walk in        Chief complaint(s): Chest pain        Arrival Note (brief scenario, treatment PTA, etc). : pt was dx with strep on Wednesday given pcn. Pt states she took 3 days worth now having chest pain. Pt is A&Ox4.        C= \"Have you ever felt that you should Cut down on your drinking? \"  No  A= \"Have people Annoyed you by criticizing your drinking? \"  No  G= \"Have you ever felt bad or Guilty about your drinking? \"  No  E= \"Have you ever had a drink as an Eye-opener first thing in the morning to steady your nerves or to help a hangover? \"  No      Deferred []      Reason for deferring: N/A    *If yes to two or more: probable alcohol abuse. *

## 2023-10-01 NOTE — ED NOTES
ENT resident completed  bedside I&D of peritonsillar abscess. Patient given water per request. Patient resting comfortably and in no acute distress. Respirations even and nonlabored. Patient denies any needs at this time. Bed in lowest position. Call light within reach. Will continue to monitor.      Sallie Mayorga RN  10/01/23 1629

## 2023-10-06 LAB
MICROORGANISM SPEC CULT: NORMAL
MICROORGANISM SPEC CULT: NORMAL
SERVICE CMNT-IMP: NORMAL
SERVICE CMNT-IMP: NORMAL
SPECIMEN DESCRIPTION: NORMAL
SPECIMEN DESCRIPTION: NORMAL

## 2023-10-13 ENCOUNTER — TELEPHONE (OUTPATIENT)
Dept: GYNECOLOGIC ONCOLOGY | Age: 43
End: 2023-10-13

## 2023-10-14 PROBLEM — Z01.419 WELL WOMAN EXAM: Status: RESOLVED | Noted: 2021-10-04 | Resolved: 2023-10-14

## 2023-10-17 ENCOUNTER — HOSPITAL ENCOUNTER (OUTPATIENT)
Age: 43
Setting detail: SPECIMEN
Discharge: HOME | End: 2023-10-17

## 2023-10-17 ENCOUNTER — OFFICE VISIT (OUTPATIENT)
Dept: GYNECOLOGIC ONCOLOGY | Age: 43
End: 2023-10-17

## 2023-10-17 VITALS
WEIGHT: 272.4 LBS | HEART RATE: 94 BPM | HEIGHT: 61 IN | BODY MASS INDEX: 51.43 KG/M2 | OXYGEN SATURATION: 99 % | SYSTOLIC BLOOD PRESSURE: 120 MMHG | DIASTOLIC BLOOD PRESSURE: 84 MMHG | TEMPERATURE: 97.1 F

## 2023-10-17 DIAGNOSIS — R19.00 PELVIC MASS: Primary | ICD-10-CM

## 2023-10-17 DIAGNOSIS — N89.8 VAGINAL DISCHARGE: ICD-10-CM

## 2023-10-17 LAB
CANDIDA SPECIES: POSITIVE
GARDNERELLA VAGINALIS: POSITIVE
SOURCE: ABNORMAL
TRICHOMONAS: NEGATIVE

## 2023-10-17 RX ORDER — LORATADINE 10 MG/1
10 TABLET ORAL DAILY
COMMUNITY

## 2023-10-17 RX ORDER — TRAMADOL HYDROCHLORIDE 50 MG/1
50 TABLET ORAL DAILY PRN
COMMUNITY
End: 2023-10-17 | Stop reason: SDUPTHER

## 2023-10-17 RX ORDER — TRAMADOL HYDROCHLORIDE 50 MG/1
TABLET ORAL 3 TIMES DAILY
COMMUNITY
Start: 2023-09-22

## 2023-10-17 RX ORDER — POTASSIUM CHLORIDE 750 MG/1
10 CAPSULE, EXTENDED RELEASE ORAL DAILY
COMMUNITY

## 2023-10-17 RX ORDER — FLUCONAZOLE 150 MG/1
1 TABLET ORAL DAILY
COMMUNITY
Start: 2023-09-12

## 2023-10-27 ENCOUNTER — HOSPITAL ENCOUNTER (OUTPATIENT)
Dept: MRI IMAGING | Facility: CLINIC | Age: 43
Discharge: HOME | End: 2023-10-27
Payer: COMMERCIAL

## 2023-10-27 DIAGNOSIS — R19.00 PELVIC MASS: ICD-10-CM

## 2023-10-27 PROCEDURE — A9579 GAD-BASE MR CONTRAST NOS,1ML: HCPCS | Performed by: OBSTETRICS & GYNECOLOGY

## 2023-10-27 PROCEDURE — 6360000004 HC RX CONTRAST MEDICATION: Performed by: OBSTETRICS & GYNECOLOGY

## 2023-10-27 PROCEDURE — 2580000003 HC RX 258: Performed by: OBSTETRICS & GYNECOLOGY

## 2023-10-27 PROCEDURE — 72197 MRI PELVIS W/O & W/DYE: CPT

## 2023-10-27 RX ORDER — SODIUM CHLORIDE 0.9 % (FLUSH) 0.9 %
10 SYRINGE (ML) INJECTION PRN
Status: DISCONTINUED | OUTPATIENT
Start: 2023-10-27 | End: 2023-10-30 | Stop reason: HOSPADM

## 2023-10-27 RX ADMIN — Medication 10 ML: at 09:58

## 2023-10-27 RX ADMIN — GADOTERIDOL 20 ML: 279.3 INJECTION, SOLUTION INTRAVENOUS at 09:58

## 2023-10-30 ENCOUNTER — TELEPHONE (OUTPATIENT)
Dept: OBGYN CLINIC | Age: 43
End: 2023-10-30

## 2023-10-30 ENCOUNTER — TELEPHONE (OUTPATIENT)
Dept: GYNECOLOGIC ONCOLOGY | Age: 43
End: 2023-10-30

## 2023-10-30 DIAGNOSIS — R19.00 PELVIC MASS IN FEMALE: Primary | ICD-10-CM

## 2023-10-30 NOTE — TELEPHONE ENCOUNTER
Pt called asking why her MRI results from Friday are not completed yet. Writer informed her it can take several business days for results to be finalized. Writer also attempted to reschedule 11/3/23 appt to discuss those results due to the results not being finalized and the provider will be out of the office. Pt then stated she is unhappy we keep \"stringing her along, and that she does not have time to be rescheduling every appt with us\". Pt then stated she was going to see another provider and hung up.

## 2023-10-30 NOTE — TELEPHONE ENCOUNTER
Patient wants a new referral to a different doctor. She wants to go Dr Amy Hudson. [address]    Fax [phone]. Please fax the referral to this number. She doesn't want see Dr Joseph Fortune, because she keeps rescheduling.

## 2024-08-10 NOTE — PROGRESS NOTES
[facility] Gynecologic Oncology  [address] [facility] [address]  [place] [address]    GYNECOLOGIC ONCOLOGY   NEW PATIENT NOTE    PATIENT NAME:  Loren Triplett  MRN:                     [id number]  DATE:                   10/17/2023    REASON FOR VISIT:     New Patient Visit and Pelvic Mass    HISTORY OF PRESENT ILLNESS:     Loren Triplett is a 42yo, who presents as a new patient referral from Dr. Saqib Elizabeth, regarding further evaluation and management of a newly diagnosed 11cm pelvic mass, based on TVUS (9/14/23). She presents, unaccompanied, to the office today.    The patient reports to experience symptoms of urinary incontinence, abdominal and pelvic pain, and back pain. She reports to experience symptoms of urinary urgency with the Lasix and experiences dribbling after urination    She was diagnosed with a peritonsillar abscess on 10/01/23 which was drained by ENT in ED. She is currently finishing up antibiotics.    She reports to experience symptoms of early satiety and intermittent nausea / vomiting since March 2022. She reports that she has not been evaluated by her PCP or GI at this point.    She denies taking any chronic analgesic medications. She reports to have normal bowel movements, without constipation or diarrhea. She denies to have any symptoms of fatigue. She denies to have any fevers or chills. She denies to have any symptoms of vaginal bleeding or discharge    TREATMENT SUMMARY:  THESE RECORDS HAVE BEEN REVIEWED, UNLESS OTHERWISE INDICATED     Pelvic mass (Diagnosed 2023)  The patient reports to have experienced symptoms of abdominal, pelvic pain, back pain and urinary urgency, with incontinence.     DIAGNOSTIC STUDIES:  IMAGING EVALUATION:  Pelvic Ultrasound (09/14/23). Transabdominal and Transvaginal Views  11.04x7.23x10.55cm LEFT   No free fluid  CT OF THE ABDOMEN AND PELVIS WITHOUT CONTRAST 9/8/2023  Per additional history from the clinician, the patient has

## 2025-02-11 ENCOUNTER — HOSPITAL ENCOUNTER (EMERGENCY)
Age: 45
Discharge: HOME | End: 2025-02-11
Attending: EMERGENCY MEDICINE
Payer: MEDICAID

## 2025-02-11 VITALS
TEMPERATURE: 97.6 F | OXYGEN SATURATION: 99 % | HEIGHT: 61 IN | BODY MASS INDEX: 47.2 KG/M2 | WEIGHT: 250 LBS | SYSTOLIC BLOOD PRESSURE: 129 MMHG | HEART RATE: 72 BPM | RESPIRATION RATE: 18 BRPM | DIASTOLIC BLOOD PRESSURE: 79 MMHG

## 2025-02-11 DIAGNOSIS — M25.532 LEFT WRIST PAIN: Primary | ICD-10-CM

## 2025-02-11 PROCEDURE — 99284 EMERGENCY DEPT VISIT MOD MDM: CPT

## 2025-02-11 PROCEDURE — 6360000002 HC RX W HCPCS: Performed by: EMERGENCY MEDICINE

## 2025-02-11 PROCEDURE — 96372 THER/PROPH/DIAG INJ SC/IM: CPT

## 2025-02-11 RX ORDER — IBUPROFEN 600 MG/1
600 TABLET, FILM COATED ORAL 3 TIMES DAILY PRN
Qty: 30 TABLET | Refills: 0 | Status: SHIPPED | OUTPATIENT
Start: 2025-02-11

## 2025-02-11 RX ORDER — KETOROLAC TROMETHAMINE 30 MG/ML
30 INJECTION, SOLUTION INTRAMUSCULAR; INTRAVENOUS ONCE
Status: COMPLETED | OUTPATIENT
Start: 2025-02-11 | End: 2025-02-11

## 2025-02-11 RX ORDER — METHOCARBAMOL 750 MG/1
750 TABLET, FILM COATED ORAL 4 TIMES DAILY
Qty: 40 TABLET | Refills: 0 | Status: SHIPPED | OUTPATIENT
Start: 2025-02-11 | End: 2025-02-21

## 2025-02-11 RX ADMIN — KETOROLAC TROMETHAMINE 30 MG: 30 INJECTION, SOLUTION INTRAMUSCULAR at 07:26

## 2025-02-11 ASSESSMENT — PAIN DESCRIPTION - ORIENTATION: ORIENTATION: LEFT

## 2025-02-11 ASSESSMENT — PAIN DESCRIPTION - LOCATION: LOCATION: WRIST

## 2025-02-11 ASSESSMENT — PAIN SCALES - GENERAL: PAINLEVEL_OUTOF10: 9

## 2025-02-11 ASSESSMENT — PAIN - FUNCTIONAL ASSESSMENT: PAIN_FUNCTIONAL_ASSESSMENT: 0-10

## 2025-02-11 NOTE — ED PROVIDER NOTES
that she has quit smoking. She has never used smokeless tobacco. She reports current alcohol use. She reports current drug use. Drug: Marijuana (Weed).  PHYSICAL EXAM     INITIAL VITALS: /79   Pulse 72   Temp 97.6 °F (36.4 °C) (Oral)   Resp 18   Ht 1.549 m (5' 1\")   Wt 113.4 kg (250 lb)   LMP 02/11/2022 (Approximate)   SpO2 99%   BMI 47.24 kg/m²      General: NAD  Head: Normocephalic  Eyes: No scleral icterus  ENT: No dry mucus membranes  Neck: Supple  Lungs: Clear to ascultation bilaterally, no wheeze, no rales, no rhonchi  Cardiac: Regular rate and rhythm, S1/S2, no murmurs  Abdominal: Soft, nondistended, nontender, no rebound, no guarding.  Extremities: No edema left posterior wrist with soft tissue swelling and mild tenderness to palpation, left trapezius tenderness to palpation, negative left Spurling's  Rectal: Deferred  Genitourinary: Deferred  Skin: No rash  Neuro: AOx4, no decreased LOC  Psych: No anxiety  Perfusion: Warm x 4      MEDICAL DECISION MAKING:   MDM    Patient presents emergency room with complaints of acute on chronic left wrist swelling and pain.  Patient describes the pain as shooting up into her left shoulder.  On exam patient does have pain to palpation of the trapezius on the left.  Patient denies any trauma.  Patient is requesting a wrist brace.  Patient was given wrist brace.  Patient was given Toradol IM.  Patient be discharged home with prescription for ibuprofen and muscle relaxants.  Patient will follow-up with orthopedics.  Plan discussed with patient verbalizes understanding and agreement.      CRITICAL CARE:     PROCEDURES:    Procedures    DIAGNOSTIC RESULTS   EKG: All EKG's are interpreted by the Emergency Department Physician who either signs or Co-signs this chart in the absence of a cardiologist.      RADIOLOGY:All plain film, CT, MRI, and formal ultrasound images (except ED bedside ultrasound) are read by the radiologist, see reports below, unless otherwise

## 2025-02-20 NOTE — OP NOTE
Operative Note  Department of Obstetrics and Gynecology  [facility]       Patient: Jerel Gagnon   : 1980  MRN: [id number]       Acct: [de-identified]   Date of Procedure: 3/3/22     Pre-operative Diagnosis: 46 y.o. female [id number]   Dysmenorrhea  Menorrhagia  Uterine Fibroids    Post-operative Diagnosis: Same as above    Procedure: Robotic assisted laparoscopic hysterectomy, bilateral salpingectomy, cystoscopy    Surgeon: Dr. Lucien Bryan): Faith Tarango, PGY3; Harmeet Hudson, PGY1    Anesthesia: general via ET tube    Indications: Patient has a history of irregular, heavy menstrual cycles. She also endorses dysmenorrhea and dyspareunia. She also had uterine fibroids diagnosed in 2018. A TVUS in 2021 demonstrated a [id number] fibroid. She wishes to proceed with surgical management. Procedure Details: The patient was seen in the pre-op room. The risks, benefits, complications, treatment options, and expected outcomes were discussed with the patient. The patient concurred with the proposed plan, giving informed consent. The patient was taken to the Operating Room and identified as Jerel Gagnon and the procedure was verified. A Time Out was held and the above information confirmed. The patient received preoperative antibiotics. Then the patient was taken to the operating room where general endotracheal anesthesia was administered without any complications. The patient was prepped and draped in a dorsal lithotomy position in [place] stirrups in normal sterile fashion. Time out was undergone. A weighted speculum was placed in vagina and the anterior lip of the cervix was grasped with a single-tooth tenaculum. The cervix was dilated slightly with hegar dilators and the 3cm  uterine manipulator was placed without any complications, and the tenaculum was removed as well as the weighed speculum.  Jurado catheter was placed in the bladder and the patient's legs were lowered. Gloves were then changed and attention was turned to the abdomen. All trocar sites were injected pre-emptively with 0.5% Marcaine. An 8-mm skin incision was made at the umbilicus and a Veress needle was placed through this incision while tenting up the anterior abdominal wall with perforating towel clamps. Saline bubble drop test was undergone and passed and gas was noted to flow under low pressures. Once pneumoperitoneum was obtained, the Veress needle was removed and the 8 mm camera trocar was placed through this incision while tenting up the anterior abdominal wall. Intra-abdominal placement was confirmed with the laparoscope and the underlying structures visualized were without trauma. There were adhesions noted just inferior and lateral to port placement around the umbilicus which involved the omentum adhered to the anterior abdominal wall. Bowel did not appear to be involved. These were not obstructing the operative field and were left alone. Two additional trocars were placed lateral to either side of the supraumbilical trocar and another 8mm Airseal trocar on the left between the left lateral and supraumbilical trocars, under direct visualization without any complication. The patient was placed in Trendelenburg. The intestines were mobilized out of the posterior cul-de-sac and then the [employer] robot was brought in and docked. Instruments were placed under direct visualization. With the physician at the console, visualization of the pelvis was undergone with below findings. The attention was turned to the left adnexa. The left fallopian tube was cauterized and excised from the surrounding mesosalpinx starting at the fimbriated end to the level of the cornua. The utero-ovarian ligament was cauterized and ligated. Then the broad ligament was opened using bipolar and scissors then dissected down to the round ligament which was cauterized and ligated on the left.   The anterior leaf of the broad ligament was then dissected to the vesicouterine peritoneal reflection and the bladder flap was created. The left uterine vessels were skeletonized and cauterized and cut. The bladder was mobilized inferiorly and then attention was turned to the right adnexa. The right fallopian tube was cauterized and excised from the surrounding mesosalpinx starting at the fimbriated end to the level of the cornua. The utero-ovarian ligament was cauterized and ligated. Then the broad ligament was opened using bipolar and scissors then dissected down to the round ligament which was cauterized and ligated on the right. The anterior leaf of the broad ligament was further dissected to the vesicouterine peritoneal reflection to complete the bladder flap. The bladder was further mobilized inferiorly and the anterior colpotomy cup could be palpated and visualized. The rightt uterine vessels were then skeletonized, cauterized and ligated at the cervical isthmus and a subsequent bite was taken, parallel to the cervix at the cardinal ligament. Colpotomy was made circumferentially starting at the anterior aspect, amputating the cervix from the vagina without any complications. Due to the bulky nature of the uterus, decision was made to bivalve to attempt a myomectomy in order to decreased the size to deliver through the vagina. A small approximately 2cm fibroid was excised in its entirety and removed from the vagina. A large approximately 5cm fibroid at the fundus was started to be excised. As this fibroid was being shelled out, the uterine body collapsed, narrowing the structure. Decision was made to attempt to delivery the rest of the specimen in its entirety which was successful. The vaginal cuff was then closed from right to left using stratafix suture, being careful to incorporate both uterosacral ligaments in the corners and the vaginal mucosa into each suture.  The pelvis was then irrigated/suctioned and excellent hemostasis was noted over the vaginal cuff, which was palpated vaginally and noted to be intact. The cuff was also visualized laparoscopically and noted to be intact and well approximated. All pedicles were inspected with excellent hemostasis noted. There was no other intra-abdominal pathology other than previously mentioned. The Cali catheter was removed and the cystoscope was introduced through the urethra. No bladder injury or suture material was noted. Both ureters were noted to be patent with good flow. The bladder was then drained and the cystoscope removed.       All instruments were removed from the abdomen under direct visualization. The daVinci was undocked and removed from the operative field. All CO2 was removed from the patient's abdomen and trocars were removed. The skin was closed using 4-0 monocryl in the subcuticular fashion and covered with dermabond. Sponge, lap, needle count, and instrument counts were correct x 2. The patient was extubated and taken to the post operative recovery unit in stable condition. Antibiotics were given prior to skin incision. SCDs remained in place for entire operative course and for recovery period. Dr. Donny Leung was present for the entire operation.     Findings:  Normal appearing external genitalia, normal appearing vagina and cervix, normal appearing bilateral fallopian tubes and ovaries, enlarged fibroid uterus with multiple fibroid lesions noted, intra-abdominal adhesions noted between anterior abdominal wall and omentum  Total IV fluids/Blood products:  1200 ml crystalloid  Urine Output:  200 ml    Estimated blood loss:  100ml  Drains:  cali catheter removed at conclusion of case  Specimens:  Uterus, cervix, bilateral fallopian tubes, small 2cm uterine fibroid  Instrument and Sponge Count: Correct  Complications:  none  Condition:  stable, transferred to post anesthesia recovery    Sarah King DO  OB/GYN Resident PGY3  3/3/2022, 10:46 AM [TextEntry] : - Extensive discussion regarding definitive surgical management for symptomatic fibroid uterus - Consent signed for ex-lap/GIRMA/BSO/cystoscopy/all other indicated procedures - Pt will need PCP, PST testing prior to surgery - OR  will coordinate w/ Dr. Solomon's  and call w/ date/time surgery - Of note, extremely elevated BP today. Denies HA/vis changes/CP/SOB. Pt reports no diagnosis of HTN. Strongly encouraged pt to go to hospital given hypertensive emergency and stroke risk. Pt was considering ED evaluation.

## 2025-07-25 ENCOUNTER — HOSPITAL ENCOUNTER (EMERGENCY)
Age: 45
Discharge: HOME | End: 2025-07-25
Attending: EMERGENCY MEDICINE
Payer: MEDICAID

## 2025-07-25 ENCOUNTER — APPOINTMENT (OUTPATIENT)
Dept: CT IMAGING | Age: 45
End: 2025-07-25
Payer: MEDICAID

## 2025-07-25 VITALS
BODY MASS INDEX: 47.24 KG/M2 | WEIGHT: 250 LBS | SYSTOLIC BLOOD PRESSURE: 140 MMHG | HEART RATE: 75 BPM | DIASTOLIC BLOOD PRESSURE: 69 MMHG | OXYGEN SATURATION: 100 % | TEMPERATURE: 98.9 F | RESPIRATION RATE: 18 BRPM

## 2025-07-25 DIAGNOSIS — R04.2 HEMOPTYSIS: Primary | ICD-10-CM

## 2025-07-25 DIAGNOSIS — R10.84 GENERALIZED ABDOMINAL PAIN: ICD-10-CM

## 2025-07-25 LAB
ALBUMIN SERPL-MCNC: 4.2 G/DL (ref 3.5–5.2)
ALBUMIN/GLOB SERPL: 1.3 {RATIO} (ref 1–2.5)
ALP SERPL-CCNC: 90 U/L (ref 35–104)
ALT SERPL-CCNC: 29 U/L (ref 10–35)
ANION GAP SERPL CALCULATED.3IONS-SCNC: 12 MMOL/L (ref 9–16)
AST SERPL-CCNC: 25 U/L (ref 10–35)
BASOPHILS # BLD: 0.03 K/UL (ref 0–0.2)
BASOPHILS NFR BLD: 1 % (ref 0–2)
BILIRUB SERPL-MCNC: 0.2 MG/DL (ref 0–1.2)
BUN SERPL-MCNC: 18 MG/DL (ref 6–20)
CALCIUM SERPL-MCNC: 9.5 MG/DL (ref 8.6–10.4)
CHLORIDE SERPL-SCNC: 104 MMOL/L (ref 98–107)
CO2 SERPL-SCNC: 26 MMOL/L (ref 20–31)
CREAT SERPL-MCNC: 1.1 MG/DL (ref 0.5–0.9)
EOSINOPHIL # BLD: 0.38 K/UL (ref 0–0.44)
EOSINOPHILS RELATIVE PERCENT: 6 % (ref 1–4)
ERYTHROCYTE [DISTWIDTH] IN BLOOD BY AUTOMATED COUNT: 14.3 % (ref 11.8–14.4)
GFR, ESTIMATED: 65 ML/MIN/1.73M2
GLUCOSE SERPL-MCNC: 77 MG/DL (ref 74–99)
HCT VFR BLD AUTO: 37.8 % (ref 36.3–47.1)
HGB BLD-MCNC: 12.8 G/DL (ref 11.9–15.1)
IMM GRANULOCYTES # BLD AUTO: 0.01 K/UL (ref 0–0.3)
IMM GRANULOCYTES NFR BLD: 0 %
LIPASE SERPL-CCNC: 58 U/L (ref 13–60)
LYMPHOCYTES NFR BLD: 2.2 K/UL (ref 1.1–3.7)
LYMPHOCYTES RELATIVE PERCENT: 35 % (ref 24–43)
MCH RBC QN AUTO: 28.6 PG (ref 25.2–33.5)
MCHC RBC AUTO-ENTMCNC: 33.9 G/DL (ref 28.4–34.8)
MCV RBC AUTO: 84.4 FL (ref 82.6–102.9)
MONOCYTES NFR BLD: 0.44 K/UL (ref 0.1–1.2)
MONOCYTES NFR BLD: 7 % (ref 3–12)
NEUTROPHILS NFR BLD: 51 % (ref 36–65)
NEUTS SEG NFR BLD: 3.17 K/UL (ref 1.5–8.1)
NRBC BLD-RTO: 0 PER 100 WBC
PLATELET # BLD AUTO: 263 K/UL (ref 138–453)
PMV BLD AUTO: 10 FL (ref 8.1–13.5)
POTASSIUM SERPL-SCNC: 4.2 MMOL/L (ref 3.7–5.3)
PROT SERPL-MCNC: 7.5 G/DL (ref 6.6–8.7)
RBC # BLD AUTO: 4.48 M/UL (ref 3.95–5.11)
SODIUM SERPL-SCNC: 141 MMOL/L (ref 136–145)
WBC OTHER # BLD: 6.2 K/UL (ref 3.5–11.3)

## 2025-07-25 PROCEDURE — 96375 TX/PRO/DX INJ NEW DRUG ADDON: CPT

## 2025-07-25 PROCEDURE — 99285 EMERGENCY DEPT VISIT HI MDM: CPT

## 2025-07-25 PROCEDURE — 2500000003 HC RX 250 WO HCPCS: Performed by: EMERGENCY MEDICINE

## 2025-07-25 PROCEDURE — 96376 TX/PRO/DX INJ SAME DRUG ADON: CPT

## 2025-07-25 PROCEDURE — 96374 THER/PROPH/DIAG INJ IV PUSH: CPT

## 2025-07-25 PROCEDURE — 83690 ASSAY OF LIPASE: CPT

## 2025-07-25 PROCEDURE — 2580000003 HC RX 258: Performed by: EMERGENCY MEDICINE

## 2025-07-25 PROCEDURE — 6360000002 HC RX W HCPCS: Performed by: EMERGENCY MEDICINE

## 2025-07-25 PROCEDURE — 6370000000 HC RX 637 (ALT 250 FOR IP): Performed by: EMERGENCY MEDICINE

## 2025-07-25 PROCEDURE — 74177 CT ABD & PELVIS W/CONTRAST: CPT

## 2025-07-25 PROCEDURE — 80053 COMPREHEN METABOLIC PANEL: CPT

## 2025-07-25 PROCEDURE — 85025 COMPLETE CBC W/AUTO DIFF WBC: CPT

## 2025-07-25 PROCEDURE — 6360000004 HC RX CONTRAST MEDICATION: Performed by: EMERGENCY MEDICINE

## 2025-07-25 RX ORDER — DICYCLOMINE HYDROCHLORIDE 10 MG/1
10 CAPSULE ORAL ONCE
Status: COMPLETED | OUTPATIENT
Start: 2025-07-25 | End: 2025-07-25

## 2025-07-25 RX ORDER — ONDANSETRON 4 MG/1
4 TABLET, ORALLY DISINTEGRATING ORAL 3 TIMES DAILY PRN
Qty: 21 TABLET | Refills: 0 | Status: SHIPPED | OUTPATIENT
Start: 2025-07-25

## 2025-07-25 RX ORDER — ONDANSETRON 2 MG/ML
4 INJECTION INTRAMUSCULAR; INTRAVENOUS ONCE
Status: COMPLETED | OUTPATIENT
Start: 2025-07-25 | End: 2025-07-25

## 2025-07-25 RX ORDER — DICYCLOMINE HYDROCHLORIDE 10 MG/ML
20 INJECTION INTRAMUSCULAR ONCE
Status: DISCONTINUED | OUTPATIENT
Start: 2025-07-25 | End: 2025-07-25

## 2025-07-25 RX ORDER — 0.9 % SODIUM CHLORIDE 0.9 %
100 INTRAVENOUS SOLUTION INTRAVENOUS ONCE
Status: COMPLETED | OUTPATIENT
Start: 2025-07-25 | End: 2025-07-25

## 2025-07-25 RX ORDER — IOPAMIDOL 755 MG/ML
75 INJECTION, SOLUTION INTRAVASCULAR
Status: COMPLETED | OUTPATIENT
Start: 2025-07-25 | End: 2025-07-25

## 2025-07-25 RX ORDER — SODIUM CHLORIDE 0.9 % (FLUSH) 0.9 %
10 SYRINGE (ML) INJECTION PRN
Status: DISCONTINUED | OUTPATIENT
Start: 2025-07-25 | End: 2025-07-25 | Stop reason: HOSPADM

## 2025-07-25 RX ORDER — DICYCLOMINE HCL 20 MG
20 TABLET ORAL 4 TIMES DAILY
Qty: 20 TABLET | Refills: 0 | Status: SHIPPED | OUTPATIENT
Start: 2025-07-25

## 2025-07-25 RX ADMIN — SODIUM CHLORIDE 40 MG: 9 INJECTION INTRAMUSCULAR; INTRAVENOUS; SUBCUTANEOUS at 19:25

## 2025-07-25 RX ADMIN — IOPAMIDOL 75 ML: 755 INJECTION, SOLUTION INTRAVENOUS at 19:53

## 2025-07-25 RX ADMIN — ONDANSETRON 4 MG: 2 INJECTION, SOLUTION INTRAMUSCULAR; INTRAVENOUS at 21:11

## 2025-07-25 RX ADMIN — SODIUM CHLORIDE, PRESERVATIVE FREE 10 ML: 5 INJECTION INTRAVENOUS at 19:53

## 2025-07-25 RX ADMIN — DICYCLOMINE HYDROCHLORIDE 10 MG: 10 CAPSULE ORAL at 21:11

## 2025-07-25 RX ADMIN — ONDANSETRON 4 MG: 2 INJECTION, SOLUTION INTRAMUSCULAR; INTRAVENOUS at 19:21

## 2025-07-25 RX ADMIN — SODIUM CHLORIDE 100 ML: 9 INJECTION, SOLUTION INTRAVENOUS at 19:53

## 2025-07-25 ASSESSMENT — ENCOUNTER SYMPTOMS
CHEST TIGHTNESS: 0
FACIAL SWELLING: 0
ABDOMINAL PAIN: 1
VOMITING: 1
EYE PAIN: 0
ABDOMINAL DISTENTION: 0
BACK PAIN: 0
BLOOD IN STOOL: 0
ANAL BLEEDING: 0
SHORTNESS OF BREATH: 0
EYE DISCHARGE: 0

## 2025-07-25 ASSESSMENT — PAIN SCALES - GENERAL
PAINLEVEL_OUTOF10: 5
PAINLEVEL_OUTOF10: 9

## 2025-07-25 ASSESSMENT — PAIN DESCRIPTION - ORIENTATION: ORIENTATION: ANTERIOR

## 2025-07-25 ASSESSMENT — PAIN DESCRIPTION - PAIN TYPE: TYPE: ACUTE PAIN

## 2025-07-25 ASSESSMENT — PAIN DESCRIPTION - DESCRIPTORS: DESCRIPTORS: CRAMPING

## 2025-07-25 ASSESSMENT — PAIN - FUNCTIONAL ASSESSMENT
PAIN_FUNCTIONAL_ASSESSMENT: 0-10
PAIN_FUNCTIONAL_ASSESSMENT: ACTIVITIES ARE NOT PREVENTED

## 2025-07-25 ASSESSMENT — PAIN DESCRIPTION - FREQUENCY: FREQUENCY: CONTINUOUS

## 2025-07-25 ASSESSMENT — PAIN DESCRIPTION - ONSET: ONSET: ON-GOING

## 2025-07-25 ASSESSMENT — PAIN DESCRIPTION - LOCATION: LOCATION: ABDOMEN

## 2025-07-25 NOTE — ED PROVIDER NOTES
EMERGENCY DEPARTMENT ENCOUNTER    Pt Name: Loren Triplett  MRN: [id number]  Birthdate 1980  Date of evaluation: 7/25/25  CHIEF COMPLAINT       Chief Complaint   Patient presents with    Abdominal Pain    Hematemesis     Reports bright red      HISTORY OF PRESENT ILLNESS   HPI   The patient is a 45-year-old female with a history of diabetes who presented to the emergency department secondary to abdominal pain as well as hematic emesis.  Patient has symptoms started this morning she had 1 episode of bloody emesis.  She went to work continue to have abdominal pain but no more bloody vomitus.  She denied a previous history of IBS Crohn's or diverticulitis patient had rectal bleeding patient had increased use of NSAIDs is not taking increased dosages of aspirin.  Patient complains of abdominal pain localized to the left lower quadrant.  She had her gallbladder removed.  Patient no chest pain, shortness of breath, nausea, vomiting, fevers or chills      REVIEW OF SYSTEMS     Review of Systems   Constitutional:  Negative for chills, diaphoresis and fever.   HENT:  Negative for congestion, ear pain and facial swelling.    Eyes:  Negative for pain, discharge and visual disturbance.   Respiratory:  Negative for chest tightness and shortness of breath.    Cardiovascular:  Negative for chest pain and palpitations.   Gastrointestinal:  Positive for abdominal pain and vomiting. Negative for abdominal distention, anal bleeding and blood in stool.   Genitourinary:  Negative for difficulty urinating and flank pain.   Musculoskeletal:  Negative for back pain.   Skin:  Negative for wound.   Neurological:  Negative for dizziness, light-headedness and headaches.     PASTMEDICAL HISTORY     Past Medical History:   Diagnosis Date    Arthritis     Bursitis     Diabetes mellitus (HCC)     borderline    Fibroid tumor     uterine    Fibromyalgia     GERD (gastroesophageal reflux disease)     Hypertension     Lupus (HCC)     Migraine

## 2025-08-19 ENCOUNTER — OFFICE VISIT (OUTPATIENT)
Age: 45
End: 2025-08-19

## 2025-08-19 VITALS
WEIGHT: 260 LBS | DIASTOLIC BLOOD PRESSURE: 84 MMHG | OXYGEN SATURATION: 96 % | HEART RATE: 90 BPM | BODY MASS INDEX: 49.09 KG/M2 | RESPIRATION RATE: 15 BRPM | HEIGHT: 61 IN | SYSTOLIC BLOOD PRESSURE: 127 MMHG | TEMPERATURE: 97.7 F

## 2025-08-19 DIAGNOSIS — R30.0 DYSURIA: Primary | ICD-10-CM

## 2025-08-19 DIAGNOSIS — N34.2 URETHRITIS: ICD-10-CM

## 2025-08-19 DIAGNOSIS — B96.89 BACTERIAL VAGINOSIS: ICD-10-CM

## 2025-08-19 DIAGNOSIS — N76.0 BACTERIAL VAGINOSIS: ICD-10-CM

## 2025-08-19 LAB
BILIRUBIN, POC: NEGATIVE
BLOOD URINE, POC: NORMAL
CLARITY, POC: NORMAL
COLOR, POC: YELLOW
CONTROL: NORMAL
GLUCOSE URINE, POC: NEGATIVE MG/DL
KETONES, POC: NEGATIVE MG/DL
LEUKOCYTE EST, POC: NEGATIVE
NITRITE, POC: NEGATIVE
PH, POC: 5
PREGNANCY TEST URINE, POC: NEGATIVE
PROTEIN, POC: NEGATIVE MG/DL
SPECIFIC GRAVITY, POC: 1.02
UROBILINOGEN, POC: 0.2 MG/DL

## 2025-08-19 ASSESSMENT — ENCOUNTER SYMPTOMS
RESPIRATORY NEGATIVE: 1
VOMITING: 0
NAUSEA: 0
DIARRHEA: 1

## 2025-08-21 ENCOUNTER — FOLLOWUP TELEPHONE ENCOUNTER (OUTPATIENT)
Age: 45
End: 2025-08-21

## 2025-08-21 DIAGNOSIS — N76.0 BACTERIAL VAGINOSIS: Primary | ICD-10-CM

## 2025-08-21 DIAGNOSIS — A49.3 MYCOPLASMA INFECTION: ICD-10-CM

## 2025-08-21 DIAGNOSIS — B96.89 BACTERIAL VAGINOSIS: Primary | ICD-10-CM

## 2025-08-21 RX ORDER — METRONIDAZOLE 500 MG/1
500 TABLET ORAL 2 TIMES DAILY
Qty: 14 TABLET | Refills: 0 | OUTPATIENT
Start: 2025-08-21 | End: 2025-08-21

## 2025-08-21 RX ORDER — AZITHROMYCIN 250 MG/1
TABLET, FILM COATED ORAL
Qty: 6 TABLET | Refills: 0 | OUTPATIENT
Start: 2025-08-21 | End: 2025-08-21

## 2025-08-21 RX ORDER — AZITHROMYCIN 250 MG/1
TABLET, FILM COATED ORAL
Qty: 6 TABLET | Refills: 0 | OUTPATIENT
Start: 2025-08-21 | End: 2025-08-22 | Stop reason: SDUPTHER

## 2025-08-21 RX ORDER — METRONIDAZOLE 500 MG/1
500 TABLET ORAL 2 TIMES DAILY
Qty: 14 TABLET | Refills: 0 | OUTPATIENT
Start: 2025-08-21 | End: 2025-08-22 | Stop reason: SDUPTHER

## 2025-08-22 ENCOUNTER — PATIENT MESSAGE (OUTPATIENT)
Age: 45
End: 2025-08-22

## 2025-08-22 ENCOUNTER — FOLLOWUP TELEPHONE ENCOUNTER (OUTPATIENT)
Age: 45
End: 2025-08-22

## 2025-08-22 DIAGNOSIS — B96.89 BACTERIAL VAGINOSIS: ICD-10-CM

## 2025-08-22 DIAGNOSIS — A49.3 MYCOPLASMA INFECTION: ICD-10-CM

## 2025-08-22 DIAGNOSIS — N76.0 BACTERIAL VAGINOSIS: ICD-10-CM

## 2025-08-22 RX ORDER — AZITHROMYCIN 250 MG/1
TABLET, FILM COATED ORAL
Qty: 6 TABLET | Refills: 0 | Status: SHIPPED | OUTPATIENT
Start: 2025-08-22 | End: 2025-08-22 | Stop reason: SDUPTHER

## 2025-08-22 RX ORDER — AZITHROMYCIN 250 MG/1
TABLET, FILM COATED ORAL
Qty: 6 TABLET | Refills: 0 | Status: SHIPPED | OUTPATIENT
Start: 2025-08-22 | End: 2025-09-01

## 2025-08-22 RX ORDER — METRONIDAZOLE 500 MG/1
500 TABLET ORAL 2 TIMES DAILY
Qty: 14 TABLET | Refills: 0 | Status: SHIPPED | OUTPATIENT
Start: 2025-08-22 | End: 2025-08-29

## 2025-08-22 RX ORDER — METRONIDAZOLE 500 MG/1
500 TABLET ORAL 2 TIMES DAILY
Qty: 14 TABLET | Refills: 0 | Status: SHIPPED | OUTPATIENT
Start: 2025-08-22 | End: 2025-08-22 | Stop reason: SDUPTHER

## (undated) DEVICE — COVER,MAYO STAND,XL,STERILE: Brand: MEDLINE

## (undated) DEVICE — GOWN,AURORA,NONREINFORCED,LARGE: Brand: MEDLINE

## (undated) DEVICE — PLUMEPORT LAPAROSCOPIC SMOKE FILTRATION DEVICE: Brand: PLUMEPORT ACTIV

## (undated) DEVICE — SINGLE PORT MANIFOLD: Brand: NEPTUNE 2

## (undated) DEVICE — GLOVE SURG SZ 65 THK91MIL LTX FREE SYN POLYISOPRENE

## (undated) DEVICE — SUTURE V-LOC 180 SZ 0 L9IN ABSRB GRN GS-21 L37MM 1/2 CIR VLOCL0346

## (undated) DEVICE — SUTURE MCRYL + SZ 4-0 L27IN ABSRB UD L19MM PS-2 3/8 CIR MCP426H

## (undated) DEVICE — APPLIER CLP M/L SHFT DIA5MM 15 LIG LIGAMAX 5

## (undated) DEVICE — TROCARS: Brand: KII® BLUNT TIP ACCESS SYSTEM

## (undated) DEVICE — ST CHARLES GYN LAPAROSCOPY PK: Brand: MEDLINE INDUSTRIES, INC.

## (undated) DEVICE — LEGGINGS, PAIR, CLEAR, STERILE: Brand: MEDLINE

## (undated) DEVICE — BITEBLOCK 54FR W/ DENT RIM BLOX

## (undated) DEVICE — GLOVE ORANGE PI 7   MSG9070

## (undated) DEVICE — SOLUTION ANTIFOG VIS SYS CLEARIFY LAPSCP

## (undated) DEVICE — SCISSOR SURG CRV ENDOCUT TIP FOR LAP DISP

## (undated) DEVICE — DEFENDO AIR WATER SUCTION AND BIOPSY VALVE KIT FOR  OLYMPUS: Brand: DEFENDO AIR/WATER/SUCTION AND BIOPSY VALVE

## (undated) DEVICE — DRAPE,REIN 53X77,STERILE: Brand: MEDLINE

## (undated) DEVICE — TIP COVER ACCESSORY

## (undated) DEVICE — TRI-LUMEN FILTERED TUBE SET WITH ACTIVATED CHARCOAL FILTER: Brand: AIRSEAL

## (undated) DEVICE — TOTAL TRAY, 16FR 10ML SIL FOLEY, URN: Brand: MEDLINE

## (undated) DEVICE — FORCEPS BX L240CM WRK CHN 2.8MM STD CAP W/ NDL MIC MESH

## (undated) DEVICE — BAG SPEC REM 224ML W4XL6IN DIA10MM 1 HND GYN DISP ENDOPCH

## (undated) DEVICE — ST CHARLES GEN LAPAROSCOPY PK: Brand: MEDLINE INDUSTRIES, INC.

## (undated) DEVICE — MANIPULATOR UTER 3CM ULTEM PLAS CUP DELINEATOR FOR USE W/

## (undated) DEVICE — SOLUTION IV IRRIG WATER 1000ML POUR BRL 2F7114

## (undated) DEVICE — 3M™ IOBAN™ 2 ANTIMICROBIAL INCISE DRAPE 6650EZ: Brand: IOBAN™ 2

## (undated) DEVICE — Device

## (undated) DEVICE — DISSECTOR LAP DIA5MM BLNT TIP ENDOPATH

## (undated) DEVICE — SYRINGE MED 10ML LUERLOCK TIP W/O SFTY DISP

## (undated) DEVICE — SUTURE STRATAFIX SPRL PDS + SZ 0 L9IN ABSRB VLT CT-1 L36MM SXPP1B455

## (undated) DEVICE — MERCY HEALTH ST CHARLES: Brand: MEDLINE INDUSTRIES, INC.

## (undated) DEVICE — TROCAR: Brand: KII® SLEEVE

## (undated) DEVICE — SYRINGE 20ML LL S/C 50

## (undated) DEVICE — TROCAR: Brand: KII SHIELDED BLADED ACCESS SYSTEM

## (undated) DEVICE — Z DUP USE 2257490 ADHESIVE SKIN CLSRE 036ML TPCL 2CTL CNCRLTE HIGH VSCSTY DRMB

## (undated) DEVICE — ELECTRODE LAPAROSCOPIC LHOOK

## (undated) DEVICE — BLADELESS OBTURATOR: Brand: WECK VISTA

## (undated) DEVICE — PAD PT POS 36 IN SURGYPAD DISP

## (undated) DEVICE — CANNULA SEAL

## (undated) DEVICE — ENDO KIT W/SYRINGE: Brand: MEDLINE INDUSTRIES, INC.

## (undated) DEVICE — SUTURE VCRL + SZ 4-0 L27IN ABSRB WHT FS-2 3/8 CIR REV CUT VCP422H

## (undated) DEVICE — SUTURE VCRL + SZ 0 L27IN ABSRB VLT L26MM UR-6 5/8 CIR VCP603H

## (undated) DEVICE — Z DISCONTINUED PER MEDLINE SOLUTION ANTIFOG 6ML NONABRASIVE NONTOXIC MEDICHOICE

## (undated) DEVICE — AIRSEAL 8 MM ACCESS PORT AND LOW PROFILE OBTURATOR WITH BLADELESS OPTICAL TIP, 120 MM LENGTH: Brand: AIRSEAL

## (undated) DEVICE — INSUFFLATION NEEDLE TO ESTABLISH PNEUMOPERITONEUM.: Brand: INSUFFLATION NEEDLE

## (undated) DEVICE — ARM DRAPE